# Patient Record
Sex: FEMALE | Race: WHITE | NOT HISPANIC OR LATINO | Employment: UNEMPLOYED | URBAN - METROPOLITAN AREA
[De-identification: names, ages, dates, MRNs, and addresses within clinical notes are randomized per-mention and may not be internally consistent; named-entity substitution may affect disease eponyms.]

---

## 2017-01-16 ENCOUNTER — OFFICE VISIT (OUTPATIENT)
Dept: FAMILY MEDICINE | Facility: CLINIC | Age: 62
End: 2017-01-16
Payer: COMMERCIAL

## 2017-01-16 VITALS
BODY MASS INDEX: 26.64 KG/M2 | SYSTOLIC BLOOD PRESSURE: 120 MMHG | HEART RATE: 96 BPM | TEMPERATURE: 97 F | OXYGEN SATURATION: 96 % | WEIGHT: 150.38 LBS | HEIGHT: 63 IN | DIASTOLIC BLOOD PRESSURE: 72 MMHG | RESPIRATION RATE: 18 BRPM

## 2017-01-16 DIAGNOSIS — N39.0 ACUTE UTI (URINARY TRACT INFECTION): Primary | ICD-10-CM

## 2017-01-16 DIAGNOSIS — R39.9 UTI SYMPTOMS: ICD-10-CM

## 2017-01-16 LAB
BILIRUB SERPL-MCNC: NEGATIVE MG/DL
BLOOD URINE, POC: 250
COLOR, POC UA: ABNORMAL
GLUCOSE UR QL STRIP: NORMAL
KETONES UR QL STRIP: NEGATIVE
LEUKOCYTE ESTERASE URINE, POC: 1
NITRITE, POC UA: ABNORMAL
PH, POC UA: 5
PROTEIN, POC: ABNORMAL
SPECIFIC GRAVITY, POC UA: 1.01
UROBILINOGEN, POC UA: NORMAL

## 2017-01-16 PROCEDURE — 99999 PR PBB SHADOW E&M-EST. PATIENT-LVL III: CPT | Mod: PBBFAC,,, | Performed by: REGISTERED NURSE

## 2017-01-16 PROCEDURE — 99213 OFFICE O/P EST LOW 20 MIN: CPT | Mod: 25,S$GLB,, | Performed by: REGISTERED NURSE

## 2017-01-16 PROCEDURE — 81002 URINALYSIS NONAUTO W/O SCOPE: CPT | Mod: S$GLB,,, | Performed by: REGISTERED NURSE

## 2017-01-16 PROCEDURE — 1159F MED LIST DOCD IN RCRD: CPT | Mod: S$GLB,,, | Performed by: REGISTERED NURSE

## 2017-01-16 RX ORDER — PHENAZOPYRIDINE HYDROCHLORIDE 200 MG/1
200 TABLET, FILM COATED ORAL
Qty: 6 TABLET | Refills: 0 | Status: SHIPPED | OUTPATIENT
Start: 2017-01-16 | End: 2017-01-18

## 2017-01-16 RX ORDER — NITROFURANTOIN 25; 75 MG/1; MG/1
100 CAPSULE ORAL 2 TIMES DAILY
Qty: 20 CAPSULE | Refills: 0 | Status: SHIPPED | OUTPATIENT
Start: 2017-01-16 | End: 2017-01-27

## 2017-01-16 NOTE — PROGRESS NOTES
"Subjective:       Patient ID: Yolis Bourgeois is a 61 y.o. female.    Chief Complaint: Cystitis    HPI     Mrs. Bourgeois is here today with UTI symptoms x 3 days.  Reports pain, burning, urgency and frequency.  Drinking water and cranberry juice, taking Azo.    Review of Systems   Constitutional: Negative for chills and fever.   Genitourinary: Positive for dysuria, frequency, pelvic pain and urgency. Negative for flank pain and hematuria.       Objective:         Vitals:    01/16/17 1431   BP: 120/72   Pulse: 96   Resp: 18   Temp: 97 °F (36.1 °C)   TempSrc: Tympanic   SpO2: 96%   Weight: 68.2 kg (150 lb 5.7 oz)   Height: 5' 2.5" (1.588 m)   PainSc:   8   PainLoc: Abdomen       Physical Exam   Constitutional: She is oriented to person, place, and time. She appears well-developed and well-nourished.   Abdominal: There is tenderness in the suprapubic area. There is no CVA tenderness.   Neurological: She is alert and oriented to person, place, and time.         Component      Latest Ref Rng & Units 1/16/2017   Color, UA       paula   Specific [place], UA       1.010   pH, UA       5   WBC, UA       1 (A)   Nitrite, UA       postive (A)   Protein, UA       trace (A)   Glucose, UA       normal   Ketones, UA       negative   Urobilinogen, UA       normal   Bilirubin, UA       negative   RBC, UA       250 (A)       Assessment:       1. Acute UTI (urinary tract infection)    2. UTI symptoms        Plan:       Yolis was seen today for cystitis.    Diagnoses and all orders for this visit:    Acute UTI (urinary tract infection)  -     nitrofurantoin, macrocrystal-monohydrate, (MACROBID) 100 MG capsule; Take 1 capsule (100 mg total) by mouth 2 (two) times daily.  -     phenazopyridine (PYRIDIUM) 200 MG tablet; Take 1 tablet (200 mg total) by mouth 3 (three) times daily with meals.    UTI symptoms  -     POCT urine dipstick without microscope  -     nitrofurantoin, macrocrystal-monohydrate, (MACROBID) 100 MG capsule; Take 1 capsule " (100 mg total) by mouth 2 (two) times daily.  -     phenazopyridine (PYRIDIUM) 200 MG tablet; Take 1 tablet (200 mg total) by mouth 3 (three) times daily with meals.      Infection triggers and prevention discussed.  RTC prn.

## 2017-01-26 ENCOUNTER — OFFICE VISIT (OUTPATIENT)
Dept: OPHTHALMOLOGY | Facility: CLINIC | Age: 62
End: 2017-01-26
Payer: COMMERCIAL

## 2017-01-26 DIAGNOSIS — H04.129 DRY EYE: Primary | ICD-10-CM

## 2017-01-26 DIAGNOSIS — H18.30 CORNEAL EPITHELIAL DEFECT: ICD-10-CM

## 2017-01-26 PROCEDURE — 92012 INTRM OPH EXAM EST PATIENT: CPT | Mod: S$GLB,,, | Performed by: OPHTHALMOLOGY

## 2017-01-27 ENCOUNTER — OFFICE VISIT (OUTPATIENT)
Dept: OPHTHALMOLOGY | Facility: CLINIC | Age: 62
End: 2017-01-27
Payer: COMMERCIAL

## 2017-01-27 DIAGNOSIS — H20.9 IRITIS: ICD-10-CM

## 2017-01-27 DIAGNOSIS — S05.02XD CORNEAL ABRASION, LEFT, SUBSEQUENT ENCOUNTER: Primary | ICD-10-CM

## 2017-01-27 PROBLEM — H18.30 CORNEAL EPITHELIAL DEFECT: Status: ACTIVE | Noted: 2017-01-27

## 2017-01-27 PROCEDURE — 99999 PR PBB SHADOW E&M-EST. PATIENT-LVL II: CPT | Mod: PBBFAC,,, | Performed by: OPHTHALMOLOGY

## 2017-01-27 PROCEDURE — 92012 INTRM OPH EXAM EST PATIENT: CPT | Mod: S$GLB,,, | Performed by: OPHTHALMOLOGY

## 2017-01-27 RX ORDER — METOPROLOL SUCCINATE 25 MG/1
12.5 TABLET, EXTENDED RELEASE ORAL DAILY
COMMUNITY
End: 2018-01-31

## 2017-01-27 RX ORDER — PREDNISOLONE SODIUM PHOSPHATE 10 MG/ML
1 SOLUTION/ DROPS OPHTHALMIC 3 TIMES DAILY
COMMUNITY
End: 2017-03-27 | Stop reason: ALTCHOICE

## 2017-01-27 RX ORDER — ERYTHROMYCIN 5 MG/G
OINTMENT OPHTHALMIC NIGHTLY
COMMUNITY
End: 2017-03-27 | Stop reason: ALTCHOICE

## 2017-01-27 NOTE — MR AVS SNAPSHOT
[facility]  [address] [place] [address]  Phone: [phone]  Fax: [phone]                  Yolis Bourgeois   2017 10:45 AM   Office Visit    Description:  Female : 1955   Provider:  Cr Fraga MD   Department:  [facility]           Reason for Visit     Follow-up           Diagnoses this Visit        Comments    Corneal abrasion, left, subsequent encounter    -  Primary     Iritis                To Do List           Future Appointments        Provider Department Dept Phone    3/17/2017 8:30 AM SHERRY Simons MD [facility] - Ophthalmology [phone]    2017 8:45 AM ONLH US1 [facility] [phone]      Goals (5 Years of Data)     None      [facility]     [facility] Nurse Care [facility] -  Assistance  Registered nurses in the [facility] provide clinical advisement, health education, appointment booking, and other advisory services.  Call for this free service at [phone].             Medications           Message regarding Medications     Verify the changes and/or additions to your medication regime listed below are the same as discussed with your clinician today.  If any of these changes or additions are incorrect, please notify your healthcare provider.        STOP taking these medications     nitrofurantoin, macrocrystal-monohydrate, (MACROBID) 100 MG capsule Take 1 capsule (100 mg total) by mouth 2 (two) times daily.           Verify that the below list of medications is an accurate representation of the medications you are currently taking.  If none reported, the list may be blank. If incorrect, please contact your healthcare provider. Carry this list with you in case of emergency.           Current Medications     cycloSPORINE (RESTASIS) 0.05 % ophthalmic emulsion 1 drop.    erythromycin (ROMYCIN) ophthalmic ointment Place into the left eye every evening.    lubiprostone (AMITIZA) 24 MCG Cap  Take 1 capsule (24 mcg total) by mouth 2 (two) times daily with meals.    metoprolol succinate (TOPROL-XL) 25 MG 24 hr tablet Take 25 mg by mouth once daily.    multivitamin with minerals (MULTI-VITAMIN W/MINERALS) Cap Take 2 capsules by mouth Daily.    prednisoLONE sodium phosphate (INFLAMASE FORTE) 1 % Drop Place 1 drop into the left eye 3 (three) times daily.    RESTASIS 0.05 % ophthalmic emulsion INSTILL ONE DROP IN EACH EYE TWICE DAILY    thyroid, pork, 81.25 mg Tab Take 81.25 mg by mouth once daily.    acrivastine-pseudoephedrine 8-60 mg Cap Take 1 tablet by mouth every 4 to 6 hours as needed.    aspirin (ECOTRIN) 81 MG EC tablet Take 81 mg by mouth once daily.    meloxicam (MOBIC) 15 MG tablet TAKE 1 TABLET BY MOUTH EVERY DAY *TAKE WITH FOOD* **MAY CAUSE DROWSINESS**    phentermine (ADIPEX-P) 37.5 mg tablet Take 37.5 mg by mouth every morning.           Clinical Reference Information           Allergies as of 1/27/2017     Climara [Estradiol]      Immunizations Administered on Date of Encounter - 1/27/2017     None

## 2017-01-27 NOTE — PROGRESS NOTES
SUBJECTIVE:   Yolis Bourgeois is a 61 y.o. female   Uncorrected distance visual acuity was 20/30 -2 in the right eye and 20/100 in the left eye.   Chief Complaint   Patient presents with    Follow-up     k abrasion os, iritis. 5 on pain scale today. symptoms are improving.         HPI:  HPI     Follow-up    Additional comments: k abrasion os, iritis. 5 on pain scale today.   symptoms are improving.            Comments   1 day k abrasion and iritis follow up os.     No dm  Anatomic variant mac degen  Cobblestone od  Dry eyes  Lasik  K abrasion os  Iritis os    Pred s phosphate tid os  Emycin lashonda q3hrs os       Last edited by Daisy Larsen MA on 1/27/2017 10:50 AM. (History)        Assessment /Plan :  1. Corneal abrasion, left, subsequent encounter continue Emycin ointment q 3 hours, add Refresh pm . Continues to improve but significant corneal haze and edema and iritis, this may be a defect from epithelial sloughing from severe dry eyes.   2. Iritis continue PRED as directed         RTC on Monday

## 2017-01-27 NOTE — PROGRESS NOTES
SUBJECTIVE:   Yolis Bourgeois is a 61 y.o. female   Visual acuity was not recorded.   No chief complaint on file.       HPI:      Assessment /Plan :  1. Dry eye    2. Corneal epithelial defect pt thinks she may have hit OS with restasis tip vs dry/sloughing. Emycin oint q3hrs and PSP 1 % tid     rtc in AM

## 2017-01-30 ENCOUNTER — OFFICE VISIT (OUTPATIENT)
Dept: OPHTHALMOLOGY | Facility: CLINIC | Age: 62
End: 2017-01-30
Payer: COMMERCIAL

## 2017-01-30 DIAGNOSIS — H04.129 DRY EYE: ICD-10-CM

## 2017-01-30 DIAGNOSIS — H18.30 CORNEAL EPITHELIAL DEFECT: Primary | ICD-10-CM

## 2017-01-30 PROCEDURE — 99999 PR PBB SHADOW E&M-EST. PATIENT-LVL II: CPT | Mod: PBBFAC,,, | Performed by: OPHTHALMOLOGY

## 2017-01-30 PROCEDURE — 92012 INTRM OPH EXAM EST PATIENT: CPT | Mod: S$GLB,,, | Performed by: OPHTHALMOLOGY

## 2017-01-30 NOTE — MR AVS SNAPSHOT
[facility]  [address] [facility] [place] [address]  Phone: [phone]  Fax: [phone]                  Yolis Bourgeois   2017 11:15 AM   Office Visit    Description:  Female : 1955   Provider:  Cr Fraga MD   Department:  [facility]           Reason for Visit     Corneal abrasion           Diagnoses this Visit        Comments    Corneal epithelial defect    -  Primary     Dry eye                To Do List           Future Appointments        Provider Department Dept Phone    3/17/2017 8:30 AM SHERRY Simons MD [facility] - Ophthalmology [phone]    2017 8:45 AM ONLH US1 [facility] [phone]      Goals (5 Years of Data)     None      [facility]     [facility] Nurse Care Line -  Assistance  Registered nurses in the [facility] provide clinical advisement, health education, appointment booking, and other advisory services.  Call for this free service at [phone].             Medications           Message regarding Medications     Verify the changes and/or additions to your medication regime listed below are the same as discussed with your clinician today.  If any of these changes or additions are incorrect, please notify your healthcare provider.             Verify that the below list of medications is an accurate representation of the medications you are currently taking.  If none reported, the list may be blank. If incorrect, please contact your healthcare provider. Carry this list with you in case of emergency.           Current Medications     acrivastine-pseudoephedrine 8-60 mg Cap Take 1 tablet by mouth every 4 to 6 hours as needed.    aspirin (ECOTRIN) 81 MG EC tablet Take 81 mg by mouth once daily.    erythromycin (ROMYCIN) ophthalmic ointment Place into the left eye every evening.    lubiprostone (AMITIZA) 24 MCG Cap Take 1 capsule (24 mcg total) by mouth 2 (two) times daily with meals.     meloxicam (MOBIC) 15 MG tablet TAKE 1 TABLET BY MOUTH EVERY DAY *TAKE WITH FOOD* **MAY CAUSE DROWSINESS**    metoprolol succinate (TOPROL-XL) 25 MG 24 hr tablet Take 25 mg by mouth once daily.    multivitamin with minerals (MULTI-VITAMIN W/MINERALS) Cap Take 2 capsules by mouth Daily.    phentermine (ADIPEX-P) 37.5 mg tablet Take 37.5 mg by mouth every morning.    prednisoLONE sodium phosphate (INFLAMASE FORTE) 1 % Drop Place 1 drop into the left eye 3 (three) times daily.    RESTASIS 0.05 % ophthalmic emulsion INSTILL ONE DROP IN EACH EYE TWICE DAILY    thyroid, pork, 81.25 mg Tab Take 81.25 mg by mouth once daily.           Clinical Reference Information           Allergies as of 1/30/2017     Climara [Estradiol]      Immunizations Administered on Date of Encounter - 1/30/2017     None

## 2017-01-30 NOTE — PROGRESS NOTES
SUBJECTIVE:   Yolis Bourgeois is a 61 y.o. female   Uncorrected distance visual acuity was 20/30 in the right eye and 20/50 in the left eye.   Chief Complaint   Patient presents with    Corneal abrasion     OS        HPI:  HPI     Corneal abrasion    Additional comments: OS           Comments   Pt states that her eye feels better. It isn't gone completely, but it is   feeling much better. [facility] a tad blurry. Never missed a dosage. Pt states   that she occasionally feels like something is being dragged across her   left eye. Pain is a 0.    No dm  Anatomic variant mac degen  Cobblestone od  Dry eyes  Lasik  K abrasion os  Iritis os  Punctal plugs ou    Pred s phosphate tid os  Emycin lashonda q3hrs os       Last edited by Buck Milan, Patient Care Assistant on 1/30/2017 11:23   AM. (History)        Assessment /Plan :  1. Corneal epithelial defect  Cont Pred S Phosphate TID OS Erythromycin PRN    2. Dry eye Cont restasis BID OU       Return to clinic Monday

## 2017-02-06 ENCOUNTER — OFFICE VISIT (OUTPATIENT)
Dept: OPHTHALMOLOGY | Facility: CLINIC | Age: 62
End: 2017-02-06
Payer: COMMERCIAL

## 2017-02-06 DIAGNOSIS — H18.30 CORNEAL EPITHELIAL DEFECT: Primary | ICD-10-CM

## 2017-02-06 PROCEDURE — 92012 INTRM OPH EXAM EST PATIENT: CPT | Mod: S$GLB,,, | Performed by: OPHTHALMOLOGY

## 2017-02-06 PROCEDURE — 99999 PR PBB SHADOW E&M-EST. PATIENT-LVL II: CPT | Mod: PBBFAC,,, | Performed by: OPHTHALMOLOGY

## 2017-02-06 NOTE — PROGRESS NOTES
SUBJECTIVE:   Yolis Bourgeois is a 61 y.o. female   Uncorrected distance visual acuity was 20/25 +1 in the right eye and 20/40 -1 in the left eye.   Chief Complaint   Patient presents with    Corneal Abrasion     1 week recheck K abrasion, Pred Phosphate TID OS, E-mycin prn OS, Naphcon A QAM        HPI:  HPI     Corneal Abrasion    Additional comments: 1 week recheck K abrasion, Pred Phosphate TID OS,   E-mycin prn OS, Naphcon A QAM           Comments   Pt states she's having some dryness today but otherwise the eyes have been   ok. She forgot to mention at her last visit she's also using Naphcon A in   the mornings both eyes and also Systane wipes both eyes. No ocular pain or   irritation.    No dm  Anatomic variant mac degen  Cobblestone od  Dry eyes  Lasik  K abrasion os  Iritis os  Punctal plugs ou    Pred s phosphate tid os  Emycin lashonda PRN  Restasis BID OU       Last edited by Brad Mendez on 2/6/2017 10:19 AM. (History)        Assessment /Plan :  1. Corneal epithelial defect   Healing well and lasik interface haze resolving- taper off PSP x 10 days and rtc prn

## 2017-02-06 NOTE — MR AVS SNAPSHOT
[facility]  [address] [facility] [place] [address]  Phone: [phone]  Fax: [phone]                  Yolis Bourgeois   2017 9:45 AM   Office Visit    Description:  Female : 1955   Provider:  Cr Fraga MD   Department:  [facility]           Reason for Visit     Corneal Abrasion           Diagnoses this Visit        Comments    Corneal epithelial defect    -  Primary            To Do List           Future Appointments        Provider Department Dept Phone    3/17/2017 8:30 AM [facility] MD [facility] [phone]    2017 8:45 AM ONLH US1 [facility] [phone]      Goals (5 Years of Data)     None      [facility]     [facility] Nurse Care Line -  Assistance  Registered nurses in the [facility] provide clinical advisement, health education, appointment booking, and other advisory services.  Call for this free service at [phone].             Medications           Message regarding Medications     Verify the changes and/or additions to your medication regime listed below are the same as discussed with your clinician today.  If any of these changes or additions are incorrect, please notify your healthcare provider.             Verify that the below list of medications is an accurate representation of the medications you are currently taking.  If none reported, the list may be blank. If incorrect, please contact your healthcare provider. Carry this list with you in case of emergency.           Current Medications     acrivastine-pseudoephedrine 8-60 mg Cap Take 1 tablet by mouth every 4 to 6 hours as needed.    aspirin (ECOTRIN) 81 MG EC tablet Take 81 mg by mouth once daily.    erythromycin (ROMYCIN) ophthalmic ointment Place into the left eye every evening.    lubiprostone (AMITIZA) 24 MCG Cap Take 1 capsule (24 mcg total) by mouth 2 (two) times daily with meals.    meloxicam (MOBIC) 15  MG tablet TAKE 1 TABLET BY MOUTH EVERY DAY *TAKE WITH FOOD* **MAY CAUSE DROWSINESS**    metoprolol succinate (TOPROL-XL) 25 MG 24 hr tablet Take 25 mg by mouth once daily.    multivitamin with minerals (MULTI-VITAMIN W/MINERALS) Cap Take 2 capsules by mouth Daily.    naphazoline 0.1% (NAPHCON) 0.1 % ophthalmic solution 1 drop 4 (four) times daily as needed.    phentermine (ADIPEX-P) 37.5 mg tablet Take 37.5 mg by mouth every morning.    prednisoLONE sodium phosphate (INFLAMASE FORTE) 1 % Drop Place 1 drop into the left eye 3 (three) times daily.    RESTASIS 0.05 % ophthalmic emulsion INSTILL ONE DROP IN EACH EYE TWICE DAILY    thyroid, pork, 81.25 mg Tab Take 81.25 mg by mouth once daily.           Clinical Reference Information           Allergies as of 2/6/2017     Climara [Estradiol]      Immunizations Administered on Date of Encounter - 2/6/2017     None      Language Assistance Services     ATTENTION: Language assistance services are available, free of charge. Please call [phone].      ATENCIÓN: Si mendozala ashvin, tiene a pedroza disposición servicios gratuitos de asistencia lingüística. Llame al [phone].     CHÚ Ý: N?u b?n nói Ti?ng Vi?t, có các d?ch v? h? tr? ngôn ng? mi?n phí dành cho b?n. G?i s? [phone].         [facility] complies with applicable Federal civil rights laws and does not discriminate on the basis of race, color, national origin, age, disability, or sex.

## 2017-03-17 ENCOUNTER — OFFICE VISIT (OUTPATIENT)
Dept: OPHTHALMOLOGY | Facility: CLINIC | Age: 62
End: 2017-03-17
Payer: COMMERCIAL

## 2017-03-17 DIAGNOSIS — H35.433 PAVING STONE RETINAL DEGENERATION OF BOTH EYES: Primary | ICD-10-CM

## 2017-03-17 PROCEDURE — 99999 PR PBB SHADOW E&M-EST. PATIENT-LVL II: CPT | Mod: PBBFAC,,, | Performed by: OPHTHALMOLOGY

## 2017-03-17 PROCEDURE — 92014 COMPRE OPH EXAM EST PT 1/>: CPT | Mod: S$GLB,,, | Performed by: OPHTHALMOLOGY

## 2017-03-17 RX ORDER — DIETHYLPROPION HYDROCHLORIDE 75 MG/1
1 TABLET, EXTENDED RELEASE ORAL EVERY MORNING
Refills: 0 | COMMUNITY
Start: 2017-02-20 | End: 2017-05-11

## 2017-03-17 NOTE — PROGRESS NOTES
===============================  Yolis Bourgeois,   61 y.o. female   Last visit [facility]: :3/15/2016   Last [place] [facility] dept. 2017  [facility]:  Uncorrected distance visual acuity was 20/30 in the right eye and 20/30 in the left eye.  Tonometry     Tonometry (Applanation, 8:28 AM)      Right Left   Pressure 18 18                  Not recorded         Not recorded        Chief Complaint   Patient presents with    PATTERN DYSTROPHY COBBLESTONE     YEARLY EXAM     Ophthalmic Medications     Ophthalmic - Anti-inflammatory, Glucocorticoids Start End    prednisoLONE sodium phosphate (INFLAMASE FORTE) 1 % Drop      Sig: Place 1 drop into the left eye 3 (three) times daily.    Class: Historical Med    Route: Left Eye    Ophthalmic - Decongestants Start End    naphazoline 0.1% (NAPHCON) 0.1 % ophthalmic solution      Si drop 4 (four) times daily as needed.    Class: Historical Med         HPI     PATTERN DYSTROPHY COBBLESTONE    Additional comments: YEARLY EXAM           Comments   Anatomic variant mac degen   Iritis os  Punctal plugs ou      Cobblestone od  Dry eyes  Lasik      Restasis BID OU  Naphcon A QAM OU  Systane Wipes       Last edited by SHERRY Simons MD on 3/17/2017  8:33 AM. (History)      Read Studies:   Vitals    ________________  3/17/2017  1. 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  Dry eyes  decompensatre d amonth ago w dry eyes  restasis  Mild spk os >od   c restiss    frq At s  Onitm,mnet hs  rtc 1 year  Inferior cobblestone  trila off napcon for 1 mo   yaritza ats     ===========================

## 2017-03-17 NOTE — MR AVS SNAPSHOT
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      [facility]     [facility] Nurse Care Line -  Assistance  Registered nurses in the [facility] provide clinical advisement, health education, appointment booking, and other advisory services.  Call for this free service at [phone].             Medications           Message regarding Medications     Verify the changes and/or additions to your medication regime listed below are the same as discussed with your clinician today.  If any of these changes or additions are incorrect, please notify your healthcare provider.             Verify that the below list of medications is an accurate representation of the medications you are currently taking.  If none reported, the list may be blank. If incorrect, please contact your healthcare provider. Carry this list with you in case of emergency.           Current Medications     acrivastine-pseudoephedrine 8-60 mg Cap Take 1 tablet by mouth every 4 to 6 hours as needed.    aspirin (ECOTRIN) 81 MG EC tablet Take 81 mg by mouth once daily.    diethylpropion 75 mg TbSR Take 1 tablet by mouth every morning.    erythromycin (ROMYCIN) ophthalmic ointment Place into the left eye every evening.    lubiprostone (AMITIZA) 24 MCG  Cap Take 1 capsule (24 mcg total) by mouth 2 (two) times daily with meals.    meloxicam (MOBIC) 15 MG tablet TAKE 1 TABLET BY MOUTH EVERY DAY *TAKE WITH FOOD* **MAY CAUSE DROWSINESS**    metoprolol succinate (TOPROL-XL) 25 MG 24 hr tablet Take 25 mg by mouth once daily.    multivitamin with minerals (MULTI-VITAMIN W/MINERALS) Cap Take 2 capsules by mouth Daily.    naphazoline 0.1% (NAPHCON) 0.1 % ophthalmic solution 1 drop 4 (four) times daily as needed.    phentermine (ADIPEX-P) 37.5 mg tablet Take 37.5 mg by mouth every morning.    prednisoLONE sodium phosphate (INFLAMASE FORTE) 1 % Drop Place 1 drop into the left eye 3 (three) times daily.    RESTASIS 0.05 % ophthalmic emulsion INSTILL ONE DROP IN EACH EYE TWICE DAILY    thyroid, pork, 81.25 mg Tab Take 81.25 mg by mouth once daily.           Clinical Reference Information           Allergies as of 3/17/2017     Climara [Estradiol]      Immunizations Administered on Date of Encounter - 3/17/2017     None      Language Assistance Services     ATTENTION: Language assistance services are available, free of charge. Please call [phone].      ATENCIÓN: Si mendozala ashvin, tiene a pedroza disposición servicios gratuitos de asistencia lingüística. Llame al [phone].     ZANDER Ý: N?u b?n nói Ti?ng Vi?t, có các d?ch v? h? tr? ngôn ng? mi?n phí dành cho b?n. G?i s? [phone].         [facility] complies with applicable Federal civil rights laws and does not discriminate on the basis of race, color, national origin, age, disability, or sex.

## 2017-03-27 ENCOUNTER — OFFICE VISIT (OUTPATIENT)
Dept: FAMILY MEDICINE | Facility: CLINIC | Age: 62
End: 2017-03-27
Payer: COMMERCIAL

## 2017-03-27 ENCOUNTER — HOSPITAL ENCOUNTER (OUTPATIENT)
Dept: RADIOLOGY | Facility: HOSPITAL | Age: 62
Discharge: HOME | End: 2017-03-27
Attending: REGISTERED NURSE
Payer: COMMERCIAL

## 2017-03-27 VITALS
DIASTOLIC BLOOD PRESSURE: 82 MMHG | OXYGEN SATURATION: 97 % | HEART RATE: 81 BPM | BODY MASS INDEX: 24.75 KG/M2 | TEMPERATURE: 96 F | WEIGHT: 148.56 LBS | SYSTOLIC BLOOD PRESSURE: 132 MMHG | HEIGHT: 65 IN | RESPIRATION RATE: 18 BRPM

## 2017-03-27 DIAGNOSIS — M79.18 LEFT BUTTOCK PAIN: Primary | ICD-10-CM

## 2017-03-27 DIAGNOSIS — M79.18 LEFT BUTTOCK PAIN: ICD-10-CM

## 2017-03-27 PROCEDURE — 72170 X-RAY EXAM OF PELVIS: CPT | Mod: TC,PO

## 2017-03-27 PROCEDURE — 1160F RVW MEDS BY RX/DR IN RCRD: CPT | Mod: S$GLB,,, | Performed by: REGISTERED NURSE

## 2017-03-27 PROCEDURE — 99213 OFFICE O/P EST LOW 20 MIN: CPT | Mod: S$GLB,,, | Performed by: REGISTERED NURSE

## 2017-03-27 PROCEDURE — 72170 X-RAY EXAM OF PELVIS: CPT | Mod: 26,,, | Performed by: RADIOLOGY

## 2017-03-27 PROCEDURE — 99999 PR PBB SHADOW E&M-EST. PATIENT-LVL IV: CPT | Mod: PBBFAC,,, | Performed by: REGISTERED NURSE

## 2017-03-27 RX ORDER — MELOXICAM 15 MG/1
TABLET ORAL
Qty: 30 TABLET | Refills: 2 | Status: SHIPPED | OUTPATIENT
Start: 2017-03-27 | End: 2018-04-09 | Stop reason: SDUPTHER

## 2017-03-27 RX ORDER — METOPROLOL SUCCINATE 25 MG/1
25 TABLET, EXTENDED RELEASE ORAL DAILY
Status: CANCELLED | OUTPATIENT
Start: 2017-03-27

## 2017-03-27 NOTE — TELEPHONE ENCOUNTER
Spoke with patient states she would like to get refill on meloxicam, because she was taking it for her shoulder and it worked and she don't have to take all the time

## 2017-03-27 NOTE — MR AVS SNAPSHOT
[facility]  [address]  [place] [address]  Phone: [phone]                  Yolis Bourgeois   3/27/2017 9:00 AM   Office Visit    Description:  Female : 1955   Provider:  James Tejada NP   Department:  [facility]           Reason for Visit     Hip Pain           Diagnoses this Visit        Comments    Left buttock pain    -  Primary            To Do List           Future Appointments        Provider Department Dept Phone    3/27/2017 10:00 AM [facility] XR1 [facility] [phone]    2017 8:45 AM ONL US1 [facility] [phone]      Goals (5 Years of Data)     None      [facility]     [facility] -  Assistance  Registered nurses in the [facility] provide clinical advisement, health education, appointment booking, and other advisory services.  Call for this free service at [phone].             Medications           Message regarding Medications     Verify the changes and/or additions to your medication regime listed below are the same as discussed with your clinician today.  If any of these changes or additions are incorrect, please notify your healthcare provider.        STOP taking these medications     erythromycin (ROMYCIN) ophthalmic ointment Place into the left eye every evening.    naphazoline 0.1% (NAPHCON) 0.1 % ophthalmic solution 1 drop 4 (four) times daily as needed.    prednisoLONE sodium phosphate (INFLAMASE FORTE) 1 % Drop Place 1 drop into the left eye 3 (three) times daily.           Verify that the below list of medications is an accurate representation of the medications you are currently taking.  If none reported, the list may be blank. If incorrect, please contact your healthcare provider. Carry this list with you in case of emergency.           Current Medications     aspirin (ECOTRIN) 81 MG EC tablet Take 81 mg by mouth once  "daily.    diethylpropion 75 mg TbSR Take 1 tablet by mouth every morning.    lubiprostone (AMITIZA) 24 MCG Cap Take 1 capsule (24 mcg total) by mouth 2 (two) times daily with meals.    metoprolol succinate (TOPROL-XL) 25 MG 24 hr tablet Take 25 mg by mouth once daily.    multivitamin with minerals (MULTI-VITAMIN W/MINERALS) Cap Take 2 capsules by mouth Daily.    RESTASIS 0.05 % ophthalmic emulsion INSTILL ONE DROP IN EACH EYE TWICE DAILY    thyroid, pork, 81.25 mg Tab Take 81.25 mg by mouth once daily.    acrivastine-pseudoephedrine 8-60 mg Cap Take 1 tablet by mouth every 4 to 6 hours as needed.    meloxicam (MOBIC) 15 MG tablet TAKE 1 TABLET BY MOUTH EVERY DAY *TAKE WITH FOOD* **MAY CAUSE DROWSINESS**    phentermine (ADIPEX-P) 37.5 mg tablet Take 37.5 mg by mouth every morning.           Clinical Reference Information           Your Vitals Were     BP Pulse Temp Resp Height Weight    132/82 81 96 °F (35.6 °C) (Tympanic) 18 5' 4.5" (1.638 m) 67.4 kg (148 lb 9.4 oz)    SpO2 BMI             97% 25.11 kg/m2         Blood Pressure          Most Recent Value    BP  132/82      Allergies as of 3/27/2017     Climara [Estradiol]      Immunizations Administered on Date of Encounter - 3/27/2017     None      Orders Placed During Today's Visit     Future Labs/Procedures Expected by Expires    X-Ray Pelvis Routine AP  3/27/2017 3/27/2018      Language Assistance Services     ATTENTION: Language assistance services are available, free of charge. Please call [phone].      ATENCIÓN: Si delfino lock, tiene a pedroza disposición servicios gratuitos de asistencia lingüística. Llame al [phone].     ZANDER Ý: N?u b?n nói Ti?ng Vi?t, có các d?ch v? h? tr? ngôn ng? mi?n phí dành cho b?n. G?i s? [phone].         [facility] complies with applicable Federal civil rights laws and does not discriminate on the basis of race, color, national origin, age, disability, or sex.        "

## 2017-03-27 NOTE — PROGRESS NOTES
"Subjective:       Patient ID: Yolis Bourgeois is a 61 y.o. female.    Chief Complaint: Hip Pain    HPI     Mrs. Bourgeois is here today with c/o LT hip pain.  Denies injury or trauma.  Pain ~ 2 to 3 weeks, waxing and waning.  Increases w/ moving in a certain way, catches her at times.  She is able to lay on LT side, does have full ROM in hip joint.  Denies hip stiffness, stretching helps.  Denies numbness or weakness.  Able to cross legs w/out problems.  Taking Aleve or ibuprofen for pain.    Review of Systems   Constitutional: Negative.    Respiratory: Negative.    Cardiovascular: Negative.    Musculoskeletal: Positive for arthralgias. Negative for gait problem and joint swelling.   Neurological: Negative.        Objective:       Vitals:    03/27/17 0858   BP: 132/82   Pulse: 81   Resp: 18   Temp: 96 °F (35.6 °C)   TempSrc: Tympanic   SpO2: 97%   Weight: 67.4 kg (148 lb 9.4 oz)   Height: 5' 4.5" (1.638 m)   PainSc: 0-No pain       Physical Exam   Constitutional: She is oriented to person, place, and time. She appears well-developed and well-nourished. No distress.   Musculoskeletal:        Lumbar back: She exhibits tenderness. She exhibits no swelling, no edema, no deformity and normal pulse.        Back:    Neurological: She is alert and oriented to person, place, and time. She displays no atrophy. No sensory deficit. She exhibits normal muscle tone. Coordination and gait normal.   Skin: She is not diaphoretic.       Assessment:       1. Left buttock pain        Plan:       Yolis was seen today for hip pain.    Diagnoses and all orders for this visit:    Left buttock pain  -     X-Ray Pelvis Routine AP; Future      Ice/heat, rest, exercises.  To PT if needed.  RTC prn.  "

## 2017-03-27 NOTE — TELEPHONE ENCOUNTER
Xrays show mild degenerative findings/arthritis present at the pelvis and bilateral SI joints.  Also noted to have some scoliosis of the lower lumbar spine.  Ice/heat, rest, Advil/Tylenol.  To PT if pain worsens.

## 2017-03-27 NOTE — TELEPHONE ENCOUNTER
Patient aware of result and express understanding. Patient would like to get a refill on moloxicam. Patient states it helped

## 2017-04-26 ENCOUNTER — HOSPITAL ENCOUNTER (OUTPATIENT)
Dept: RADIOLOGY | Facility: HOSPITAL | Age: 62
Discharge: HOME | End: 2017-04-26
Attending: NURSE PRACTITIONER
Payer: COMMERCIAL

## 2017-04-26 DIAGNOSIS — N28.1 RENAL CYST: ICD-10-CM

## 2017-04-26 PROCEDURE — 76770 US EXAM ABDO BACK WALL COMP: CPT | Mod: TC

## 2017-04-26 PROCEDURE — 76770 US EXAM ABDO BACK WALL COMP: CPT | Mod: 26,,, | Performed by: RADIOLOGY

## 2017-05-11 ENCOUNTER — OFFICE VISIT (OUTPATIENT)
Dept: UROLOGY | Facility: CLINIC | Age: 62
End: 2017-05-11
Payer: COMMERCIAL

## 2017-05-11 VITALS
BODY MASS INDEX: 24.62 KG/M2 | SYSTOLIC BLOOD PRESSURE: 122 MMHG | WEIGHT: 147.94 LBS | DIASTOLIC BLOOD PRESSURE: 74 MMHG

## 2017-05-11 DIAGNOSIS — N28.1 RENAL CYST: Primary | ICD-10-CM

## 2017-05-11 PROCEDURE — 99214 OFFICE O/P EST MOD 30 MIN: CPT | Mod: S$GLB,,, | Performed by: UROLOGY

## 2017-05-11 PROCEDURE — 1160F RVW MEDS BY RX/DR IN RCRD: CPT | Mod: S$GLB,,, | Performed by: UROLOGY

## 2017-05-11 PROCEDURE — 99999 PR PBB SHADOW E&M-EST. PATIENT-LVL II: CPT | Mod: PBBFAC,,, | Performed by: UROLOGY

## 2017-05-11 NOTE — MR AVS SNAPSHOT
[facility]  [address] [address]  Phone: [phone]  Fax: [phone]                  Yolis Bourgeois   2017 2:40 PM   Office Visit    Description:  Female : 1955   Provider:  James Villagomez IV, MD   Department:  [facility]           Diagnoses this Visit        Comments    Renal cyst    -  Primary            To Do List           Future Appointments        Provider Department Dept Phone    2018 8:00 AM ONLH US1 [facility] [phone]    2018 1:40 PM James Villagomez IV, MD [facility] [phone]      Goals (5 Years of Data)     None      Follow-Up and Disposition     Return in about 1 year (around 2018).    Follow-up and Disposition History      [facility]     [facility] Nurse Care [facility] -  Assistance  Unless otherwise directed by your provider, please contact [facility], our nurse care line that is available for  assistance.     Registered nurses in the [facility] provide: appointment scheduling, clinical advisement, health education, and other advisory services.  Call: [phone] (toll free)               Medications           Message regarding Medications     Verify the changes and/or additions to your medication regime listed below are the same as discussed with your clinician today.  If any of these changes or additions are incorrect, please notify your healthcare provider.        STOP taking these medications     diethylpropion 75 mg TbSR Take 1 tablet by mouth every morning.    phentermine (ADIPEX-P) 37.5 mg tablet Take 37.5 mg by mouth every morning.           Verify that the below list of medications is an accurate representation of the medications you are currently taking.  If none reported, the list may be blank. If incorrect, please contact your healthcare provider. Carry this list with you in case of emergency.           Current Medications      acrivastine-pseudoephedrine 8-60 mg Cap Take 1 tablet by mouth every 4 to 6 hours as needed.    aspirin (ECOTRIN) 81 MG EC tablet Take 81 mg by mouth once daily.    lubiprostone (AMITIZA) 24 MCG Cap Take 1 capsule (24 mcg total) by mouth 2 (two) times daily with meals.    meloxicam (MOBIC) 15 MG tablet TAKE 1 TABLET BY MOUTH EVERY DAY *TAKE WITH FOOD* **MAY CAUSE DROWSINESS**    metoprolol succinate (TOPROL-XL) 25 MG 24 hr tablet Take 12.5 mg by mouth once daily.     multivitamin with minerals (MULTI-VITAMIN W/MINERALS) Cap Take 2 capsules by mouth Daily.    RESTASIS 0.05 % ophthalmic emulsion INSTILL ONE DROP IN EACH EYE TWICE DAILY    thyroid, pork, 81.25 mg Tab Take 81.25 mg by mouth once daily.           Clinical Reference Information           Your Vitals Were     BP Weight BMI          122/74 67.1 kg (147 lb 14.9 oz) 24.62 kg/m2        Blood Pressure          Most Recent Value    BP  122/74      Allergies as of 5/11/2017     Climara [Estradiol]      Immunizations Administered on Date of Encounter - 5/11/2017     None      Orders Placed During Today's Visit     Future Labs/Procedures Expected by Expires    US Retroperitoneal Complete (Kidney and  5/11/2017 5/11/2018      Language Assistance Services     ATTENTION: Language assistance services are available, free of charge. Please call [phone].      ATENCIÓN: Si habla ashvin, tiene a pedroza disposición servicios gratuitos de asistencia lingüística. Llame al [phone].     CHÚ Ý: N?u b?n nói Ti?ng Vi?t, có các d?ch v? h? tr? ngôn ng? mi?n phí dành cho b?n. G?i s? [phone].         [facility] complies with applicable Federal civil rights laws and does not discriminate on the basis of race, color, national origin, age, disability, or sex.

## 2017-05-11 NOTE — PROGRESS NOTES
Chief Complaint: Renal cysts    HPI:   5/11/17: 60 yo woman followed for 4 years for renal cysts.  Had a pyelonephritis on the way.  Has a 2.5 cm right Adal II cyst that looks stable over many years.    Allergies:  Climara [estradiol]    Medications: has a current medication list which includes the following prescription(s): acrivastine-pseudoephedrine, aspirin, lubiprostone, meloxicam, metoprolol succinate, multivitamin with minerals, restasis, and thyroid (pork).    Review of Systems:  General: No fever, chills, fatigability, or weight loss.  Skin: No rashes, itching, or changes in color or texture of skin.  Chest: Denies NIEVES, cyanosis, wheezing, cough, and sputum production.  Abdomen: Appetite fine. No weight loss. Denies diarrhea, abdominal pain, hematemesis, or blood in stool.  Musculoskeletal: No joint stiffness or swelling. Denies back pain.  : As above.  All other review of systems negative.    PMH:   has a past medical history of Chronic constipation; Depression; Dry eyes; Endometriosis of broad ligament; Hypothyroidism; Macular degeneration; Migraine headache; Osteoarthritis of hand; and Seasonal allergic rhinitis.    PSH:   has a past surgical history that includes Lipoma resection (2010); Total abdominal hysterectomy w/ bilateral salpingoophorectomy (March 2008); Cervical conization w/ laser; Dilation and curettage of uterus; and LASIK (2007).    FamHx: family history includes Dementia in her mother; Diabetes in her father; Glaucoma in her maternal uncle; Heart disease in her father and mother; Heart failure in her father; Hypertension in her father and mother. There is no history of Amblyopia, Blindness, Cancer, Cataracts, Macular degeneration, Retinal detachment, Strabismus, Stroke, or Thyroid disease.    SocHx:  reports that she quit smoking about 34 years ago. She has never used smokeless tobacco. She reports that she drinks alcohol. She reports that she does not use illicit drugs.     Physical  Exam:  Vitals:   Vitals:    05/11/17 1458   BP: 122/74     General: A&Ox3. No apparent distress. No deformities.  Neck: No masses. Normal thyroid.  Lungs: normal inspiration. No use of accessory muscles.  Heart: normal pulse. No arrhythmias.  Abdomen: Soft. NT. ND  Skin: The skin is warm and dry. No jaundice.  Ext: No c/c/e.  : deferred    Labs/Studies:     Impression/Plan:   1. Unlikely this renal cyst will ever be a problem.  But after discussion agreed to the reassurance of annual US.

## 2017-05-19 ENCOUNTER — HOSPITAL ENCOUNTER (OUTPATIENT)
Dept: RADIOLOGY | Facility: HOSPITAL | Age: 62
Discharge: HOME | End: 2017-05-19
Attending: FAMILY MEDICINE
Payer: COMMERCIAL

## 2017-05-19 ENCOUNTER — OFFICE VISIT (OUTPATIENT)
Dept: FAMILY MEDICINE | Facility: CLINIC | Age: 62
End: 2017-05-19
Payer: COMMERCIAL

## 2017-05-19 VITALS
HEIGHT: 64 IN | WEIGHT: 147.69 LBS | SYSTOLIC BLOOD PRESSURE: 130 MMHG | DIASTOLIC BLOOD PRESSURE: 80 MMHG | BODY MASS INDEX: 25.21 KG/M2 | OXYGEN SATURATION: 98 % | HEART RATE: 70 BPM | RESPIRATION RATE: 16 BRPM | TEMPERATURE: 97 F

## 2017-05-19 DIAGNOSIS — M25.511 CHRONIC RIGHT SHOULDER PAIN: Primary | ICD-10-CM

## 2017-05-19 DIAGNOSIS — G89.29 CHRONIC RIGHT SHOULDER PAIN: Primary | ICD-10-CM

## 2017-05-19 DIAGNOSIS — M25.511 CHRONIC RIGHT SHOULDER PAIN: ICD-10-CM

## 2017-05-19 DIAGNOSIS — G89.29 CHRONIC RIGHT SHOULDER PAIN: ICD-10-CM

## 2017-05-19 DIAGNOSIS — M75.51 BURSITIS OF RIGHT SHOULDER: ICD-10-CM

## 2017-05-19 PROCEDURE — 99999 PR PBB SHADOW E&M-EST. PATIENT-LVL IV: CPT | Mod: PBBFAC,,, | Performed by: FAMILY MEDICINE

## 2017-05-19 PROCEDURE — 73030 X-RAY EXAM OF SHOULDER: CPT | Mod: 26,RT,, | Performed by: RADIOLOGY

## 2017-05-19 PROCEDURE — 1160F RVW MEDS BY RX/DR IN RCRD: CPT | Mod: S$GLB,,, | Performed by: FAMILY MEDICINE

## 2017-05-19 PROCEDURE — 73030 X-RAY EXAM OF SHOULDER: CPT | Mod: TC,PO,RT

## 2017-05-19 PROCEDURE — 99214 OFFICE O/P EST MOD 30 MIN: CPT | Mod: S$GLB,,, | Performed by: FAMILY MEDICINE

## 2017-05-19 NOTE — PROGRESS NOTES
Subjective:       Patient ID: Yolis Bourgeois is a 61 y.o. female.    Chief Complaint: Shoulder Pain      HPI   Ms. Bourgeois presents to clinic today for complaints of right shoulder pain.   She states she has bursitis and has had this before.   She states it hurts to lay on her right side.   She states the pain is worst with driving.   She has been taking mobic and it is not helping.   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  The pain is worst with certain movements.       Review of Systems   Constitutional: Negative for fever.   Respiratory: Negative for shortness of breath.    Cardiovascular: Negative for chest pain.   Gastrointestinal: Negative for abdominal pain, diarrhea, nausea and vomiting.   Genitourinary: Negative for dysuria and hematuria.   Skin: Negative for rash.       Medication List with Changes/Refills   Current Medications    ACRIVASTINE-PSEUDOEPHEDRINE 8-60 MG CAP    Take 1 tablet by mouth every 4 to 6 hours as needed.    ASPIRIN (ECOTRIN) 81 MG EC TABLET    Take 81 mg by mouth once daily.    LUBIPROSTONE (AMITIZA) 24 MCG CAP    Take 1 capsule (24 mcg total) by mouth 2 (two) times daily with meals.    MELOXICAM (MOBIC) 15 MG TABLET    TAKE 1 TABLET BY MOUTH EVERY DAY *TAKE WITH FOOD* **MAY CAUSE DROWSINESS**    METOPROLOL SUCCINATE (TOPROL-XL) 25 MG 24 HR TABLET    Take 12.5 mg by mouth once daily.     MULTIVITAMIN WITH MINERALS (MULTI-VITAMIN W/MINERALS) CAP    Take 2 capsules by mouth Daily.    RESTASIS 0.05 % OPHTHALMIC EMULSION    INSTILL ONE DROP IN EACH EYE TWICE DAILY    THYROID, PORK, 81.25 MG TAB    Take 81.25 mg by mouth once daily.       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 She appears well-developed and well-nourished. No distress.   HENT:   Head: Normocephalic and atraumatic.   Eyes: EOM are normal. Right eye exhibits no discharge. Left eye exhibits no discharge.   Cardiovascular: Normal rate and regular rhythm.    Pulmonary/Chest: Effort normal and breath sounds normal. No respiratory distress. She has no wheezes.   Abdominal: There is no tenderness.   Musculoskeletal: She exhibits no edema.   Right shoulder with no erythema , warmth, tenderness noted   Negative scarf test   One area of mild tenderness on posterior shoulder  Empty can negative  Sensation is intact      Neurological: She is alert and oriented to person, place, and time.   Skin: Skin is warm and dry. She is not diaphoretic. No erythema.   Psychiatric: She has a normal mood and affect.   Vitals reviewed.    Vitals:    05/19/17 0841   BP: 130/80   Pulse: 70   Resp: 16   Temp: 96.5 °F (35.8 °C)       Brief Procedure note   Discussed potential complications of procedure including bleeding, infection, and injury to nerve.  Patient understands and accepts risks.  Verbal consent obtained.     Procedure: After sterile prep, 1 cc Kenalog 40 mg/cc +4 cc 1% Xylocaine injected using posterior approach   Patient tolerated procedure well with null blood loss     Assessment/  PLAN     Chronic right shoulder pain  -     X-ray Shoulder 2 or More Views Right; Future; Expected date: 05/19/2017  -     lidocaine HCL 20 mg/ml (2%) injection 4 mL; 4 mLs by Other route one time.  -     triamcinolone acetonide injection 40 mg; Inject 1 mL (40 mg total) into the articular space one time.    Bursitis of right shoulder  -     lidocaine HCL 20 mg/ml (2%) injection 4 mL; 4 mLs by Other route one time.  -     triamcinolone acetonide injection 40 mg; Inject 1 mL (40 mg total) into the articular space one time.  - ice shoulder  , decrease sewing if possible   - return precautions advised     plan as above   rtc prn         Shiv Vo MD  [facility]

## 2017-05-19 NOTE — MR AVS SNAPSHOT
[facility]  [address]  [place] [address]  Phone: [phone]                  Yolis Bourgeois   2017 8:40 AM   Office Visit    Description:  Female : 1955   Provider:  Shiv Vo MD   Department:  [facility]           Reason for Visit     Shoulder Pain           Diagnoses this Visit        Comments    Chronic right shoulder pain    -  Primary            To Do List           Future Appointments        Provider Department Dept Phone    2017  9:15 AM [facility] XR1 [facility] [phone]    2018 8:00 AM ONL US1 [facility] [phone]    2018 1:40 PM James Villagomez IV, MD [facility] [phone]      Goals (5 Years of Data)     None      [facility] On Call     [facility] Nurse Care Line -  Assistance  Unless otherwise directed by your provider, please contact [facility], our nurse care line that is available for  assistance.     Registered nurses in the [facility] provide: appointment scheduling, clinical advisement, health education, and other advisory services.  Call: [phone] (toll free)               Medications           Message regarding Medications     Verify the changes and/or additions to your medication regime listed below are the same as discussed with your clinician today.  If any of these changes or additions are incorrect, please notify your healthcare provider.             Verify that the below list of medications is an accurate representation of the medications you are currently taking.  If none reported, the list may be blank. If incorrect, please contact your healthcare provider. Carry this list with you in case of emergency.           Current Medications     acrivastine-pseudoephedrine 8-60 mg Cap Take 1 tablet by mouth every 4 to 6 hours as needed.    aspirin (ECOTRIN) 81 MG EC tablet Take 81 mg by mouth once daily.     "lubiprostone (AMITIZA) 24 MCG Cap Take 1 capsule (24 mcg total) by mouth 2 (two) times daily with meals.    meloxicam (MOBIC) 15 MG tablet TAKE 1 TABLET BY MOUTH EVERY DAY *TAKE WITH FOOD* **MAY CAUSE DROWSINESS**    metoprolol succinate (TOPROL-XL) 25 MG 24 hr tablet Take 12.5 mg by mouth once daily.     multivitamin with minerals (MULTI-VITAMIN W/MINERALS) Cap Take 2 capsules by mouth Daily.    RESTASIS 0.05 % ophthalmic emulsion INSTILL ONE DROP IN EACH EYE TWICE DAILY    thyroid, pork, 81.25 mg Tab Take 81.25 mg by mouth once daily.           Clinical Reference Information           Your Vitals Were     BP Pulse Temp Resp Height Weight    130/80 70 96.5 °F (35.8 °C) 16 5' 4" (1.626 m) 67 kg (147 lb 11.3 oz)    SpO2 BMI             98% 25.35 kg/m2         Blood Pressure          Most Recent Value    BP  130/80      Allergies as of 5/19/2017     Climara [Estradiol]      Immunizations Administered on Date of Encounter - 5/19/2017     None      Orders Placed During Today's Visit     Future Labs/Procedures Expected by Expires    X-ray Shoulder 2 or More Views Right  5/19/2017 5/19/2018      Language Assistance Services     ATTENTION: Language assistance services are available, free of charge. Please call [phone].      ATENCIÓN: Si mendozala ashvin, tiene a pedroza disposición servicios gratuitos de asistencia lingüística. Llame al [phone].     CHÚ Ý: N?u b?n nói Ti?ng Vi?t, có các d?ch v? h? tr? ngôn ng? mi?n phí dành cho b?n. G?i s? [phone].         [facility] complies with applicable Federal civil rights laws and does not discriminate on the basis of race, color, national origin, age, disability, or sex.        "

## 2017-05-22 RX ORDER — TRIAMCINOLONE ACETONIDE 40 MG/ML
40 INJECTION, SUSPENSION INTRA-ARTICULAR; INTRAMUSCULAR
Status: DISCONTINUED | OUTPATIENT
Start: 2017-05-22 | End: 2017-06-08

## 2017-05-22 RX ORDER — LIDOCAINE HYDROCHLORIDE 20 MG/ML
4 INJECTION, SOLUTION INFILTRATION; PERINEURAL
Status: DISCONTINUED | OUTPATIENT
Start: 2017-05-22 | End: 2017-06-08

## 2017-06-01 ENCOUNTER — PATIENT MESSAGE (OUTPATIENT)
Dept: FAMILY MEDICINE | Facility: CLINIC | Age: 62
End: 2017-06-01

## 2017-06-02 ENCOUNTER — OFFICE VISIT (OUTPATIENT)
Dept: FAMILY MEDICINE | Facility: CLINIC | Age: 62
End: 2017-06-02
Payer: COMMERCIAL

## 2017-06-02 VITALS
HEART RATE: 71 BPM | HEIGHT: 64 IN | OXYGEN SATURATION: 97 % | SYSTOLIC BLOOD PRESSURE: 114 MMHG | DIASTOLIC BLOOD PRESSURE: 70 MMHG | TEMPERATURE: 97 F | RESPIRATION RATE: 18 BRPM | WEIGHT: 145.94 LBS | BODY MASS INDEX: 24.92 KG/M2

## 2017-06-02 DIAGNOSIS — G43.909 MIGRAINE WITHOUT STATUS MIGRAINOSUS, NOT INTRACTABLE, UNSPECIFIED MIGRAINE TYPE: Primary | ICD-10-CM

## 2017-06-02 DIAGNOSIS — F43.9 SITUATIONAL STRESS: ICD-10-CM

## 2017-06-02 DIAGNOSIS — M54.2 NECK PAIN: ICD-10-CM

## 2017-06-02 PROCEDURE — 99214 OFFICE O/P EST MOD 30 MIN: CPT | Mod: S$GLB,,, | Performed by: FAMILY MEDICINE

## 2017-06-02 PROCEDURE — 99999 PR PBB SHADOW E&M-EST. PATIENT-LVL III: CPT | Mod: PBBFAC,,, | Performed by: FAMILY MEDICINE

## 2017-06-02 RX ORDER — BUTALBITAL, ACETAMINOPHEN AND CAFFEINE 50; 325; 40 MG/1; MG/1; MG/1
1-2 TABLET ORAL EVERY 6 HOURS PRN
Qty: 30 TABLET | Refills: 0 | Status: SHIPPED | OUTPATIENT
Start: 2017-06-02 | End: 2017-07-02

## 2017-06-02 NOTE — PROGRESS NOTES
CHIEF COMPLAINT: This is a 61-year-old female complaining of migraine headache.    SUBJECTIVE: The patient complains of recurrent migraine headaches for the last 2 weeks.  Patient has had migraine headaches in the past but headaches have all but resolved in the last several years.  Headaches are located in various locations on her head including temple area, occiput and face.  She describes the pain as dull and throbbing.  And rates the pain at 10 out of 10 at its worst.  Patient has occasional nausea associated with the pain and complains of hyperacusis.  She has slight photophobia.  She has tried taking Tylenol, ibuprofen and Excedrin migraine without relief.  Years ago, she took Midrin or Fioricet for headaches.  She was never on abortive or prophylactic medication.  Patient admits to recent increased situational stress: her mother has dementia, her father has severe heart disease at age 87, her  is dealing with unrelenting neuropathy, her daughter is having problems with her ex- and her son's wife just admitted to an affair.  Patient denies vertigo, numbness, tingling, weakness in limb, dysarthria, dysphagia or abnormality of gait.    ROS:  GENERAL: Patient denies fever, chills, night sweats.  Patient denies weight loss. Patient denies anorexia, fatigue, weakness or swollen glands.  SKIN: Patient denies rash or hair loss.  HEENT: Patient denies sore throat, ear pain, hearing loss, nasal congestion, or runny nose. Patient denies visual disturbance, eye irritation or discharge.  LUNGS: Patient denies cough, wheeze or hemoptysis.  CARDIOVASCULAR: Patient denies chest pain, shortness of breath, palpitations, syncope or lower extremity edema.  GI: Patient denies abdominal pain, nausea, vomiting, diarrhea, constipation, blood in stool or melena.  GENITOURINARY: Patient denies pelvic pain, vaginal discharge, itch or odor. Patient denies irregular vaginal bleeding.  Patient denies dysuria, frequency,  hematuria, nocturia, urgency or incontinence.  BREASTS: Patient denies breast pain, mass or nipple discharge.  MUSCULOSKELETAL: Patient denies joint swelling, redness or warmth.  NEUROLOGIC: As above.  PSYCHIATRIC: Patient depression, insomnia or memory loss.  Positive for anxiety.     OBJECTIVE:   GENERAL: Well-developed well-nourished overweight white female alert and oriented x3, in no acute distress.  Memory, judgment and cognition without deficit.   SKIN: Clear without rash.  Normal color and tone.  HEENT: Eyes: Clear conjunctivae. No scleral icterus.  Pupils equal reactive to light and accommodation..  Extraocular movements intact.  No nystagmus.  Fundi not visualized Ears: Clear canals. Clear TMs.  Nose: Without congestion.  Pharynx: Without injection or exudates.  NECK: Supple, normal range of motion.  Slight tenderness to palpation at base of occiput extending into posterior neck.  No masses, lymphadenopathy or enlarged thyroid.  No JVD.  Carotids 2+ and equal.  No bruits.  LUNGS: Clear to auscultation.  Normal respiratory effort.  CARDIOVASCULAR:  Regular rhythm, normal S1, S2 without murmur, gallop or rub.  ABDOMEN:  Soft, nontender without mass or organomegaly.  No rebound or guarding.  EXTREMITIES: Without cyanosis, clubbing or edema.  Distal pulses 2+ and equal.  Normal range of motion in all extremities.  No joint effusion, erythema or warmth.  NEUROLOGIC:   Cranial nerves II through XII without deficit.  Motor strength equal bilaterally.  Sensation normal to touch.  Deep tendon reflexes 2+ and equal.  Gait without abnormality.  No tremor.  Negative cerebellar signs.  Negative Romberg.    ASSESSMENT:  1. Migraine without status migrainosus, not intractable, unspecified migraine type    2. Neck pain    3. Situational stress      PLAN:   1.  Fioricet 1-2 tablets every 4-6 hours as needed for headache.  2.  Consider abortive and/or prophylactic medication if no resolution.  3.  Follow-up with no  improvement or worsening symptoms.  4.  Consider imaging studies.

## 2017-06-20 ENCOUNTER — PATIENT MESSAGE (OUTPATIENT)
Dept: FAMILY MEDICINE | Facility: CLINIC | Age: 62
End: 2017-06-20

## 2017-06-21 RX ORDER — ALPRAZOLAM 0.25 MG/1
.25-.5 TABLET ORAL 3 TIMES DAILY PRN
Qty: 30 TABLET | Refills: 0 | Status: SHIPPED | OUTPATIENT
Start: 2017-06-21 | End: 2017-12-19

## 2017-07-20 DIAGNOSIS — K59.00 CONSTIPATION, UNSPECIFIED CONSTIPATION TYPE: ICD-10-CM

## 2017-07-20 RX ORDER — LUBIPROSTONE 24 UG/1
24 CAPSULE, GELATIN COATED ORAL 2 TIMES DAILY WITH MEALS
Qty: 60 CAPSULE | Refills: 1 | Status: SHIPPED | OUTPATIENT
Start: 2017-07-20 | End: 2017-09-16 | Stop reason: SDUPTHER

## 2017-09-16 DIAGNOSIS — K59.00 CONSTIPATION, UNSPECIFIED CONSTIPATION TYPE: ICD-10-CM

## 2017-09-16 RX ORDER — LUBIPROSTONE 24 UG/1
24 CAPSULE, GELATIN COATED ORAL 2 TIMES DAILY WITH MEALS
Qty: 60 CAPSULE | Refills: 0 | Status: SHIPPED | OUTPATIENT
Start: 2017-09-16 | End: 2017-11-28 | Stop reason: SDUPTHER

## 2017-11-02 ENCOUNTER — TELEPHONE (OUTPATIENT)
Dept: FAMILY MEDICINE | Facility: CLINIC | Age: 62
End: 2017-11-02

## 2017-11-02 DIAGNOSIS — Z12.31 ENCOUNTER FOR SCREENING MAMMOGRAM FOR MALIGNANT NEOPLASM OF BREAST: Primary | ICD-10-CM

## 2017-11-08 DIAGNOSIS — K59.00 CONSTIPATION, UNSPECIFIED CONSTIPATION TYPE: ICD-10-CM

## 2017-11-09 RX ORDER — LUBIPROSTONE 24 UG/1
24 CAPSULE, GELATIN COATED ORAL 2 TIMES DAILY WITH MEALS
Qty: 60 CAPSULE | OUTPATIENT
Start: 2017-11-09

## 2017-11-17 ENCOUNTER — HOSPITAL ENCOUNTER (OUTPATIENT)
Dept: RADIOLOGY | Facility: HOSPITAL | Age: 62
Discharge: HOME | End: 2017-11-17
Attending: FAMILY MEDICINE
Payer: COMMERCIAL

## 2017-11-17 VITALS — HEIGHT: 63 IN | WEIGHT: 145 LBS | BODY MASS INDEX: 25.69 KG/M2

## 2017-11-17 DIAGNOSIS — Z12.31 ENCOUNTER FOR SCREENING MAMMOGRAM FOR MALIGNANT NEOPLASM OF BREAST: ICD-10-CM

## 2017-11-17 PROCEDURE — 77063 BREAST TOMOSYNTHESIS BI: CPT | Mod: 26,,, | Performed by: RADIOLOGY

## 2017-11-17 PROCEDURE — 77067 SCR MAMMO BI INCL CAD: CPT | Mod: TC

## 2017-11-17 PROCEDURE — 77067 SCR MAMMO BI INCL CAD: CPT | Mod: 26,,, | Performed by: RADIOLOGY

## 2017-11-21 DIAGNOSIS — K59.00 CONSTIPATION, UNSPECIFIED CONSTIPATION TYPE: ICD-10-CM

## 2017-11-22 DIAGNOSIS — K59.00 CONSTIPATION, UNSPECIFIED CONSTIPATION TYPE: ICD-10-CM

## 2017-11-22 RX ORDER — LUBIPROSTONE 24 UG/1
24 CAPSULE, GELATIN COATED ORAL 2 TIMES DAILY WITH MEALS
Qty: 60 CAPSULE | OUTPATIENT
Start: 2017-11-22

## 2017-11-28 ENCOUNTER — TELEPHONE (OUTPATIENT)
Dept: GASTROENTEROLOGY | Facility: HOSPITAL | Age: 62
End: 2017-11-28

## 2017-11-28 ENCOUNTER — TELEPHONE (OUTPATIENT)
Dept: GASTROENTEROLOGY | Facility: CLINIC | Age: 62
End: 2017-11-28

## 2017-11-28 DIAGNOSIS — K59.00 CONSTIPATION, UNSPECIFIED CONSTIPATION TYPE: ICD-10-CM

## 2017-11-28 RX ORDER — LUBIPROSTONE 24 UG/1
24 CAPSULE ORAL 2 TIMES DAILY WITH MEALS
Qty: 60 CAPSULE | Refills: 0 | Status: SHIPPED | OUTPATIENT
Start: 2017-11-28 | End: 2017-12-19

## 2017-11-28 NOTE — TELEPHONE ENCOUNTER
----- Message from Jessica Hernandez LPN sent at 11/28/2017  9:57 AM CST -----  Regarding: Med refill  Ms Bourgeois would like for you to refill her amitiza until she can make it to her apt with Nita on 12/19/17. She is completely out.

## 2017-12-04 ENCOUNTER — PATIENT MESSAGE (OUTPATIENT)
Dept: FAMILY MEDICINE | Facility: CLINIC | Age: 62
End: 2017-12-04

## 2017-12-04 RX ORDER — BUTALBITAL, ACETAMINOPHEN AND CAFFEINE 50; 325; 40 MG/1; MG/1; MG/1
1 TABLET ORAL
Qty: 60 TABLET | Refills: 0 | Status: SHIPPED | OUTPATIENT
Start: 2017-12-04 | End: 2018-01-03

## 2017-12-19 ENCOUNTER — OFFICE VISIT (OUTPATIENT)
Dept: GASTROENTEROLOGY | Facility: CLINIC | Age: 62
End: 2017-12-19
Payer: COMMERCIAL

## 2017-12-19 VITALS
HEART RATE: 72 BPM | DIASTOLIC BLOOD PRESSURE: 72 MMHG | WEIGHT: 155.63 LBS | BODY MASS INDEX: 28.64 KG/M2 | HEIGHT: 62 IN | SYSTOLIC BLOOD PRESSURE: 122 MMHG

## 2017-12-19 DIAGNOSIS — E03.9 HYPOTHYROIDISM, UNSPECIFIED TYPE: ICD-10-CM

## 2017-12-19 DIAGNOSIS — K59.04 CHRONIC IDIOPATHIC CONSTIPATION: Primary | ICD-10-CM

## 2017-12-19 PROCEDURE — 99214 OFFICE O/P EST MOD 30 MIN: CPT | Mod: S$GLB,,, | Performed by: NURSE PRACTITIONER

## 2017-12-19 PROCEDURE — 99999 PR PBB SHADOW E&M-EST. PATIENT-LVL III: CPT | Mod: PBBFAC,,, | Performed by: NURSE PRACTITIONER

## 2017-12-19 RX ORDER — THYROID 90 MG/1
1 TABLET ORAL
COMMUNITY

## 2017-12-19 NOTE — PROGRESS NOTES
Clinic Follow Up:  [facility] Follow Up Note    Reason for Follow Up:  The primary encounter diagnosis was Chronic idiopathic constipation. A diagnosis of Hypothyroidism, unspecified type was also pertinent to this visit.    PCP: Dana Johnson       HPI:  This is a 62 y.o. female here for follow up of constipation. She is a previous patient of Dr. Yeung but is She has been well controlled on high dose Amitiza up until the last 6 months. She is having bowel movements regularly but reports the feeling of incomplete evaluation. She also has hypothyroidism and recently switched thyroid medications. She says she is due for repeat thyroid levels in the next few weeks. No abdominal pain, hematochezia, melena, nausea, vomiting, or weight loss.     Review of Systems   Constitutional: Negative for activity change and appetite change.   HENT: Negative for sore throat and trouble swallowing.    Eyes: Negative for pain and discharge.   Respiratory: Negative for cough and chest tightness.    Cardiovascular: Negative for chest pain and palpitations.   Genitourinary: Negative for dysuria, frequency and hematuria.   Skin: Negative for color change and rash.   Neurological: Negative for speech difficulty, weakness and headaches.   Psychiatric/Behavioral: Negative for confusion and sleep disturbance.       Medical History:  Past Medical History:   Diagnosis Date    Chronic constipation     Depression     Dry eyes     Endometriosis of broad ligament     Hypothyroidism     Macular degeneration     Migraine headache     Osteoarthritis of hand     Seasonal allergic rhinitis        Surgical History:   Past Surgical History:   Procedure Laterality Date    CERVICAL CONIZATION   W/ LASER      DILATION AND CURETTAGE OF UTERUS      HYSTERECTOMY  2008    complete    LASIK  2007    LIPOMA RESECTION  2010    right thigh    TOTAL ABDOMINAL HYSTERECTOMY W/ BILATERAL SALPINGOOPHORECTOMY  March 2008        Family History:   Family History   Problem Relation Age of Onset    Hypertension Mother     Heart disease Mother     Dementia Mother     Diabetes Father     Hypertension Father     Heart disease Father     Heart failure Father     Glaucoma Maternal Uncle     Amblyopia Neg Hx     Blindness Neg Hx     Cancer Neg Hx     Cataracts Neg Hx     Macular degeneration Neg Hx     Retinal detachment Neg Hx     Strabismus Neg Hx     Stroke Neg Hx     Thyroid disease Neg Hx        Social History:   Social History   Substance Use Topics    Smoking status: Former Smoker     Quit date: 1/7/1983    Smokeless tobacco: Never Used      Comment: quit 33 years ago     Alcohol use Yes      Comment: socially (<1/week)       Allergies:   Review of patient's allergies indicates:   Allergen Reactions    Climara [estradiol]      Glue on climara patch       Home Medications:  Current Outpatient Prescriptions on File Prior to Visit   Medication Sig Dispense Refill    aspirin (ECOTRIN) 81 MG EC tablet Take 81 mg by mouth once daily.      butalbital-acetaminophen-caffeine -40 mg (FIORICET, ESGIC) -40 mg per tablet Take 1 tablet by mouth every 4 to 6 hours as needed for Headaches. 60 tablet 0    meloxicam (MOBIC) 15 MG tablet TAKE 1 TABLET BY MOUTH EVERY DAY *TAKE WITH FOOD* **MAY CAUSE DROWSINESS** 30 tablet 2    metoprolol succinate (TOPROL-XL) 25 MG 24 hr tablet Take 12.5 mg by mouth once daily.       multivitamin with minerals (MULTI-VITAMIN W/MINERALS) Cap Take 2 capsules by mouth Daily.      RESTASIS 0.05 % ophthalmic emulsion INSTILL ONE DROP IN EACH EYE TWICE DAILY 60 vial 11    [DISCONTINUED] lubiprostone (AMITIZA) 24 MCG Cap Take 1 capsule (24 mcg total) by mouth 2 (two) times daily with meals. 60 capsule 0    [DISCONTINUED] acrivastine-pseudoephedrine 8-60 mg Cap Take 1 tablet by mouth every 4 to 6 hours as needed. 30 each 0    [DISCONTINUED] alprazolam (XANAX) 0.25 MG tablet Take 1-2  "tablets (0.25-0.5 mg total) by mouth 3 (three) times daily as needed for Anxiety. 30 tablet 0    [DISCONTINUED] thyroid, pork, 81.25 mg Tab Take 81.25 mg by mouth once daily.       No current facility-administered medications on file prior to visit.        Physical Exam:  Vital Signs:  /72   Pulse 72   Ht 5' 2" (1.575 m)   Wt 70.6 kg (155 lb 10.3 oz)   BMI 28.47 kg/m²   Body mass index is 28.47 kg/m².  Physical Exam   Constitutional: She is oriented to person, place, and time and well-developed, well-nourished, and in no distress. No distress.   HENT:   Head: Normocephalic.   Eyes: Conjunctivae are normal. Pupils are equal, round, and reactive to light.   Cardiovascular: Normal rate, regular rhythm and normal heart sounds.    Pulmonary/Chest: Effort normal and breath sounds normal. No respiratory distress.   Abdominal: Soft. Bowel sounds are normal. She exhibits no distension. There is no tenderness.   Neurological: She is alert and oriented to person, place, and time. No cranial nerve deficit.   Skin: Skin is warm and dry. No rash noted.   Psychiatric: Mood and affect normal.       Labs: Pertinent labs reviewed.    CRC Screening: up to date    Assessment:  1. Chronic idiopathic constipation    2. Hypothyroidism, unspecified type        Recommendations:  Chronic idiopathic constipation  - Increase Linzess dosage  -     linaclotide (LINZESS) 290 mcg Cap; Take 1 capsule (290 mcg total) by mouth once daily.  Dispense: 30 capsule; Refill: 5    Hypothyroidism, unspecified type  - If uncontrolled, may be contributing factor.  - Management per PCP    Return to Clinic:  Return in about 1 year (around 12/19/2018), or if symptoms worsen or fail to improve.    Thank you for the opportunity to participate in the care of this patient.  MIGUEL Jeronimo      "

## 2018-01-31 ENCOUNTER — OFFICE VISIT (OUTPATIENT)
Dept: FAMILY MEDICINE | Facility: CLINIC | Age: 63
End: 2018-01-31
Payer: COMMERCIAL

## 2018-01-31 VITALS
HEIGHT: 62 IN | HEART RATE: 73 BPM | BODY MASS INDEX: 29.3 KG/M2 | TEMPERATURE: 98 F | SYSTOLIC BLOOD PRESSURE: 130 MMHG | OXYGEN SATURATION: 97 % | WEIGHT: 159.19 LBS | RESPIRATION RATE: 18 BRPM | DIASTOLIC BLOOD PRESSURE: 80 MMHG

## 2018-01-31 DIAGNOSIS — J32.9 SINUSITIS, UNSPECIFIED CHRONICITY, UNSPECIFIED LOCATION: Primary | ICD-10-CM

## 2018-01-31 PROCEDURE — 3008F BODY MASS INDEX DOCD: CPT | Mod: S$GLB,,, | Performed by: REGISTERED NURSE

## 2018-01-31 PROCEDURE — 96372 THER/PROPH/DIAG INJ SC/IM: CPT | Mod: S$GLB,,, | Performed by: FAMILY MEDICINE

## 2018-01-31 PROCEDURE — 99999 PR PBB SHADOW E&M-EST. PATIENT-LVL IV: CPT | Mod: PBBFAC,,, | Performed by: REGISTERED NURSE

## 2018-01-31 PROCEDURE — 99213 OFFICE O/P EST LOW 20 MIN: CPT | Mod: 25,S$GLB,, | Performed by: REGISTERED NURSE

## 2018-01-31 RX ORDER — BENZONATATE 100 MG/1
100 CAPSULE ORAL 3 TIMES DAILY PRN
Qty: 45 CAPSULE | Refills: 0 | Status: SHIPPED | OUTPATIENT
Start: 2018-01-31 | End: 2019-02-25

## 2018-01-31 RX ORDER — AZITHROMYCIN 250 MG/1
TABLET, FILM COATED ORAL
Qty: 6 TABLET | Refills: 0 | Status: SHIPPED | OUTPATIENT
Start: 2018-01-31 | End: 2018-04-24

## 2018-01-31 RX ORDER — BETAMETHASONE SODIUM PHOSPHATE AND BETAMETHASONE ACETATE 3; 3 MG/ML; MG/ML
12 INJECTION, SUSPENSION INTRA-ARTICULAR; INTRALESIONAL; INTRAMUSCULAR; SOFT TISSUE
Status: COMPLETED | OUTPATIENT
Start: 2018-01-31 | End: 2018-01-31

## 2018-01-31 RX ORDER — METOPROLOL TARTRATE 25 MG/1
12.5 TABLET, FILM COATED ORAL 2 TIMES DAILY
Refills: 9 | COMMUNITY
Start: 2018-01-02

## 2018-01-31 RX ADMIN — BETAMETHASONE SODIUM PHOSPHATE AND BETAMETHASONE ACETATE 12 MG: 3; 3 INJECTION, SUSPENSION INTRA-ARTICULAR; INTRALESIONAL; INTRAMUSCULAR; SOFT TISSUE at 11:01

## 2018-01-31 NOTE — PROGRESS NOTES
"Subjective:       Patient ID: Yolis Bourgeois is a 62 y.o. female.    Chief Complaint: Cough and Chest Congestion      HPI    Mrs. Bourgeois has been ill for the past 2 weeks, waxing and waning.    OTC meds have not helped.    Reports ST, PND, ear pain and cough.    Does c/o HA and sinus pressure.      Review of Systems   Constitutional: Negative for chills and fever.   HENT: Positive for congestion, ear pain, postnasal drip, rhinorrhea, sinus pressure and sore throat. Negative for nosebleeds, trouble swallowing and voice change.    Eyes: Negative.    Respiratory: Positive for cough. Negative for wheezing.    Cardiovascular: Negative.    Neurological: Positive for headaches. Negative for weakness, light-headedness and numbness.   Hematological: Negative for adenopathy.         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 She appears well-developed and well-nourished.   HENT:   Head: Normocephalic and atraumatic.   Right Ear: Tympanic membrane normal.   Left Ear: Tympanic membrane normal.   Nose: Mucosal edema and rhinorrhea present. Right sinus exhibits frontal sinus tenderness. Right sinus exhibits no maxillary sinus tenderness. Left sinus exhibits frontal sinus tenderness. Left sinus exhibits no maxillary sinus tenderness.   Mouth/Throat: " Posterior oropharyngeal erythema present. No oropharyngeal exudate or posterior oropharyngeal edema.   Eyes: Conjunctivae and EOM are normal. Pupils are equal, round, and reactive to light.   Cardiovascular: Normal rate, regular rhythm and normal heart sounds.    Pulmonary/Chest: Effort normal and breath sounds normal.   Lymphadenopathy:     She has no cervical adenopathy.   Neurological: She is alert and oriented to person, place, and time.   Vitals reviewed.        Medication List with Changes/Refills   New Medications    AZITHROMYCIN (ZITHROMAX Z-WENDY) 250 MG TABLET    Take 2 tab today, then 1 tab daily x 4 days.    BENZONATATE (TESSALON) 100 MG CAPSULE    Take 1 capsule (100 mg total) by mouth 3 (three) times daily as needed for Cough.   Current Medications    ASPIRIN (ECOTRIN) 81 MG EC TABLET    Take 81 mg by mouth once daily.    LINACLOTIDE (LINZESS) 290 MCG CAP    Take 1 capsule (290 mcg total) by mouth once daily.    MELOXICAM (MOBIC) 15 MG TABLET    TAKE 1 TABLET BY MOUTH EVERY DAY *TAKE WITH FOOD* **MAY CAUSE DROWSINESS**    METOPROLOL TARTRATE (LOPRESSOR) 25 MG TABLET    Take 12.5 mg by mouth 2 (two) times daily.    MULTIVITAMIN WITH MINERALS (MULTI-VITAMIN W/MINERALS) CAP    Take 2 capsules by mouth Daily.    RESTASIS 0.05 % OPHTHALMIC EMULSION    INSTILL ONE DROP IN EACH EYE TWICE DAILY    THYROID, PORK, (NP THYROID) 90 MG TAB    Take 1 tablet by mouth before breakfast.   Discontinued Medications    METOPROLOL SUCCINATE (TOPROL-XL) 25 MG 24 HR TABLET    Take 12.5 mg by mouth once daily.            Diagnosis       1. Sinusitis, unspecified chronicity, unspecified location          Assessment/ Plan     Sinusitis, unspecified chronicity, unspecified location  -     betamethasone acetate-betamethasone sodium phosphate injection 12 mg; Inject 2 mLs (12 mg total) into the muscle one time.  -     azithromycin (ZITHROMAX Z-WENDY) 250 MG tablet; Take 2 tab today, then 1 tab daily x 4 days.  Dispense: 6 tablet;  Refill: 0  -     benzonatate (TESSALON) 100 MG capsule; Take 1 capsule (100 mg total) by mouth 3 (three) times daily as needed for Cough.  Dispense: 45 capsule; Refill: 0        Symptomatic care, rest, fluids, hydration.  Follow-up in clinic as needed.        FAHAD Blackburn  [facility]

## 2018-02-13 ENCOUNTER — PATIENT MESSAGE (OUTPATIENT)
Dept: GASTROENTEROLOGY | Facility: CLINIC | Age: 63
End: 2018-02-13

## 2018-02-13 DIAGNOSIS — K59.04 CHRONIC IDIOPATHIC CONSTIPATION: Primary | ICD-10-CM

## 2018-02-28 ENCOUNTER — PATIENT MESSAGE (OUTPATIENT)
Dept: OPHTHALMOLOGY | Facility: CLINIC | Age: 63
End: 2018-02-28

## 2018-02-28 ENCOUNTER — PATIENT MESSAGE (OUTPATIENT)
Dept: GASTROENTEROLOGY | Facility: CLINIC | Age: 63
End: 2018-02-28

## 2018-03-19 ENCOUNTER — OFFICE VISIT (OUTPATIENT)
Dept: OPHTHALMOLOGY | Facility: CLINIC | Age: 63
End: 2018-03-19
Payer: COMMERCIAL

## 2018-03-19 DIAGNOSIS — H04.129 DRYNESS, EYE: ICD-10-CM

## 2018-03-19 DIAGNOSIS — H35.54 MACULAR PATTERN DYSTROPHY: ICD-10-CM

## 2018-03-19 DIAGNOSIS — H40.003 GLAUCOMA SUSPECT OF BOTH EYES: ICD-10-CM

## 2018-03-19 DIAGNOSIS — H35.433 PAVING STONE RETINAL DEGENERATION OF BOTH EYES: Primary | ICD-10-CM

## 2018-03-19 PROCEDURE — 92014 COMPRE OPH EXAM EST PT 1/>: CPT | Mod: S$GLB,,, | Performed by: OPHTHALMOLOGY

## 2018-03-19 PROCEDURE — 76514 ECHO EXAM OF EYE THICKNESS: CPT | Mod: S$GLB,,, | Performed by: OPHTHALMOLOGY

## 2018-03-19 PROCEDURE — 92133 CPTRZD OPH DX IMG PST SGM ON: CPT | Mod: S$GLB,,, | Performed by: OPHTHALMOLOGY

## 2018-03-19 PROCEDURE — 99999 PR PBB SHADOW E&M-EST. PATIENT-LVL II: CPT | Mod: PBBFAC,,, | Performed by: OPHTHALMOLOGY

## 2018-03-19 NOTE — PROGRESS NOTES
===============================  03/19/2018   Yolis Bourgeois,   62 y.o. female   Last visit [facility]: :3/17/2017   Last visit eye dept. Visit date not found  [facility]  Uncorrected [place] visual acuity was 20/30 in the right eye and 20/30 in the left eye.  Tonometry     Tonometry (Applanation, 8:23 AM)       Right Left    Pressure 14 14               Not recorded         Not recorded        Chief Complaint   Patient presents with    cobblestone     pt states vision seems good    Dry Eye     using naphcon a 2 x a month        HPI     cobblestone    Additional comments: pt states vision seems good           Dry Eye    Additional comments: using naphcon a 2 x a month           Comments   1. Anatomic variant mac degen  2. Cobblestone od  3. Dry eyes  Punctal plugs ou  4. H/O Lasik  5. H/O Iritis os        Restasis BID OU  Naphcon A QAM OU  Systane Wipes       Last edited by CARLA Buck on 3/19/2018  8:17 AM. 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

## 2018-04-10 RX ORDER — MELOXICAM 15 MG/1
TABLET ORAL
Qty: 30 TABLET | Refills: 0 | Status: SHIPPED | OUTPATIENT
Start: 2018-04-10 | End: 2018-08-13 | Stop reason: SDUPTHER

## 2018-04-23 ENCOUNTER — TELEPHONE (OUTPATIENT)
Dept: FAMILY MEDICINE | Facility: CLINIC | Age: 63
End: 2018-04-23

## 2018-04-23 NOTE — TELEPHONE ENCOUNTER
----- Message from Kalyani Garcia sent at 4/23/2018  3:42 PM CDT -----  Contact: pt   Call pt regarding if she can come in to have tech shot on tomorrow. Pt states that she stuck herself in the hand with a pair of scissor.     ..[phone] (home)

## 2018-04-23 NOTE — TELEPHONE ENCOUNTER
lp 10/30/15  lov 6/2/17  Last tdap 6/2013  Pt states she stuck her self in the hand with some scissors and wants to come in to have a tdap injection.

## 2018-04-24 ENCOUNTER — OFFICE VISIT (OUTPATIENT)
Dept: OPHTHALMOLOGY | Facility: CLINIC | Age: 63
End: 2018-04-24
Payer: COMMERCIAL

## 2018-04-24 DIAGNOSIS — H40.1131 PRIMARY OPEN ANGLE GLAUCOMA (POAG) OF BOTH EYES, MILD STAGE: Primary | ICD-10-CM

## 2018-04-24 PROBLEM — H40.003 GLAUCOMA SUSPECT OF BOTH EYES: Status: RESOLVED | Noted: 2018-03-19 | Resolved: 2018-04-24

## 2018-04-24 PROCEDURE — 99999 PR PBB SHADOW E&M-EST. PATIENT-LVL II: CPT | Mod: PBBFAC,,, | Performed by: OPHTHALMOLOGY

## 2018-04-24 PROCEDURE — 92083 EXTENDED VISUAL FIELD XM: CPT | Mod: S$GLB,,, | Performed by: OPHTHALMOLOGY

## 2018-04-24 PROCEDURE — 99213 OFFICE O/P EST LOW 20 MIN: CPT | Mod: S$GLB,,, | Performed by: OPHTHALMOLOGY

## 2018-04-24 RX ORDER — LATANOPROST 50 UG/ML
1 SOLUTION/ DROPS OPHTHALMIC DAILY
Qty: 2.5 ML | Refills: 11 | Status: SHIPPED | OUTPATIENT
Start: 2018-04-24 | End: 2019-05-29 | Stop reason: SDUPTHER

## 2018-04-24 NOTE — PROGRESS NOTES
===============================  04/24/2018   Yolis Bourgeois,   62 y.o. female   Last visit [facility]: :3/19/2018   Last visit eye dept. 3/19/2018  [facility]  Uncorrected distance visual acuity was 20/30 in the right eye and 20/30 in the left eye.   Not recorded         Not recorded         Not recorded        Chief Complaint   Patient presents with    Glaucoma Suspect     iop ck and VF review     Ophthalmic Medications     Ophthalmic-Intraocular Pressure Reducing Agents, Prostaglandin Analogs Start End    latanoprost (XALATAN) 0.005 % ophthalmic solution 4/24/2018 4/24/2019    Sig: Place 1 drop into both eyes once daily.    Route: Both Eyes         HPI     Glaucoma Suspect    Additional comments: iop ck and VF review           Comments   1. Anatomic variant mac degen  2. Cobblestone od  3. Dry eyes  Punctal plugs ou  4. H/O Lasik  5. H/O Iritis os  HVF 4/24/18        Restasis BID OU  Naphcon A QAM OU  Systane Wipes       Last edited by CARLA Buck on 4/24/2018 11:28 AM. (History)          ________________  4/24/2018  Problem List Items Addressed This Visit        Eye/Vision problems    Primary open angle glaucoma (POAG) of both eyes, mild stage - Primary    Relevant Orders    Sung Visual Field - OU - Extended - Both Eyes        Coag   VF od bjerrum od / os normal  Tilted dsic - thin rim  Begin xalatn- rtc 2 months    .       ===========================

## 2018-05-01 RX ORDER — CYCLOSPORINE 0.5 MG/ML
1 EMULSION OPHTHALMIC 2 TIMES DAILY
Qty: 60 VIAL | Refills: 11 | Status: SHIPPED | OUTPATIENT
Start: 2018-05-01 | End: 2019-05-20

## 2018-05-01 RX ORDER — CYCLOSPORINE 0.5 MG/ML
1 EMULSION OPHTHALMIC 2 TIMES DAILY
Qty: 60 VIAL | Refills: 11 | Status: SHIPPED | OUTPATIENT
Start: 2018-05-01 | End: 2018-05-17

## 2018-05-10 ENCOUNTER — TELEPHONE (OUTPATIENT)
Dept: RADIOLOGY | Facility: HOSPITAL | Age: 63
End: 2018-05-10

## 2018-05-11 ENCOUNTER — HOSPITAL ENCOUNTER (OUTPATIENT)
Dept: RADIOLOGY | Facility: HOSPITAL | Age: 63
Discharge: HOME | End: 2018-05-11
Attending: UROLOGY
Payer: COMMERCIAL

## 2018-05-11 DIAGNOSIS — N28.1 RENAL CYST: ICD-10-CM

## 2018-05-11 PROCEDURE — 76770 US EXAM ABDO BACK WALL COMP: CPT | Mod: TC

## 2018-05-11 PROCEDURE — 76770 US EXAM ABDO BACK WALL COMP: CPT | Mod: 26,,, | Performed by: RADIOLOGY

## 2018-05-17 ENCOUNTER — OFFICE VISIT (OUTPATIENT)
Dept: UROLOGY | Facility: CLINIC | Age: 63
End: 2018-05-17
Payer: COMMERCIAL

## 2018-05-17 VITALS
HEIGHT: 63 IN | WEIGHT: 155.88 LBS | DIASTOLIC BLOOD PRESSURE: 76 MMHG | BODY MASS INDEX: 27.62 KG/M2 | SYSTOLIC BLOOD PRESSURE: 122 MMHG

## 2018-05-17 DIAGNOSIS — N28.1 RENAL CYST: Primary | ICD-10-CM

## 2018-05-17 LAB
BILIRUB SERPL-MCNC: NORMAL MG/DL
BLOOD URINE, POC: NORMAL
COLOR, POC UA: YELLOW
GLUCOSE UR QL STRIP: NORMAL
KETONES UR QL STRIP: NORMAL
LEUKOCYTE ESTERASE URINE, POC: NORMAL
NITRITE, POC UA: NORMAL
PH, POC UA: 6
PROTEIN, POC: NORMAL
SPECIFIC GRAVITY, POC UA: 1.02
UROBILINOGEN, POC UA: NORMAL

## 2018-05-17 PROCEDURE — 99213 OFFICE O/P EST LOW 20 MIN: CPT | Mod: 25,S$GLB,, | Performed by: UROLOGY

## 2018-05-17 PROCEDURE — 99999 PR PBB SHADOW E&M-EST. PATIENT-LVL II: CPT | Mod: PBBFAC,,, | Performed by: UROLOGY

## 2018-05-17 PROCEDURE — 81002 URINALYSIS NONAUTO W/O SCOPE: CPT | Mod: S$GLB,,, | Performed by: UROLOGY

## 2018-05-17 RX ORDER — NALTREXONE HYDROCHLORIDE AND BUPROPION HYDROCHLORIDE 8; 90 MG/1; MG/1
TABLET, EXTENDED RELEASE ORAL
Refills: 2 | COMMUNITY
Start: 2018-05-07 | End: 2019-02-25

## 2018-05-17 NOTE — PROGRESS NOTES
Chief Complaint: Renal cysts    HPI:   5/17/18: No problems in the interval. Bilateral cysts same.  5/11/17: 62 yo woman followed for 4 years for renal cysts.  Had a pyelonephritis on the way.  Has a 2.5 cm right Adal II cyst that looks stable over many years.    Allergies:  Climara [estradiol]    Medications: has a current medication list which includes the following prescription(s): aspirin, benzonatate, cyclosporine, latanoprost, linaclotide, meloxicam, metoprolol tartrate, multivitamin with minerals, thyroid (pork), and contrave.    Review of Systems:  General: No fever, chills, fatigability, or weight loss.  Skin: No rashes, itching, or changes in color or texture of skin.  Chest: Denies NIEVES, cyanosis, wheezing, cough, and sputum production.  Abdomen: Appetite fine. No weight loss. Denies diarrhea, abdominal pain, hematemesis, or blood in stool.  Musculoskeletal: No joint stiffness or swelling. Denies back pain.  : As above.  All other review of systems negative.    PMH:   has a past medical history of Chronic constipation; Depression; Dry eyes; Endometriosis of broad ligament; Hypothyroidism; Macular degeneration; Migraine headache; Osteoarthritis of hand; Primary open angle glaucoma (POAG) of both eyes, mild stage (4/24/2018); and Seasonal allergic rhinitis.    PSH:   has a past surgical history that includes Lipoma resection (2010); Total abdominal hysterectomy w/ bilateral salpingoophorectomy (March 2008); Cervical conization w/ laser; Dilation and curettage of uterus; LASIK (2007); and Hysterectomy (2008).    FamHx: family history includes Dementia in her mother; Diabetes in her father; Glaucoma in her maternal uncle; Heart disease in her father and mother; Heart failure in her father; Hypertension in her father and mother.    SocHx:  reports that she quit smoking about 35 years ago. She has never used smokeless tobacco. She reports that she drinks alcohol. She reports that she does not use drugs.      Physical Exam:  Vitals:   Vitals:    05/17/18 1323   BP: 122/76     General: A&Ox3. No apparent distress. No deformities.  Neck: No masses. Normal thyroid.  Lungs: normal inspiration. No use of accessory muscles.  Heart: normal pulse. No arrhythmias.  Abdomen: Soft. NT. ND  Skin: The skin is warm and dry. No jaundice.  Ext: No c/c/e.  : deferred    Labs/Studies:     Impression/Plan:   1. Unlikely this renal cyst will ever be a problem.  But after discussion again agreed to the reassurance of annual US.

## 2018-06-26 ENCOUNTER — OFFICE VISIT (OUTPATIENT)
Dept: OPHTHALMOLOGY | Facility: CLINIC | Age: 63
End: 2018-06-26
Payer: COMMERCIAL

## 2018-06-26 DIAGNOSIS — H40.1131 PRIMARY OPEN ANGLE GLAUCOMA (POAG) OF BOTH EYES, MILD STAGE: Primary | ICD-10-CM

## 2018-06-26 PROCEDURE — 92012 INTRM OPH EXAM EST PATIENT: CPT | Mod: S$GLB,,, | Performed by: OPHTHALMOLOGY

## 2018-06-26 PROCEDURE — 99999 PR PBB SHADOW E&M-EST. PATIENT-LVL II: CPT | Mod: PBBFAC,,, | Performed by: OPHTHALMOLOGY

## 2018-06-26 NOTE — PROGRESS NOTES
===============================  06/26/2018   Yolis Bourgeois,   62 y.o. female   Last visit [facility]: :4/24/2018   Last visit eye dept. 4/24/2018  [facility]  Uncorrected distance visual acuity was 20/30 in the right eye and 20/40 in the left eye.  Tonometry     Tonometry (Applanation, 8:00 AM)       Right Left    Pressure 15 15               Not recorded         Not recorded        Chief Complaint   Patient presents with    Glaucoma     2 month iop check     Ophthalmic Medications     Ophthalmic-Intraocular Pressure Reducing Agents, Prostaglandin Analogs Start End    latanoprost (XALATAN) 0.005 % ophthalmic solution 4/24/2018 4/24/2019    Sig: Place 1 drop into both eyes once daily.    Route: Both Eyes         HPI     Glaucoma    Additional comments: 2 month iop check           Comments   1. Anatomic variant mac degen  2. Cobblestone od  3. Dry eyes  Punctal plugs ou  4. H/O Lasik    LATANOPROST QHS OU  5. H/O Iritis os  HVF 4/24/18        Restasis BID OU  Naphcon A QAM OU  Systane Wipes       Last edited by Nessa Cheng on 6/26/2018  7:31 AM. (History)          ________________  6/26/2018  Problem List Items Addressed This Visit        Eye/Vision problems    Primary open angle glaucoma (POAG) of both eyes, mild stage - Primary      recent sinusitis  Seen in ER   sl red but ok  Better IOP, 15 OU  Instructed naphcon should only be used rarely  Continue latanoprost   rtc 4 mo iop check    .       ===========================

## 2018-08-13 RX ORDER — MELOXICAM 15 MG/1
TABLET ORAL
Qty: 30 TABLET | Refills: 0 | Status: SHIPPED | OUTPATIENT
Start: 2018-08-13 | End: 2018-12-28 | Stop reason: SDUPTHER

## 2018-09-06 ENCOUNTER — PATIENT MESSAGE (OUTPATIENT)
Dept: GASTROENTEROLOGY | Facility: CLINIC | Age: 63
End: 2018-09-06

## 2018-10-30 ENCOUNTER — OFFICE VISIT (OUTPATIENT)
Dept: OPHTHALMOLOGY | Facility: CLINIC | Age: 63
End: 2018-10-30
Payer: COMMERCIAL

## 2018-10-30 DIAGNOSIS — H40.1131 PRIMARY OPEN ANGLE GLAUCOMA (POAG) OF BOTH EYES, MILD STAGE: Primary | ICD-10-CM

## 2018-10-30 PROCEDURE — 92012 INTRM OPH EXAM EST PATIENT: CPT | Mod: S$GLB,,, | Performed by: OPHTHALMOLOGY

## 2018-10-30 PROCEDURE — 99999 PR PBB SHADOW E&M-EST. PATIENT-LVL II: CPT | Mod: PBBFAC,,, | Performed by: OPHTHALMOLOGY

## 2018-10-30 NOTE — PROGRESS NOTES
===============================  10/30/2018   Yolis Bourgeois,   63 y.o. female   Last visit [facility]: :6/26/2018   Last visit eye dept. 6/26/2018  [facility]  Uncorrected distance visual acuity was 20/30 +1 in the right eye and 20/30 -2 in the left eye.   Not recorded         Not recorded         Not recorded        Chief Complaint   Patient presents with    Glaucoma     Ophthalmic Medications     Ophthalmic-Intraocular Pressure Reducing Agents, Prostaglandin Analogs Start End    latanoprost (XALATAN) 0.005 % ophthalmic solution 4/24/2018 4/24/2019    Sig: Place 1 drop into both eyes once daily.    Route: Both Eyes         HPI     1. Anatomic variant mac degen  2. Cobblestone od  3. Dry eyes  Punctal plugs ou  4. H/O Lasik  5. H/O Iritis os  HVF 4/24/18        Restasis BID OU  Naphcon A QAM OU  Systane Wipes  Latanoprost OU qhs  Oasys tears      Last edited by SHERRY Simons MD on 10/30/2018  7:38 AM. (History)          ________________  10/30/2018  Problem List Items Addressed This Visit        Eye/Vision problems    Primary open angle glaucoma (POAG) of both eyes, mild stage - Primary          .  t 18  17    Last vf 4/18   3/18   cct +4 post lasik  iop ok  but keep in mind cct  Follow for now  Repeat VF and GOCT next visit  rtc 3-4 months             ===========================

## 2018-11-13 RX ORDER — FLUOXETINE HYDROCHLORIDE 20 MG/1
CAPSULE ORAL
Qty: 45 CAPSULE | OUTPATIENT
Start: 2018-11-13

## 2018-11-21 ENCOUNTER — PATIENT MESSAGE (OUTPATIENT)
Dept: FAMILY MEDICINE | Facility: CLINIC | Age: 63
End: 2018-11-21

## 2018-11-21 DIAGNOSIS — Z12.39 BREAST CANCER SCREENING: Primary | ICD-10-CM

## 2018-11-30 ENCOUNTER — HOSPITAL ENCOUNTER (OUTPATIENT)
Dept: RADIOLOGY | Facility: HOSPITAL | Age: 63
Discharge: HOME | End: 2018-11-30
Attending: REGISTERED NURSE
Payer: COMMERCIAL

## 2018-11-30 VITALS — WEIGHT: 155 LBS | BODY MASS INDEX: 27.46 KG/M2 | HEIGHT: 63 IN

## 2018-11-30 DIAGNOSIS — Z12.39 BREAST CANCER SCREENING: ICD-10-CM

## 2018-11-30 PROCEDURE — 77067 SCR MAMMO BI INCL CAD: CPT | Mod: 26,,, | Performed by: RADIOLOGY

## 2018-11-30 PROCEDURE — 77063 BREAST TOMOSYNTHESIS BI: CPT | Mod: 26,,, | Performed by: RADIOLOGY

## 2018-11-30 PROCEDURE — 77067 SCR MAMMO BI INCL CAD: CPT | Mod: TC,PO

## 2018-11-30 PROCEDURE — 77063 BREAST TOMOSYNTHESIS BI: CPT | Mod: TC,PO

## 2018-12-03 ENCOUNTER — LAB VISIT (OUTPATIENT)
Dept: LAB | Facility: HOSPITAL | Age: 63
End: 2018-12-03
Attending: REGISTERED NURSE
Payer: COMMERCIAL

## 2018-12-03 ENCOUNTER — OFFICE VISIT (OUTPATIENT)
Dept: FAMILY MEDICINE | Facility: CLINIC | Age: 63
End: 2018-12-03
Payer: COMMERCIAL

## 2018-12-03 VITALS
SYSTOLIC BLOOD PRESSURE: 128 MMHG | HEART RATE: 73 BPM | TEMPERATURE: 98 F | HEIGHT: 63 IN | WEIGHT: 153.44 LBS | OXYGEN SATURATION: 97 % | BODY MASS INDEX: 27.19 KG/M2 | DIASTOLIC BLOOD PRESSURE: 88 MMHG

## 2018-12-03 DIAGNOSIS — Z00.00 ANNUAL PHYSICAL EXAM: ICD-10-CM

## 2018-12-03 DIAGNOSIS — F43.22 ADJUSTMENT DISORDER WITH ANXIETY: ICD-10-CM

## 2018-12-03 DIAGNOSIS — Z00.00 ANNUAL PHYSICAL EXAM: Primary | ICD-10-CM

## 2018-12-03 DIAGNOSIS — K59.09 CHRONIC CONSTIPATION: ICD-10-CM

## 2018-12-03 DIAGNOSIS — I48.0 PAROXYSMAL ATRIAL FIBRILLATION: ICD-10-CM

## 2018-12-03 DIAGNOSIS — E03.9 ACQUIRED HYPOTHYROIDISM: ICD-10-CM

## 2018-12-03 DIAGNOSIS — J30.2 SEASONAL ALLERGIC RHINITIS, UNSPECIFIED TRIGGER: ICD-10-CM

## 2018-12-03 LAB
ALBUMIN SERPL BCP-MCNC: 3.8 G/DL
ALP SERPL-CCNC: 56 U/L
ALT SERPL W/O P-5'-P-CCNC: 24 U/L
ANION GAP SERPL CALC-SCNC: 6 MMOL/L
AST SERPL-CCNC: 22 U/L
BASOPHILS # BLD AUTO: 0.04 K/UL
BASOPHILS NFR BLD: 0.5 %
BILIRUB SERPL-MCNC: 0.4 MG/DL
BUN SERPL-MCNC: 14 MG/DL
CALCIUM SERPL-MCNC: 9.3 MG/DL
CHLORIDE SERPL-SCNC: 104 MMOL/L
CHOLEST SERPL-MCNC: 164 MG/DL
CHOLEST/HDLC SERPL: 2.6 {RATIO}
CO2 SERPL-SCNC: 27 MMOL/L
CREAT SERPL-MCNC: 0.8 MG/DL
DIFFERENTIAL METHOD: ABNORMAL
EOSINOPHIL # BLD AUTO: 0.1 K/UL
EOSINOPHIL NFR BLD: 1.1 %
ERYTHROCYTE [DISTWIDTH] IN BLOOD BY AUTOMATED COUNT: 12.2 %
EST. GFR  (AFRICAN AMERICAN): >60 ML/MIN/1.73 M^2
EST. GFR  (NON AFRICAN AMERICAN): >60 ML/MIN/1.73 M^2
GLUCOSE SERPL-MCNC: 87 MG/DL
HCT VFR BLD AUTO: 40.7 %
HDLC SERPL-MCNC: 64 MG/DL
HDLC SERPL: 39 %
HGB BLD-MCNC: 12.9 G/DL
IMM GRANULOCYTES # BLD AUTO: 0.03 K/UL
IMM GRANULOCYTES NFR BLD AUTO: 0.4 %
LDLC SERPL CALC-MCNC: 86.6 MG/DL
LYMPHOCYTES # BLD AUTO: 2.3 K/UL
LYMPHOCYTES NFR BLD: 31.3 %
MCH RBC QN AUTO: 31.1 PG
MCHC RBC AUTO-ENTMCNC: 31.7 G/DL
MCV RBC AUTO: 98 FL
MONOCYTES # BLD AUTO: 0.6 K/UL
MONOCYTES NFR BLD: 7.6 %
NEUTROPHILS # BLD AUTO: 4.4 K/UL
NEUTROPHILS NFR BLD: 59.1 %
NONHDLC SERPL-MCNC: 100 MG/DL
NRBC BLD-RTO: 0 /100 WBC
PLATELET # BLD AUTO: 293 K/UL
PMV BLD AUTO: 9.2 FL
POTASSIUM SERPL-SCNC: 4.4 MMOL/L
PROT SERPL-MCNC: 6.8 G/DL
RBC # BLD AUTO: 4.15 M/UL
SODIUM SERPL-SCNC: 137 MMOL/L
T4 FREE SERPL-MCNC: 0.84 NG/DL
TRIGL SERPL-MCNC: 67 MG/DL
TSH SERPL DL<=0.005 MIU/L-ACNC: 0.03 UIU/ML
WBC # BLD AUTO: 7.47 K/UL

## 2018-12-03 PROCEDURE — 90686 IIV4 VACC NO PRSV 0.5 ML IM: CPT | Mod: S$GLB,,, | Performed by: REGISTERED NURSE

## 2018-12-03 PROCEDURE — 84439 ASSAY OF FREE THYROXINE: CPT

## 2018-12-03 PROCEDURE — 99999 PR PBB SHADOW E&M-EST. PATIENT-LVL IV: CPT | Mod: PBBFAC,,, | Performed by: REGISTERED NURSE

## 2018-12-03 PROCEDURE — 80053 COMPREHEN METABOLIC PANEL: CPT

## 2018-12-03 PROCEDURE — 90471 IMMUNIZATION ADMIN: CPT | Mod: S$GLB,,, | Performed by: REGISTERED NURSE

## 2018-12-03 PROCEDURE — 80061 LIPID PANEL: CPT

## 2018-12-03 PROCEDURE — 99396 PREV VISIT EST AGE 40-64: CPT | Mod: 25,S$GLB,, | Performed by: REGISTERED NURSE

## 2018-12-03 PROCEDURE — 84443 ASSAY THYROID STIM HORMONE: CPT

## 2018-12-03 PROCEDURE — 36415 COLL VENOUS BLD VENIPUNCTURE: CPT | Mod: PO

## 2018-12-03 PROCEDURE — 85025 COMPLETE CBC W/AUTO DIFF WBC: CPT

## 2018-12-03 RX ORDER — METFORMIN HYDROCHLORIDE 500 MG/1
500 TABLET, EXTENDED RELEASE ORAL
COMMUNITY
Start: 2013-08-12 | End: 2018-12-03

## 2018-12-03 NOTE — PROGRESS NOTES
Subjective:       Patient ID: Yolis Bourgeois is a 63 y.o. female.    Chief Complaint: Annual Exam      HPI    Yolis Bourgeois is here today for an annual wellness exam.  I have reviewed the patient's medical history in detail and updated the computerized patient record.      Review of Systems   Constitutional: Negative.    HENT: Negative.    Eyes: Negative.    Respiratory: Negative.    Cardiovascular: Negative.    Gastrointestinal: Positive for constipation (chronic//on Linzess). Negative for abdominal pain, diarrhea, nausea and vomiting.   Endocrine: Negative for polydipsia, polyphagia and polyuria.   Genitourinary: Negative.    Musculoskeletal: Negative.    Skin: Negative.    Neurological: Negative.    Hematological: Negative for adenopathy. Does not bruise/bleed easily.   Psychiatric/Behavioral: Negative.          Review of patient's allergies indicates:   Allergen Reactions    Climara [estradiol]      Glue on climara patch       Current Outpatient Medications on File Prior to Visit   Medication Sig Dispense Refill    aspirin (ECOTRIN) 81 MG EC tablet Take 81 mg by mouth once daily.      benzonatate (TESSALON) 100 MG capsule Take 1 capsule (100 mg total) by mouth 3 (three) times daily as needed for Cough. 45 capsule 0    CONTRAVE 8-90 mg TbSR titation dosing then 1 tablet BY MOUTH TWICE DAILY MAY CAUSE DROWSINESS  2    cycloSPORINE (RESTASIS) 0.05 % ophthalmic emulsion Place 0.4 mLs (1 drop total) into both eyes 2 (two) times daily. 60 vial 11    latanoprost (XALATAN) 0.005 % ophthalmic solution Place 1 drop into both eyes once daily. 2.5 mL 11    linaclotide (LINZESS) 145 mcg Cap capsule Take 1 capsule (145 mcg total) by mouth once daily. 30 capsule 5    meloxicam (MOBIC) 15 MG tablet TAKE 1 TABLET BY MOUTH EVERY DAY *TAKE WITH FOOD* **MAY CAUSE DROWSINESS** 30 tablet 0    metoprolol tartrate (LOPRESSOR) 25 MG tablet Take 12.5 mg by mouth 2 (two) times daily.  9    multivitamin with minerals  (MULTI-VITAMIN W/MINERALS) Cap Take 2 capsules by mouth Daily.      thyroid, pork, (NP THYROID) 90 mg Tab Take 1 tablet by mouth before breakfast.       No current facility-administered medications on file prior to visit.        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    Corneal epithelial defect    Primary open angle glaucoma (POAG) of both eyes, mild stage       Past Medical History:   Diagnosis Date    Chronic constipation     Depression     Dry eyes     Endometriosis of broad ligament     Hypothyroidism     Macular degeneration     Migraine headache     Osteoarthritis of hand     Primary open angle glaucoma (POAG) of both eyes, mild stage 4/24/2018    Seasonal allergic rhinitis        Past Surgical History:   Procedure Laterality Date    CERVICAL CONIZATION   W/ LASER      DILATION AND CURETTAGE OF UTERUS      LASIK  2007    LIPOMA RESECTION RT thigh 2010    TOTAL ABDOMINAL HYSTERECTOMY W/ BILATERAL SALPINGOOPHORECTOMY  March 2008       Family History   Problem Relation Age of Onset    Hypertension Mother     Heart disease Mother     Dementia Mother     Diabetes Father     Hypertension Father     Heart disease Father     Heart failure Father     Glaucoma Maternal Uncle     Amblyopia Neg Hx     Blindness Neg Hx     Cancer Neg Hx     Cataracts Neg Hx     Macular degeneration Neg Hx     Retinal detachment Neg Hx     Strabismus Neg Hx     Stroke Neg Hx     Thyroid disease Neg Hx        Social History     Socioeconomic History    Marital status:      Spouse name: Keith    Number of children: 2   Occupational History    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   Tobacco Use    Smoking  "status: Former Smoker     Last attempt to quit: 1983     Years since quittin.9    Smokeless tobacco: Never Used    Tobacco comment: quit 33 years ago    Substance and Sexual Activity    Alcohol use: Yes     Comment: socially (<1/week)    Drug use: No    Sexual activity: Yes     Partners: Male     Birth control/protection: Surgical     Comment:              Objective:     Vitals:    18 0809   BP: 128/88   Pulse: 73   Temp: 98.1 °F (36.7 °C)   TempSrc: Oral   SpO2: 97%   Weight: 69.6 kg (153 lb 7 oz)   Height: 5' 3" (1.6 m)   PainSc: 0-No pain         Physical Exam   Constitutional: She is oriented to person, place, and time. She appears well-developed and well-nourished. No distress.   HENT:   Head: Normocephalic and atraumatic.   Right Ear: Tympanic membrane and ear canal normal.   Left Ear: Tympanic membrane and ear canal normal.   Nose: Nose normal.   Mouth/Throat: Oropharynx is clear and moist and mucous membranes are normal.   Eyes: Conjunctivae and EOM are normal. Pupils are equal, round, and reactive to light. Right eye exhibits no discharge. Left eye exhibits no discharge. No scleral icterus.   Neck: Normal range of motion. Neck supple. No JVD present. No tracheal deviation present. No thyromegaly present.   Cardiovascular: Normal rate, regular rhythm, normal heart sounds and intact distal pulses. Exam reveals no gallop and no friction rub.   No murmur heard.  Pulmonary/Chest: Effort normal and breath sounds normal. No stridor. No respiratory distress. She has no rales.   Abdominal: Soft. Bowel sounds are normal. She exhibits no distension and no mass. There is no tenderness.   Genitourinary:   Genitourinary Comments: Deferred pelvic//h/o hysterectomy.   Musculoskeletal: She exhibits no edema, tenderness or deformity.   Lymphadenopathy:     She has no cervical adenopathy.   Neurological: She is alert and oriented to person, place, and time. No sensory deficit. She exhibits normal " muscle tone. Coordination normal.   Skin: Skin is warm and dry. Capillary refill takes less than 2 seconds. She is not diaphoretic.   Psychiatric: She has a normal mood and affect. Her behavior is normal. Judgment and thought content normal.         Diagnosis       1. Annual physical exam    2. Acquired hypothyroidism    3. Paroxysmal atrial fibrillation    4. Seasonal allergic rhinitis, unspecified trigger    5. Chronic constipation    6. Adjustment disorder with anxiety          Assessment/ Plan     Annual physical exam  -     CBC auto differential; Future; Expected date: 12/03/2018  -     Comprehensive metabolic panel; Future; Expected date: 12/03/2018  -     TSH; Future; Expected date: 12/03/2018  -     Lipid panel; Future; Expected date: 12/03/2018    Acquired hypothyroidism  · Currently on thyroid medication as ordered, check lab.  -     CBC auto differential; Future; Expected date: 12/03/2018  -     Comprehensive metabolic panel; Future; Expected date: 12/03/2018  -     TSH; Future; Expected date: 12/03/2018  -     Lipid panel; Future; Expected date: 12/03/2018    Paroxysmal atrial fibrillation  · Stable, on beta-blocker as ordered, sees Dr. Abbasi//Cardiology yearly.    Seasonal allergic rhinitis, unspecified trigger  · Stable, no current complaints, takes med prn.    Chronic constipation  · Stable, on Linzess as ordered.    Adjustment disorder with anxiety  · Stable, no current complaints, on no medication at this time.            Lab pending.  Flu shot today.  Mammogram done 11/30/2018, reviewed.  Follow-up in clinic as needed.          FAHAD Blackburn  [facility]

## 2018-12-28 RX ORDER — MELOXICAM 15 MG/1
TABLET ORAL
Qty: 30 TABLET | Refills: 0 | Status: SHIPPED | OUTPATIENT
Start: 2018-12-28 | End: 2019-05-20 | Stop reason: SDUPTHER

## 2019-01-11 ENCOUNTER — PATIENT MESSAGE (OUTPATIENT)
Dept: FAMILY MEDICINE | Facility: CLINIC | Age: 64
End: 2019-01-11

## 2019-01-14 ENCOUNTER — OFFICE VISIT (OUTPATIENT)
Dept: FAMILY MEDICINE | Facility: CLINIC | Age: 64
End: 2019-01-14
Payer: COMMERCIAL

## 2019-01-14 VITALS
TEMPERATURE: 98 F | HEART RATE: 73 BPM | SYSTOLIC BLOOD PRESSURE: 132 MMHG | OXYGEN SATURATION: 97 % | WEIGHT: 160.69 LBS | DIASTOLIC BLOOD PRESSURE: 80 MMHG | BODY MASS INDEX: 28.47 KG/M2 | HEIGHT: 63 IN

## 2019-01-14 DIAGNOSIS — R59.1 LYMPHADENOPATHY: ICD-10-CM

## 2019-01-14 DIAGNOSIS — J04.0 LARYNGITIS: Primary | ICD-10-CM

## 2019-01-14 PROCEDURE — 99999 PR PBB SHADOW E&M-EST. PATIENT-LVL III: CPT | Mod: PBBFAC,,, | Performed by: REGISTERED NURSE

## 2019-01-14 PROCEDURE — 99999 PR PBB SHADOW E&M-EST. PATIENT-LVL III: ICD-10-PCS | Mod: PBBFAC,,, | Performed by: REGISTERED NURSE

## 2019-01-14 PROCEDURE — 99214 PR OFFICE/OUTPT VISIT, EST, LEVL IV, 30-39 MIN: ICD-10-PCS | Mod: S$GLB,,, | Performed by: REGISTERED NURSE

## 2019-01-14 PROCEDURE — 99214 OFFICE O/P EST MOD 30 MIN: CPT | Mod: S$GLB,,, | Performed by: REGISTERED NURSE

## 2019-01-14 RX ORDER — PREDNISONE 20 MG/1
TABLET ORAL
Qty: 10 TABLET | Refills: 0 | Status: SHIPPED | OUTPATIENT
Start: 2019-01-14 | End: 2019-02-25

## 2019-01-14 NOTE — PROGRESS NOTES
"Subjective:       Patient ID: Yolis Bourgeois is a 63 y.o. female.    Chief Complaint: Laryngitis      HPI    Yolis Bourgeois is here today with c/o intermittent hoarseness over the past month.  Swallowing fine, no problems.  Does tend to "crack my voice" with increased talking.  Has noticed some lumps in her neck possibly lymph nodes.  Denies fever, chills, cold symptoms or cough.      Review of Systems   Constitutional: Negative for chills and fever.   HENT: Positive for voice change. Negative for congestion, nosebleeds, postnasal drip, rhinorrhea, sinus pain, sore throat and trouble swallowing.    Eyes: Negative.    Respiratory: Negative.    Cardiovascular: Negative.    Neurological: Negative.        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      benzonatate (TESSALON) 100 MG capsule Take 1 capsule (100 mg total) by mouth 3 (three) times daily as needed for Cough. 45 capsule 0    cycloSPORINE (RESTASIS) 0.05 % ophthalmic emulsion Place 0.4 mLs (1 drop total) into both eyes 2 (two) times daily. 60 vial 11    latanoprost (XALATAN) 0.005 % ophthalmic solution Place 1 drop into both eyes once daily. 2.5 mL 11    linaclotide (LINZESS) 145 mcg Cap capsule Take 1 capsule (145 mcg total) by mouth once daily. 30 capsule 5    meloxicam (MOBIC) 15 MG tablet " "TAKE 1 TABLET BY MOUTH EVERY DAY *TAKE WITH FOOD* **MAY CAUSE DROWSINESS** 30 tablet 0    metoprolol tartrate (LOPRESSOR) 25 MG tablet Take 12.5 mg by mouth 2 (two) times daily.  9    multivitamin with minerals (MULTI-VITAMIN W/MINERALS) Cap Take 2 capsules by mouth Daily.      thyroid, pork, (NP THYROID) 90 mg Tab Take 1 tablet by mouth before breakfast.      CONTRAVE 8-90 mg TbSR titation dosing then 1 tablet BY MOUTH TWICE DAILY MAY CAUSE DROWSINESS  2     No current facility-administered medications on file prior to visit.        Past medical, surgical, family and social histories have been reviewed today.        Objective:     Vitals:    01/14/19 0934   BP: 132/80   Pulse: 73   Temp: 98.2 °F (36.8 °C)   TempSrc: Oral   SpO2: 97%   Weight: 72.9 kg (160 lb 11.5 oz)   Height: 5' 3" (1.6 m)   PainSc: 0-No pain         Physical Exam   Constitutional: She is oriented to person, place, and time. She appears well-developed and well-nourished.   HENT:   Head: Normocephalic and atraumatic.   Right Ear: Tympanic membrane and ear canal normal.   Left Ear: Tympanic membrane and ear canal normal.   Nose: Nose normal. No mucosal edema, rhinorrhea or sinus tenderness. No epistaxis. Right sinus exhibits no maxillary sinus tenderness and no frontal sinus tenderness. Left sinus exhibits no maxillary sinus tenderness and no frontal sinus tenderness.   Mouth/Throat: Mucous membranes are normal. No oropharyngeal exudate, posterior oropharyngeal edema or posterior oropharyngeal erythema.   Eyes: Conjunctivae are normal. Pupils are equal, round, and reactive to light.   Cardiovascular: Normal rate and regular rhythm.   Pulmonary/Chest: Effort normal and breath sounds normal.   Lymphadenopathy:     She has cervical adenopathy.   Neurological: She is alert and oriented to person, place, and time.   Vitals reviewed.        Diagnosis       1. Laryngitis    2. Lymphadenopathy          Assessment/ Plan     Laryngitis  -     predniSONE " (DELTASONE) 20 MG tablet; Take 2 tab daily x 3 days, 1 tab daily x 3 days, then 1/2 tab daily x 2 days.  Dispense: 10 tablet; Refill: 0    Lymphadenopathy  -     predniSONE (DELTASONE) 20 MG tablet; Take 2 tab daily x 3 days, 1 tab daily x 3 days, then 1/2 tab daily x 2 days.  Dispense: 10 tablet; Refill: 0        Symptoms x 1 month, intermittent, likely viral.  Prednisone as directed.  Warm fluids, teas, salt water gargles etc.  Follow-up in clinic as needed.      FAHAD Blackburn  [facility]

## 2019-01-22 ENCOUNTER — LAB VISIT (OUTPATIENT)
Dept: LAB | Facility: HOSPITAL | Age: 64
End: 2019-01-22
Payer: COMMERCIAL

## 2019-01-22 ENCOUNTER — OFFICE VISIT (OUTPATIENT)
Dept: DERMATOLOGY | Facility: CLINIC | Age: 64
End: 2019-01-22
Payer: COMMERCIAL

## 2019-01-22 DIAGNOSIS — L65.8 FEMALE PATTERN HAIR LOSS: ICD-10-CM

## 2019-01-22 DIAGNOSIS — L85.3 XEROSIS CUTIS: ICD-10-CM

## 2019-01-22 DIAGNOSIS — L65.9 HAIR LOSS: ICD-10-CM

## 2019-01-22 DIAGNOSIS — L65.9 HAIR LOSS: Primary | ICD-10-CM

## 2019-01-22 LAB
FERRITIN SERPL-MCNC: 51 NG/ML
IRON SERPL-MCNC: 129 UG/DL
SATURATED IRON: 37 %
TOTAL IRON BINDING CAPACITY: 346 UG/DL
TRANSFERRIN SERPL-MCNC: 234 MG/DL

## 2019-01-22 PROCEDURE — 82728 ASSAY OF FERRITIN: CPT

## 2019-01-22 PROCEDURE — 99999 PR PBB SHADOW E&M-EST. PATIENT-LVL III: CPT | Mod: PBBFAC,,, | Performed by: PHYSICIAN ASSISTANT

## 2019-01-22 PROCEDURE — 83540 ASSAY OF IRON: CPT

## 2019-01-22 PROCEDURE — 36415 COLL VENOUS BLD VENIPUNCTURE: CPT

## 2019-01-22 PROCEDURE — 99202 PR OFFICE/OUTPT VISIT, NEW, LEVL II, 15-29 MIN: ICD-10-PCS | Mod: S$GLB,,, | Performed by: PHYSICIAN ASSISTANT

## 2019-01-22 PROCEDURE — 99999 PR PBB SHADOW E&M-EST. PATIENT-LVL III: ICD-10-PCS | Mod: PBBFAC,,, | Performed by: PHYSICIAN ASSISTANT

## 2019-01-22 PROCEDURE — 99202 OFFICE O/P NEW SF 15 MIN: CPT | Mod: S$GLB,,, | Performed by: PHYSICIAN ASSISTANT

## 2019-01-22 NOTE — PATIENT INSTRUCTIONS
What is female pattern hair loss?  Female pattern hair loss (FPHL) is a distinctive form of hair loss that occurs in women with androgenetic alopecia. Many women are affected by FPHL. In fact, around 40% of women by age 50 show signs of hair loss and less than 45% of women actually reach the age of 80 with a full head of hair.  In FPHL, there is diffuse thinning of hair on the scalp due to increased hair shedding or a reduction in hair volume, or both. It is normal to lose up to  hairs a day. Another condition called chronic telogen effluvium also presents with increased hair shedding and is often confused with FPHL. It is important to differentiate between these conditions as management for both conditions differ.  FPHL presents quite differently from the more easily recognizable male pattern baldness, which usually begins with a receding frontal hairline that progresses to a bald patch on top of the head. It is very uncommon for women to bald following the male pattern unless there is excessive production of androgens in the body.  Varying severity of female pattern hair loss  Grade 1  Grade 2  Grade 3  Grade 4  Grade 5    What causes female pattern hair loss?  FPHL has a strong genetic predisposition. The mode of inheritance is polygenic, indicating that there are many genes that contribute to FPHL, and these genes could be inherited from either parent, or both. Genetic testing to assess risk of balding is currently not recommended, as it is unreliable.  Currently, it is not clear if androgens (male sex hormones) play a role in FPHL, although androgens have a clear role in male pattern baldness. The majority of women with FPHL have normal levels of androgens in their bloodstream. Due to this uncertain relationship, the term FPHL is preferred to female androgenetic alopecia.  The role of oestrogen is uncertain. FPHL is more common after the menopause suggesting oestrogens may be stimulatory for hair  growth. But laboratory experiments have also suggested oestrogens may suppress hair growth.  What is the normal hair growth cycle?  Everyone is born with a fixed number of hair follicles on the scalp that produce hairs throughout life. Hair grows from the base of the follicle at a rate of about one centimetre a month for about three years. This growth phase is called anagen. After anagen, the hair dies (catagen hair) and no longer grows. It sits dormant in the follicle for a three-month phase called telogen. After telogen, the hair follicle undergoes another anagen phase to produce a new hair that grows out of the same follicle. As it grows, the old telogen hair is dislodged or pushed out. This is a cycle that continues throughout life.  Normal hair growth cycle    Image © 1998 [facility] [employer] (with permission)  Hair shedding  Increased hair shedding or telogen effluvium is a feature to FPHL. Women can use the hair shedding guide below to define whether hair shedding is normal or excessive,  To assess hair shedding, women should choose which of the 6 photographs of hair bundles best represents how much hair they shed on an average day.  Doctors can use the hair shedding scale to score hair loss at each patient visit to assess response to treatment. It can also be used in clinical trials to assess new treatments for excess hair shedding.  Hair shedding guide   Baseline shedding scores   1 to 3 Normal    4 Borderline    5 or 6 Excessive    Images courtesy of [employer]  Hair miniaturisation  Unlike other areas of the body, hairs on the scalp to grow in naina of 3-4. In androgenetic alopecia the naina progressively lose hairs. Eventually when all the hairs in the tuft are gone, bald scalp appears between the hairs.  Hair miniaturisation    How long does it take for FPHL to progress?  FPHL can affect women in any age group but it occurs more commonly after menopause. The hair loss process is not constant  and usually occurs in fits and bursts. It is not uncommon to have accelerated phases of hair loss for 3-6 months, followed by periods of stability lasting 6-18 months. Without medication, it tends to progress in severity over the next few decades of life.  What are the effects of female pattern hair loss?  Many studies have shown that hair loss is not merely a cosmetic issue, but it also causes significant psychological distress. Compared to unaffected women, those affected have a more negative body image and are less able to cope with daily functioning. Hair loss can be associated with low self-esteem, depression, introversion, and feelings of unattractiveness. It is especially hard to live in a society that places great value on youthful appearance and attractiveness.  Should I have any hormone tests done?  Blood tests include female and male sex hormone levels as well as thyroid function, as part of the diagnostic workup.  The majority of women affected by FPHL do not have underlying hormonal abnormalities. However a few women with FPHL are found to have excessive levels of androgens. These women tend also to suffer from acne, irregular menses and excessive facial and body hair. These symptoms are characteristic of polycystic ovarian syndrome (PCOS) although the majority of women with PCOS do not experience hair loss. Less often, congenital adrenal hyperplasia may be responsible.  What treatments are available?  Treatments are available for FPHL although there is no cure. It is important to manage expectations when seeking treatment, as the aim is to slow or stop the progression of hair loss rather than to promote hair regrowth. However, some women do experience hair regrowth with treatment. Results are variable and it is not possible to predict who may or may not benefit from treatment.  A [employer] systematic review published in 2012 concluded that minoxidil solution was effective for FPHL. Minoxidil is  available as 2% and 5% solutions; the stronger preparation is more likely to irritate and may cause undesirable hair growth unintentionally on areas other than the scalp.  Hormonal treatment, i.e. oral medications that block the effects of androgens (e.g. spironolactone, cyproterone, finasteride and flutamide) is also often tried.  A combination of low dose oral minoxidil (0.25 mg daily) and spironolactone (25 mg daily) has been shown to significantly improve hair growth, reduce shedding and improve hair density.  Once started, treatment needs to continue for at least six months before the benefits can be assessed, and it is important not to stop treatment without discussing it with your doctor first. Long term treatment is usually necessary to sustain the benefits.  Cosmetic camouflages include coloured hair sprays to cover thinning areas on the scalp, hair bulking fibre powder, and hair wigs. Hair transplantation for FPHL is becoming more popular although not everyone is suitable for this procedure.  Low level laser therapy is of unproven benefit in pattern balding but one device has been approved by the FDA for marketing. Further studies are required to determine the magnitude of the benefit, if any.  Where do I go to seek help?  Your first stop would be to see your general practitioner (GP) who can perform a medical workup to exclude other reasons for hair loss. Your GP can refer you to a dermatologist for further management of FPHL. Sometimes, it may be necessary for your doctor to perform a scalp biopsy to confirm this diagnosis.  It is important to seek reliable information and advice from authoritative sources     Cera VE SA for the knees once daily (after showering).     Also, Cetaphil lotion or cream or Cera Ve are good moisturizers for the general body.

## 2019-01-22 NOTE — PROGRESS NOTES
"History of Present Illness: The patient presents with chief complaint of hair loss.  Location: scalp  Duration: 6 months  Signs/Symptoms: thinning of hair    Prior treatments: Viscal and minoxdil    Subjective:       Patient ID:  Yolis Bourgeois is a 63 y.o. female who presents for   Chief Complaint   Patient presents with    Hair Loss     c/o hair loss x 6 months     History of Present Illness: The patient presents with chief complaint of hair loss.  Location: frontal scalp  Duration: 6 months  Signs/Symptoms: thinning of hair; denies itching, tingling, or dandruff; denies shedding    Prior treatments: Viviscal and minoxdil  Reviewed labs from 12/3/18: TSH 0.030, T4 WNL, Hgb and Hct WNL  Denies FHX of hair loss, but states mom had "thinning" of hair  PMHX: hypothyroidism, followed by Dr. Alston, on NP thyroid; hx of arrythmia on Lopressor 12.5mg bid, followed Dr. Greer    C/o dry patches of knees. +dryness and cracking; admits to not using daily moisturizer; denies itching          Review of Systems   Constitutional: Negative for fever and chills.   Gastrointestinal: Negative for nausea and vomiting.   Skin: Positive for dry skin. Negative for itching, rash, sun sensitivity, daily sunscreen use, activity-related sunscreen use and dry lips.   Hematologic/Lymphatic: Does not bruise/bleed easily.        Objective:    Physical Exam   Constitutional: She appears well-developed and well-nourished. No distress.   Neurological: She is alert and oriented to person, place, and time. She is not disoriented.   Psychiatric: She has a normal mood and affect.   Skin:   Areas Examined (abnormalities noted in diagram):   Scalp / Hair Palpated and Inspected  Head / Face Inspection Performed  Neck Inspection Performed  Chest / Axilla Inspection Performed  Back Inspection Performed  RUE Inspected  LUE Inspection Performed                   Diagram Legend     Erythematous scaling macule/papule c/w actinic keratosis       Vascular papule " c/w angioma      Pigmented verrucoid papule/plaque c/w seborrheic keratosis      Yellow umbilicated papule c/w sebaceous hyperplasia      Irregularly shaped tan macule c/w lentigo     1-2 mm smooth white papules consistent with Milia      Movable subcutaneous cyst with punctum c/w epidermal inclusion cyst      Subcutaneous movable cyst c/w pilar cyst      Firm pink to brown papule c/w dermatofibroma      Pedunculated fleshy papule(s) c/w skin tag(s)      Evenly pigmented macule c/w junctional nevus     Mildly variegated pigmented, slightly irregular-bordered macule c/w mildly atypical nevus      Flesh colored to evenly pigmented papule c/w intradermal nevus       Pink pearly papule/plaque c/w basal cell carcinoma      Erythematous hyperkeratotic cursted plaque c/w SCC      Surgical scar with no sign of skin cancer recurrence      Open and closed comedones      Inflammatory papules and pustules      Verrucoid papule consistent consistent with wart     Erythematous eczematous patches and plaques     Dystrophic onycholytic nail with subungual debris c/w onychomycosis     Umbilicated papule    Erythematous-base heme-crusted tan verrucoid plaque consistent with inflamed seborrheic keratosis     Erythematous Silvery Scaling Plaque c/w Psoriasis     See annotation      Assessment / Plan:        Hair loss  Female pattern hair loss  -     Ferritin; Future  -     Iron and TIBC; Future  Suspect pattern hair loss, but can not rule out potential TE vs hair loss secondary to beta blocker. Recommend minoxidil 5% qd. Discussed risk of increased shedding w/n first 6 weeks of tx, and she understands that it may need to be continued indefinitely. Will check above labs for TE. Further recommendations as appropriate.     Xerosis cutis  Ddx: changes due to friction  Discussed gentle skin care. Trial of Cera Ve SA for knee. Advised Cera Ve cream for body.  Will monitor for worsening.           Follow-up in about 3 months (around  4/22/2019) for hair loss.

## 2019-02-21 ENCOUNTER — PATIENT MESSAGE (OUTPATIENT)
Dept: FAMILY MEDICINE | Facility: CLINIC | Age: 64
End: 2019-02-21

## 2019-02-22 ENCOUNTER — TELEPHONE (OUTPATIENT)
Dept: FAMILY MEDICINE | Facility: CLINIC | Age: 64
End: 2019-02-22

## 2019-02-25 ENCOUNTER — OFFICE VISIT (OUTPATIENT)
Dept: FAMILY MEDICINE | Facility: CLINIC | Age: 64
End: 2019-02-25
Payer: COMMERCIAL

## 2019-02-25 VITALS
SYSTOLIC BLOOD PRESSURE: 132 MMHG | WEIGHT: 162.69 LBS | HEIGHT: 63 IN | TEMPERATURE: 98 F | HEART RATE: 86 BPM | OXYGEN SATURATION: 100 % | DIASTOLIC BLOOD PRESSURE: 80 MMHG | BODY MASS INDEX: 28.82 KG/M2

## 2019-02-25 DIAGNOSIS — F43.22 ADJUSTMENT DISORDER WITH ANXIOUS MOOD: Primary | ICD-10-CM

## 2019-02-25 PROCEDURE — 99999 PR PBB SHADOW E&M-EST. PATIENT-LVL IV: ICD-10-PCS | Mod: PBBFAC,,, | Performed by: REGISTERED NURSE

## 2019-02-25 PROCEDURE — 99999 PR PBB SHADOW E&M-EST. PATIENT-LVL IV: CPT | Mod: PBBFAC,,, | Performed by: REGISTERED NURSE

## 2019-02-25 PROCEDURE — 99214 PR OFFICE/OUTPT VISIT, EST, LEVL IV, 30-39 MIN: ICD-10-PCS | Mod: S$GLB,,, | Performed by: REGISTERED NURSE

## 2019-02-25 PROCEDURE — 99214 OFFICE O/P EST MOD 30 MIN: CPT | Mod: S$GLB,,, | Performed by: REGISTERED NURSE

## 2019-02-25 RX ORDER — DULOXETIN HYDROCHLORIDE 30 MG/1
30 CAPSULE, DELAYED RELEASE ORAL DAILY
Qty: 30 CAPSULE | Refills: 6 | Status: SHIPPED | OUTPATIENT
Start: 2019-02-25 | End: 2019-07-01

## 2019-02-25 NOTE — PROGRESS NOTES
Subjective:       Patient ID: Yolis Bourgeois is a 63 y.o. female.    Chief Complaint   Patient presents with    Anxiety    Stress       Anxiety   Presents for initial visit. Onset was 1 to 6 months ago. The problem has been gradually worsening. Symptoms include chest pain (tightness), decreased concentration, excessive worry, hyperventilation, irritability (snaps easily), malaise, muscle tension, nervous/anxious behavior, palpitations, panic (panic symptoms but does not lead to a full-blown attack) and shortness of breath. Patient reports no compulsions, confusion, depressed mood, dizziness, dry mouth, feeling of choking, insomnia, obsessions, restlessness or suicidal ideas. Symptoms occur most days. The severity of symptoms is causing significant distress. The symptoms are aggravated by family issues (daughter & kids living in her home//'s pension decreasing significantly//changing over to Medicare with decreased medical coverage//no free-time, has not been able to exercise). The quality of sleep is good. Nighttime awakenings: none.     There is no history of depression, hyperthyroidism (hypo) or suicide attempts. Past treatments include lifestyle changes. The treatment provided no relief. Compliance with prior treatments has been good.       Review of Systems   Constitutional: Positive for activity change (decreased exercise due to lack of free time), fatigue and irritability (snaps easily). Negative for appetite change.   Respiratory: Positive for chest tightness and shortness of breath.    Cardiovascular: Positive for chest pain (tightness) and palpitations.   Gastrointestinal: Negative.    Genitourinary: Negative.    Musculoskeletal: Positive for myalgias.   Neurological: Positive for headaches. Negative for dizziness and light-headedness.   Psychiatric/Behavioral: Positive for decreased concentration. Negative for confusion, dysphoric mood, hallucinations, self-injury, sleep disturbance and suicidal  "ideas. The patient is nervous/anxious. The patient does not have insomnia and is not hyperactive.        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      cycloSPORINE (RESTASIS) 0.05 % ophthalmic emulsion Place 0.4 mLs (1 drop total) into both eyes 2 (two) times daily. 60 vial 11    latanoprost (XALATAN) 0.005 % ophthalmic solution Place 1 drop into both eyes once daily. 2.5 mL 11    linaclotide (LINZESS) 145 mcg Cap capsule Take 1 capsule (145 mcg total) by mouth once daily. 30 capsule 5    meloxicam (MOBIC) 15 MG tablet TAKE 1 TABLET BY MOUTH EVERY DAY *TAKE WITH FOOD* **MAY CAUSE DROWSINESS** 30 tablet 0    metoprolol tartrate (LOPRESSOR) 25 MG tablet Take 12.5 mg by mouth 2 (two) times daily.  9    multivitamin with minerals (MULTI-VITAMIN W/MINERALS) Cap Take 2 capsules by mouth Daily.      thyroid, pork, (NP THYROID) 90 mg Tab Take 1 tablet by mouth before breakfast.       No current facility-administered medications on file prior to visit.        Past medical, surgical, family and social histories have been reviewed today.        Objective:     Vitals:    02/25/19 0925   BP: 132/80   Pulse: 86   Temp: 97.7 °F (36.5 °C)   TempSrc: Oral   SpO2: 100%   Weight: 73.8 kg (162 lb 11.2 oz)   Height: 5' 3" (1.6 m)   PainSc: 0-No pain "         Physical Exam   Constitutional: She appears well-developed and well-nourished.   Psychiatric: Her speech is normal. Judgment normal. Her mood appears anxious. Her affect is not angry, not blunt, not labile and not inappropriate. She is agitated. She is not aggressive, not slowed, not withdrawn, not actively hallucinating and not combative. Thought content is not paranoid and not delusional. Cognition and memory are normal. She does not exhibit a depressed mood. She expresses no homicidal and no suicidal ideation. She is attentive.   Vitals reviewed.        Diagnosis       1. Adjustment disorder with anxious mood          Assessment/ Plan     Adjustment disorder with anxious mood  · Anxiety for the past few months, uncontrolled.  · Medication discussed, as directed.  · Coping skills, exercise, rest/relaxation, yoga, meditation, journalling, support systems, etc.  -     DULoxetine (CYMBALTA) 30 MG capsule; Take 1 capsule (30 mg total) by mouth once daily.  Dispense: 30 capsule; Refill: 6        Recheck in 1 month or sooner if needed.  Follow-up in clinic as needed.        FAHAD Blackburn  [facility]

## 2019-02-28 ENCOUNTER — OFFICE VISIT (OUTPATIENT)
Dept: GASTROENTEROLOGY | Facility: CLINIC | Age: 64
End: 2019-02-28
Payer: COMMERCIAL

## 2019-02-28 VITALS
BODY MASS INDEX: 28.75 KG/M2 | SYSTOLIC BLOOD PRESSURE: 122 MMHG | WEIGHT: 162.25 LBS | HEIGHT: 63 IN | DIASTOLIC BLOOD PRESSURE: 60 MMHG | HEART RATE: 80 BPM

## 2019-02-28 DIAGNOSIS — K59.04 CHRONIC IDIOPATHIC CONSTIPATION: Primary | ICD-10-CM

## 2019-02-28 PROCEDURE — 99213 PR OFFICE/OUTPT VISIT, EST, LEVL III, 20-29 MIN: ICD-10-PCS | Mod: S$GLB,,, | Performed by: NURSE PRACTITIONER

## 2019-02-28 PROCEDURE — 99999 PR PBB SHADOW E&M-EST. PATIENT-LVL III: CPT | Mod: PBBFAC,,, | Performed by: NURSE PRACTITIONER

## 2019-02-28 PROCEDURE — 99999 PR PBB SHADOW E&M-EST. PATIENT-LVL III: ICD-10-PCS | Mod: PBBFAC,,, | Performed by: NURSE PRACTITIONER

## 2019-02-28 PROCEDURE — 99213 OFFICE O/P EST LOW 20 MIN: CPT | Mod: S$GLB,,, | Performed by: NURSE PRACTITIONER

## 2019-02-28 RX ORDER — FINASTERIDE 1 MG/1
1 TABLET, FILM COATED ORAL EVERY OTHER DAY
Refills: 3 | COMMUNITY
Start: 2019-01-28 | End: 2019-03-27 | Stop reason: SDUPTHER

## 2019-02-28 RX ORDER — FINASTERIDE 1 MG/1
1 TABLET, FILM COATED ORAL EVERY OTHER DAY
COMMUNITY
Start: 2019-01-28

## 2019-03-04 NOTE — PROGRESS NOTES
Clinic Follow Up:  [facility] Follow Up Note    Reason for Follow Up:  The encounter diagnosis was Chronic idiopathic constipation.    PCP: Dana Johnson       HPI:  This is a 63 y.o. female here for follow up of the above. She has been doing well on Linzess. Constipation is well controlled. No current GI complaints. No abdominal pain, hematochezia, melena, nausea, vomiting, or weight loss.      Review of Systems   Constitutional: Negative for activity change and appetite change.        As per interval history above   Respiratory: Negative for cough and shortness of breath.    Cardiovascular: Negative for chest pain.   Gastrointestinal: Positive for constipation. Negative for abdominal pain, anal bleeding, blood in stool, diarrhea, nausea, rectal pain and vomiting.   Skin: Negative for color change and rash.       Medical History:  Past Medical History:   Diagnosis Date    Chronic constipation     Depression     Dry eyes     Endometriosis of broad ligament     Hypothyroidism     Macular degeneration     Migraine headache     Osteoarthritis of hand     Primary open angle glaucoma (POAG) of both eyes, mild stage 4/24/2018    Seasonal allergic rhinitis        Surgical History:   Past Surgical History:   Procedure Laterality Date    CERVICAL CONIZATION   W/ LASER      DILATION AND CURETTAGE OF UTERUS      HYSTERECTOMY  2008    complete    LASIK  2007    LIPOMA RESECTION  2010    right thigh    TOTAL ABDOMINAL HYSTERECTOMY W/ BILATERAL SALPINGOOPHORECTOMY  March 2008       Family History:   Family History   Problem Relation Age of Onset    Hypertension Mother     Heart disease Mother     Dementia Mother     Diabetes Father     Hypertension Father     Heart disease Father     Heart failure Father     Glaucoma Maternal Uncle     Amblyopia Neg Hx     Blindness Neg Hx     Cancer Neg Hx     Cataracts Neg Hx     Macular degeneration Neg Hx     Retinal detachment Neg Hx      "Strabismus Neg Hx     Stroke Neg Hx     Thyroid disease Neg Hx        Social History:   Social History     Tobacco Use    Smoking status: Former Smoker     Last attempt to quit: 1983     Years since quittin.1    Smokeless tobacco: Never Used    Tobacco comment: quit 33 years ago    Substance Use Topics    Alcohol use: Yes     Comment: socially (<1/week)    Drug use: No       Allergies:   Review of patient's allergies indicates:   Allergen Reactions    Climara [estradiol]      Glue on climara patch       Home Medications:  Current Outpatient Medications on File Prior to Visit   Medication Sig Dispense Refill    aspirin (ECOTRIN) 81 MG EC tablet Take 81 mg by mouth once daily.      cycloSPORINE (RESTASIS) 0.05 % ophthalmic emulsion Place 0.4 mLs (1 drop total) into both eyes 2 (two) times daily. 60 vial 11    DULoxetine (CYMBALTA) 30 MG capsule Take 1 capsule (30 mg total) by mouth once daily. 30 capsule 6    finasteride (PROPECIA) 1 mg tablet Take 1 mg by mouth once daily.      finasteride (PROPECIA) 1 mg tablet Take 1 mg by mouth every other day.  3    latanoprost (XALATAN) 0.005 % ophthalmic solution Place 1 drop into both eyes once daily. 2.5 mL 11    meloxicam (MOBIC) 15 MG tablet TAKE 1 TABLET BY MOUTH EVERY DAY *TAKE WITH FOOD* **MAY CAUSE DROWSINESS** 30 tablet 0    metoprolol tartrate (LOPRESSOR) 25 MG tablet Take 12.5 mg by mouth 2 (two) times daily.  9    multivitamin with minerals (MULTI-VITAMIN W/MINERALS) Cap Take 2 capsules by mouth Daily.      thyroid, pork, (NP THYROID) 90 mg Tab Take 1 tablet by mouth before breakfast.       No current facility-administered medications on file prior to visit.        /60   Pulse 80   Ht 5' 3" (1.6 m)   Wt 73.6 kg (162 lb 4.1 oz)   BMI 28.74 kg/m²   Body mass index is 28.74 kg/m².  Physical Exam   Constitutional: She is oriented to person, place, and time and well-developed, well-nourished, and in no distress. No distress.   HENT: "   Head: Normocephalic.   Eyes: Conjunctivae are normal. Pupils are equal, round, and reactive to light.   Cardiovascular: Normal rate, regular rhythm and normal heart sounds.   Pulmonary/Chest: Effort normal and breath sounds normal. No respiratory distress.   Abdominal: Soft. Bowel sounds are normal. She exhibits no distension. There is no tenderness.   Neurological: She is alert and oriented to person, place, and time. No cranial nerve deficit.   Skin: Skin is warm and dry. No rash noted.   Psychiatric: Mood and affect normal.       Labs: Pertinent labs reviewed.    Assessment:  1. Chronic idiopathic constipation        Recommendations:  -     linaclotide (LINZESS) 145 mcg Cap capsule; Take 1 capsule (145 mcg total) by mouth once daily.  Dispense: 30 capsule; Refill: 11    Return to Clinic:  Follow-up in about 1 year (around 2/28/2020), or if symptoms worsen or fail to improve.    Thank you for the opportunity to participate in the care of this patient.  MIGUEL Jeronimo

## 2019-03-18 ENCOUNTER — OFFICE VISIT (OUTPATIENT)
Dept: OPHTHALMOLOGY | Facility: CLINIC | Age: 64
End: 2019-03-18
Payer: COMMERCIAL

## 2019-03-18 DIAGNOSIS — H40.1131 PRIMARY OPEN ANGLE GLAUCOMA (POAG) OF BOTH EYES, MILD STAGE: Primary | ICD-10-CM

## 2019-03-18 PROCEDURE — 99999 PR PBB SHADOW E&M-EST. PATIENT-LVL II: ICD-10-PCS | Mod: PBBFAC,,, | Performed by: OPHTHALMOLOGY

## 2019-03-18 PROCEDURE — 92083 HUMPHREY VISUAL FIELD - OU - BOTH EYES: ICD-10-PCS | Mod: S$GLB,,, | Performed by: OPHTHALMOLOGY

## 2019-03-18 PROCEDURE — 92012 INTRM OPH EXAM EST PATIENT: CPT | Mod: S$GLB,,, | Performed by: OPHTHALMOLOGY

## 2019-03-18 PROCEDURE — 99999 PR PBB SHADOW E&M-EST. PATIENT-LVL II: CPT | Mod: PBBFAC,,, | Performed by: OPHTHALMOLOGY

## 2019-03-18 PROCEDURE — 92012 PR EYE EXAM, EST PATIENT,INTERMED: ICD-10-PCS | Mod: S$GLB,,, | Performed by: OPHTHALMOLOGY

## 2019-03-18 PROCEDURE — 92083 EXTENDED VISUAL FIELD XM: CPT | Mod: S$GLB,,, | Performed by: OPHTHALMOLOGY

## 2019-03-18 PROCEDURE — 92133 POSTERIOR SEGMENT OCT OPTIC NERVE(OCULAR COHERENCE TOMOGRAPHY) - OU - BOTH EYES: ICD-10-PCS | Mod: S$GLB,,, | Performed by: OPHTHALMOLOGY

## 2019-03-18 PROCEDURE — 92133 CPTRZD OPH DX IMG PST SGM ON: CPT | Mod: S$GLB,,, | Performed by: OPHTHALMOLOGY

## 2019-03-18 NOTE — PROGRESS NOTES
===============================  03/18/2019   Yolis Bourgeois,   63 y.o. female   Last visit [facility]: :10/30/2018   Last visit eye dept. Visit date not found  [facility]:  Uncorrected distance visual acuity was 20/25 in the right eye and 20/40 in the left eye.  Tonometry     Tonometry (Applanation, 10:37 AM)       Right Left    Pressure 20 17               Not recorded         Not recorded        Chief Complaint   Patient presents with    Glaucoma     REV HVF/ 4 MONTH IOP CHECK. pt states no problems since her last visit. using her drops as directed.     CORNEAL EPITHELIAL DEFECT     Ophthalmic Medications     Ophthalmic-Intraocular Pressure Reducing Agents, Prostaglandin Analogs Start End    latanoprost (XALATAN) 0.005 % ophthalmic solution 4/24/2018 4/24/2019    Sig: Place 1 drop into both eyes once daily.    Route: Both Eyes         HPI     Glaucoma      Additional comments: REV HVF/ 4 MONTH IOP CHECK. pt states no problems   since her last visit. using her drops as directed.               Comments     1. Anatomic variant mac degen  2. Cobblestone od  3. Dry eyes  Punctal plugs ou  4. H/O Lasik  5. H/O Iritis os  HVF 3/18/19        Restasis BID OU  Naphcon A QAM OU  Systane Wipes  Latanoprost OU qhs  Oasys tears          Last edited by Brad Mendez on 3/18/2019 10:05 AM. (History)          ________________  3/18/2019  Problem List Items Addressed This Visit        Eye/Vision problems    Primary open angle glaucoma (POAG) of both eyes, mild stage - Primary    Relevant Orders    Sung Visual Field - OU - Extended - Both Eyes (Completed)    Posterior Segment OCT Optic Nerve- Both eyes (Completed)        Oct ok  Vf today poor reliability - no obvious irregularities   Coag on xal   CCT + 4  IOP OD higher than I would like but recent IM steroid  rtc 3-4 months for IOP check  She has been less compliant with drops secondary to  illness - hospital    .       ===========================

## 2019-03-20 ENCOUNTER — PATIENT MESSAGE (OUTPATIENT)
Dept: FAMILY MEDICINE | Facility: CLINIC | Age: 64
End: 2019-03-20

## 2019-03-22 ENCOUNTER — TELEPHONE (OUTPATIENT)
Dept: FAMILY MEDICINE | Facility: CLINIC | Age: 64
End: 2019-03-22

## 2019-03-22 NOTE — TELEPHONE ENCOUNTER
----- Message from Moon Zavala sent at 3/22/2019  8:08 AM CDT -----  Contact: self  Pt is calling to let nurse know she had to cancel appt today due to pt's  having to have surgery. Please call pt back at [phone].      Thanks,   Moon Zavala

## 2019-03-27 ENCOUNTER — HOSPITAL ENCOUNTER (OUTPATIENT)
Dept: RADIOLOGY | Facility: HOSPITAL | Age: 64
Discharge: HOME | End: 2019-03-27
Attending: REGISTERED NURSE
Payer: COMMERCIAL

## 2019-03-27 ENCOUNTER — OFFICE VISIT (OUTPATIENT)
Dept: FAMILY MEDICINE | Facility: CLINIC | Age: 64
End: 2019-03-27
Payer: COMMERCIAL

## 2019-03-27 ENCOUNTER — PATIENT MESSAGE (OUTPATIENT)
Dept: FAMILY MEDICINE | Facility: CLINIC | Age: 64
End: 2019-03-27

## 2019-03-27 VITALS
WEIGHT: 164.88 LBS | OXYGEN SATURATION: 98 % | BODY MASS INDEX: 29.21 KG/M2 | DIASTOLIC BLOOD PRESSURE: 89 MMHG | SYSTOLIC BLOOD PRESSURE: 148 MMHG | TEMPERATURE: 98 F | HEART RATE: 73 BPM | HEIGHT: 63 IN

## 2019-03-27 DIAGNOSIS — F43.22 ADJUSTMENT DISORDER WITH ANXIOUS MOOD: ICD-10-CM

## 2019-03-27 DIAGNOSIS — S69.91XA INJURY OF FINGER OF RIGHT HAND, INITIAL ENCOUNTER: Primary | ICD-10-CM

## 2019-03-27 DIAGNOSIS — S69.91XA INJURY OF FINGER OF RIGHT HAND, INITIAL ENCOUNTER: ICD-10-CM

## 2019-03-27 PROCEDURE — 99214 OFFICE O/P EST MOD 30 MIN: CPT | Mod: S$GLB,,, | Performed by: REGISTERED NURSE

## 2019-03-27 PROCEDURE — 99214 PR OFFICE/OUTPT VISIT, EST, LEVL IV, 30-39 MIN: ICD-10-PCS | Mod: S$GLB,,, | Performed by: REGISTERED NURSE

## 2019-03-27 PROCEDURE — 99999 PR PBB SHADOW E&M-EST. PATIENT-LVL IV: CPT | Mod: PBBFAC,,, | Performed by: REGISTERED NURSE

## 2019-03-27 PROCEDURE — 3008F PR BODY MASS INDEX (BMI) DOCUMENTED: ICD-10-PCS | Mod: CPTII,S$GLB,, | Performed by: REGISTERED NURSE

## 2019-03-27 PROCEDURE — 3008F BODY MASS INDEX DOCD: CPT | Mod: CPTII,S$GLB,, | Performed by: REGISTERED NURSE

## 2019-03-27 PROCEDURE — 99999 PR PBB SHADOW E&M-EST. PATIENT-LVL IV: ICD-10-PCS | Mod: PBBFAC,,, | Performed by: REGISTERED NURSE

## 2019-03-28 NOTE — PROGRESS NOTES
Subjective:       Patient ID: Yolis Bourgeois is a 63 y.o. female.    Chief Complaint   Patient presents with    1 month anxiety follow up    Finger Injury       Anxiety   Presents for follow-up visit. Symptoms include decreased concentration, excessive worry, insomnia (fluctuates) and nervous/anxious behavior. Patient reports no compulsions, confusion, depressed mood, feeling of choking, hyperventilation, irritability, malaise, muscle tension, obsessions, panic, restlessness or suicidal ideas. Episode frequency: Was doing fine on medication until  admitted to hospital///he has been in hosp x 3 weeks. The severity of symptoms is moderate. Nighttime awakenings: occasional.     Compliance with medications is %.       Also with c/o RT pinky finger injury on 3/20/2019.  Was walking her dog on a leash, finger got caught when she tried to hold him back when he attempted to tato another dog.  Seen at [facility] --- she was told her finger xray showed a possible bone chip or crack.  Does full ROM, swelling resolved.  No bruising.  She has been keeping it splinted.        Review of Systems   Constitutional: Negative.  Negative for irritability.   Respiratory: Negative.    Cardiovascular: Negative.    Musculoskeletal: Positive for arthralgias and joint swelling.   Skin: Negative.    Psychiatric/Behavioral: Positive for agitation, decreased concentration and dysphoric mood. Negative for confusion, hallucinations, self-injury, sleep disturbance and suicidal ideas. The patient is nervous/anxious and has insomnia (fluctuates). The patient is not hyperactive.        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      cycloSPORINE (RESTASIS) 0.05 % ophthalmic emulsion Place 0.4 mLs (1 drop total) into both eyes 2 (two) times daily. 60 vial 11    DULoxetine (CYMBALTA) 30 MG capsule Take 1 capsule (30 mg total) by mouth once daily. 30 capsule 6    finasteride (PROPECIA) 1 mg tablet Take 1 mg by mouth every other day.       latanoprost (XALATAN) 0.005 % ophthalmic solution Place 1 drop into both eyes once daily. 2.5 mL 11    linaclotide (LINZESS) 145 mcg Cap capsule Take 1 capsule (145 mcg total) by mouth once daily. 30 capsule 11    meloxicam (MOBIC) 15 MG tablet TAKE 1 TABLET BY MOUTH EVERY DAY TAKE WITH FOOD MAY CAUSE DROWSINESS 30 tablet 0    metoprolol tartrate (LOPRESSOR) 25 MG tablet Take 12.5 mg by mouth 2 (two) times daily.  9    multivitamin with minerals (MULTI-VITAMIN W/MINERALS) Cap Take 2 capsules by mouth Daily.      thyroid, pork, (NP THYROID) 90 mg Tab Take 1 tablet by mouth before breakfast.       No current facility-administered medications on file prior to visit.        Past medical, surgical, family and social histories have been reviewed today.        Objective:     Vitals:    03/27/19 0959   BP: (!) 148/89   Pulse: 73   Temp: 98 °F (36.7 °C)   TempSrc: Oral   SpO2: 98%   Weight: 74.8 kg (164 lb 14.5 oz)   Height: 5' 3" (1.6 m)   PainSc: 0-No pain         Physical Exam   Constitutional: She appears well-developed and well-nourished. No distress.   Musculoskeletal:        Right hand: She exhibits tenderness (minimal) and swelling (slight swelling as noted on diagram). She exhibits normal range of motion, normal capillary refill, no " deformity and no laceration. Normal sensation noted. Normal strength noted.        Hands:  Skin: She is not diaphoretic.   Psychiatric: Judgment normal. Her mood appears anxious. Her affect is not angry, not blunt, not labile and not inappropriate. Her speech is not rapid and/or pressured, not delayed, not tangential and not slurred. She is agitated. She is not aggressive, not hyperactive, not slowed, not withdrawn, not actively hallucinating and not combative. Thought content is not delusional. Cognition and memory are normal. She does not exhibit a depressed mood. She expresses no suicidal plans and no homicidal plans. She is communicative. She is attentive.   Vitals reviewed.        Diagnosis       1. Injury of finger of right hand, initial encounter    2. Adjustment disorder with anxious mood          Assessment/ Plan     Injury of finger of right hand, initial encounter  · Finger care discussed.  · To re-xray in 2 weeks.  -     X-Ray Finger 2 or More Views; Future; Expected date: 03/27/2019    Adjustment disorder with anxious mood  · PT with reports of good results w/ medication until  fell ill.  · Maintain coping skills, relaxation, support systems, etc.  · Continue Cymbalta 30 mg daily as ordered for now --- increase to 2 caps if needed.      Follow-up in 3 months for anxiety recheck, or sooner if needed.        FAHAD Blackburn  [facility]

## 2019-04-10 ENCOUNTER — OFFICE VISIT (OUTPATIENT)
Dept: FAMILY MEDICINE | Facility: CLINIC | Age: 64
End: 2019-04-10
Payer: COMMERCIAL

## 2019-04-10 ENCOUNTER — HOSPITAL ENCOUNTER (OUTPATIENT)
Dept: RADIOLOGY | Facility: HOSPITAL | Age: 64
Discharge: HOME | End: 2019-04-10
Attending: REGISTERED NURSE
Payer: COMMERCIAL

## 2019-04-10 VITALS
DIASTOLIC BLOOD PRESSURE: 68 MMHG | WEIGHT: 165.38 LBS | TEMPERATURE: 98 F | HEIGHT: 63 IN | OXYGEN SATURATION: 95 % | HEART RATE: 83 BPM | BODY MASS INDEX: 29.3 KG/M2 | SYSTOLIC BLOOD PRESSURE: 120 MMHG

## 2019-04-10 DIAGNOSIS — S69.91XA INJURY OF FINGER OF RIGHT HAND, INITIAL ENCOUNTER: ICD-10-CM

## 2019-04-10 DIAGNOSIS — J32.9 SINUSITIS, UNSPECIFIED CHRONICITY, UNSPECIFIED LOCATION: Primary | ICD-10-CM

## 2019-04-10 PROCEDURE — 73140 X-RAY EXAM OF FINGER(S): CPT | Mod: 26,RT,, | Performed by: RADIOLOGY

## 2019-04-10 PROCEDURE — 3008F PR BODY MASS INDEX (BMI) DOCUMENTED: ICD-10-PCS | Mod: CPTII,S$GLB,, | Performed by: REGISTERED NURSE

## 2019-04-10 PROCEDURE — 96372 THER/PROPH/DIAG INJ SC/IM: CPT | Mod: S$GLB,,, | Performed by: REGISTERED NURSE

## 2019-04-10 PROCEDURE — 99214 PR OFFICE/OUTPT VISIT, EST, LEVL IV, 30-39 MIN: ICD-10-PCS | Mod: 25,S$GLB,, | Performed by: REGISTERED NURSE

## 2019-04-10 PROCEDURE — 73140 XR FINGER 2 OR MORE VIEWS: ICD-10-PCS | Mod: 26,RT,, | Performed by: RADIOLOGY

## 2019-04-10 PROCEDURE — 96372 PR INJECTION,THERAP/PROPH/DIAG2ST, IM OR SUBCUT: ICD-10-PCS | Mod: S$GLB,,, | Performed by: REGISTERED NURSE

## 2019-04-10 PROCEDURE — 73140 X-RAY EXAM OF FINGER(S): CPT | Mod: TC,FY,PO

## 2019-04-10 PROCEDURE — 99999 PR PBB SHADOW E&M-EST. PATIENT-LVL IV: ICD-10-PCS | Mod: PBBFAC,,, | Performed by: REGISTERED NURSE

## 2019-04-10 PROCEDURE — 99999 PR PBB SHADOW E&M-EST. PATIENT-LVL IV: CPT | Mod: PBBFAC,,, | Performed by: REGISTERED NURSE

## 2019-04-10 PROCEDURE — 99214 OFFICE O/P EST MOD 30 MIN: CPT | Mod: 25,S$GLB,, | Performed by: REGISTERED NURSE

## 2019-04-10 PROCEDURE — 3008F BODY MASS INDEX DOCD: CPT | Mod: CPTII,S$GLB,, | Performed by: REGISTERED NURSE

## 2019-04-10 RX ORDER — METHYLPREDNISOLONE ACETATE 80 MG/ML
80 INJECTION, SUSPENSION INTRA-ARTICULAR; INTRALESIONAL; INTRAMUSCULAR; SOFT TISSUE ONCE
Status: COMPLETED | OUTPATIENT
Start: 2019-04-10 | End: 2019-04-10

## 2019-04-10 RX ORDER — DOXYCYCLINE 100 MG/1
100 CAPSULE ORAL 2 TIMES DAILY
Qty: 20 CAPSULE | Refills: 0 | Status: SHIPPED | OUTPATIENT
Start: 2019-04-10 | End: 2019-04-20

## 2019-04-10 RX ORDER — BENZONATATE 100 MG/1
100-200 CAPSULE ORAL 3 TIMES DAILY PRN
Qty: 60 CAPSULE | Refills: 0 | Status: SHIPPED | OUTPATIENT
Start: 2019-04-10 | End: 2019-07-01

## 2019-04-10 RX ADMIN — METHYLPREDNISOLONE ACETATE 80 MG: 80 INJECTION, SUSPENSION INTRA-ARTICULAR; INTRALESIONAL; INTRAMUSCULAR; SOFT TISSUE at 03:04

## 2019-04-12 ENCOUNTER — PATIENT MESSAGE (OUTPATIENT)
Dept: OPHTHALMOLOGY | Facility: CLINIC | Age: 64
End: 2019-04-12

## 2019-04-15 NOTE — PROGRESS NOTES
Subjective:       Patient ID: Yolis Bourgeois is a 63 y.o. female.    Chief Complaint   Patient presents with    Cough       HPI    Yolis Bourgeois is here today with c/o not feeling well ~ 2 weeks.   has been in hospital recovering from illness.  Reports symptoms worse over past few days, OTC med not helping.  Reports chest tightness, deep heavy dry cough.  Does c/o frontal HA pain, red eyes, ear popping, RN, NC with sore throat.      Review of Systems   Constitutional: Positive for fatigue. Negative for chills and fever.   HENT: Positive for congestion, ear pain, postnasal drip, rhinorrhea, sinus pain and sore throat. Negative for sneezing, tinnitus, trouble swallowing and voice change.    Eyes: Positive for redness. Negative for photophobia, pain, discharge, itching and visual disturbance.   Respiratory: Positive for cough and chest tightness. Negative for shortness of breath and wheezing.    Cardiovascular: Negative.    Gastrointestinal: Negative for diarrhea, nausea and vomiting.   Skin: Negative.    Neurological: Positive for headaches. Negative for weakness, light-headedness and numbness.   Hematological: Negative for adenopathy.       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      cycloSPORINE (RESTASIS) 0.05 % ophthalmic emulsion  "Place 0.4 mLs (1 drop total) into both eyes 2 (two) times daily. 60 vial 11    DULoxetine (CYMBALTA) 30 MG capsule Take 1 capsule (30 mg total) by mouth once daily. 30 capsule 6    finasteride (PROPECIA) 1 mg tablet Take 1 mg by mouth every other day.       latanoprost (XALATAN) 0.005 % ophthalmic solution Place 1 drop into both eyes once daily. 2.5 mL 11    linaclotide (LINZESS) 145 mcg Cap capsule Take 1 capsule (145 mcg total) by mouth once daily. 30 capsule 11    meloxicam (MOBIC) 15 MG tablet TAKE 1 TABLET BY MOUTH EVERY DAY *TAKE WITH FOOD* **MAY CAUSE DROWSINESS** 30 tablet 0    metoprolol tartrate (LOPRESSOR) 25 MG tablet Take 12.5 mg by mouth 2 (two) times daily.  9    multivitamin with minerals (MULTI-VITAMIN W/MINERALS) Cap Take 2 capsules by mouth Daily.      thyroid, pork, (NP THYROID) 90 mg Tab Take 1 tablet by mouth before breakfast.       No current facility-administered medications on file prior to visit.        Past medical, surgical, family and social histories have been reviewed today.        Objective:     Vitals:    04/10/19 1459   BP: 120/68   Pulse: 83   Temp: 97.5 °F (36.4 °C)   SpO2: 95%   Weight: 75 kg (165 lb 5.5 oz)   Height: 5' 3" (1.6 m)   PainSc: 0-No pain         Physical Exam   Constitutional: She is oriented to person, place, and time. She appears well-developed and well-nourished. No distress.   HENT:   Head: Normocephalic and atraumatic.   Right Ear: Tympanic membrane is bulging. Tympanic membrane is not injected, not perforated, not erythematous and not retracted.   Left Ear: Tympanic membrane is bulging. Tympanic membrane is not injected, not perforated, not erythematous and not retracted.   Nose: Mucosal edema and rhinorrhea present. Right sinus exhibits frontal sinus tenderness. Left sinus exhibits frontal sinus tenderness.   Mouth/Throat: No oropharyngeal exudate, posterior oropharyngeal edema or posterior oropharyngeal erythema.   Eyes: Pupils are equal, round, and " reactive to light. Conjunctivae and EOM are normal. Right eye exhibits no discharge. Left eye exhibits no discharge.   Cardiovascular: Normal rate and regular rhythm.   Pulmonary/Chest: Effort normal and breath sounds normal. No respiratory distress. She has no wheezes. She has no rales. She exhibits no tenderness.   Lymphadenopathy:     She has no cervical adenopathy.   Neurological: She is alert and oriented to person, place, and time.   Skin: She is not diaphoretic.   Vitals reviewed.        Diagnosis       1. Sinusitis, unspecified chronicity, unspecified location          Assessment/ Plan     Sinusitis, unspecified chronicity, unspecified location  -     doxycycline (MONODOX) 100 MG capsule; Take 1 capsule (100 mg total) by mouth 2 (two) times daily. for 10 days  Dispense: 20 capsule; Refill: 0  -     benzonatate (TESSALON) 100 MG capsule; Take 1-2 capsules (100-200 mg total) by mouth 3 (three) times daily as needed for Cough.  Dispense: 60 capsule; Refill: 0  -     methylPREDNISolone acetate injection 80 mg        Injection today.  Medication discussed, as directed.  Rest and fluids, Vitamin-C.  Follow-up in clinic as needed.        FAHAD Blackburn  [facility]

## 2019-04-18 ENCOUNTER — TELEPHONE (OUTPATIENT)
Dept: FAMILY MEDICINE | Facility: CLINIC | Age: 64
End: 2019-04-18

## 2019-04-18 NOTE — TELEPHONE ENCOUNTER
Finger xray stable although it shows either a cyst to the area of concern OR an avulsion fracture (occurs when a tendon or ligament that is attached to the bone pulls a piece of the fractured bone off).  Treatment of an avulsion fracture typically includes resting and icing the affected area, followed by exercises that help restore range of motion, improve muscle strength and promote bone healing. Most avulsion fractures heal very well without surgical intervention.  If still having problems, I would either rec evaluation with Ortho//Hand specialist or a trial of PT.

## 2019-04-18 NOTE — TELEPHONE ENCOUNTER
S/W Pt and notified pt of finger Xray results and gave the pt instructions to follow which came from . [facility] Tejada. Pt states that she isn't having any trouble with the finger anymore just a little tenderness. Pt verbalized understanding.//rf

## 2019-04-30 ENCOUNTER — PATIENT MESSAGE (OUTPATIENT)
Dept: FAMILY MEDICINE | Facility: CLINIC | Age: 64
End: 2019-04-30

## 2019-04-30 RX ORDER — BUTALBITAL, ACETAMINOPHEN AND CAFFEINE 50; 325; 40 MG/1; MG/1; MG/1
1 TABLET ORAL EVERY 4 HOURS PRN
Qty: 60 TABLET | Refills: 0 | Status: SHIPPED | OUTPATIENT
Start: 2019-04-30 | End: 2019-09-06 | Stop reason: SDUPTHER

## 2019-05-03 ENCOUNTER — OFFICE VISIT (OUTPATIENT)
Dept: OPHTHALMOLOGY | Facility: CLINIC | Age: 64
End: 2019-05-03
Payer: COMMERCIAL

## 2019-05-03 DIAGNOSIS — H40.1131 PRIMARY OPEN ANGLE GLAUCOMA (POAG) OF BOTH EYES, MILD STAGE: Primary | ICD-10-CM

## 2019-05-03 PROCEDURE — 92012 PR EYE EXAM, EST PATIENT,INTERMED: ICD-10-PCS | Mod: S$GLB,,, | Performed by: OPHTHALMOLOGY

## 2019-05-03 PROCEDURE — 99999 PR PBB SHADOW E&M-EST. PATIENT-LVL II: ICD-10-PCS | Mod: PBBFAC,,, | Performed by: OPHTHALMOLOGY

## 2019-05-03 PROCEDURE — 92012 INTRM OPH EXAM EST PATIENT: CPT | Mod: S$GLB,,, | Performed by: OPHTHALMOLOGY

## 2019-05-03 PROCEDURE — 99999 PR PBB SHADOW E&M-EST. PATIENT-LVL II: CPT | Mod: PBBFAC,,, | Performed by: OPHTHALMOLOGY

## 2019-05-03 NOTE — PROGRESS NOTES
===============================  05/03/2019   Yolis Bourgeois,   63 y.o. female   Last visit [facility]: :3/18/2019   Last visit eye dept. 3/18/2019  [facility]:  Uncorrected distance visual acuity was 20/25 in the right eye and 20/30 in the left eye.  Tonometry     Tonometry (Applanation, 10:44 AM)       Right Left    Pressure 17 15               Not recorded         Not recorded        Chief Complaint   Patient presents with    Glaucoma     Pt had steroid shot recently and wanted to come have her pressure checked.     Ophthalmic Medications     Ophthalmic-Intraocular Pressure Reducing Agents, Prostaglandin Analogs Start End     latanoprost (XALATAN) 0.005 % ophthalmic solution    4/24/2018 4/24/2019    Sig: Place 1 drop into both eyes once daily.    Route: Both Eyes         HPI     Glaucoma      Additional comments: Pt had steroid shot recently and wanted to come   have her pressure checked.              Comments     1. Anatomic variant mac degen  2. Cobblestone od  3. Dry eyes  Punctal plugs ou  4. H/O Lasik  5. H/O Iritis os  HVF 3/18/19        Restasis BID OU  Naphcon A QAM OU  Systane Wipes  Latanoprost OU qhs  Oasys tears          Last edited by Nessa Cheng on 5/3/2019  8:57 AM. (History)          ________________  5/3/2019  Problem List Items Addressed This Visit        Eye/Vision problems    Primary open angle glaucoma (POAG) of both eyes, mild stage - Primary    Overview     --5/9/19 IM Steroid 3 weeks ago  Good IOP, no steroid response             T 17 15   im stropid 3 weeks ago   good!  rtc  4 [id number]      .       ===========================

## 2019-05-16 ENCOUNTER — TELEPHONE (OUTPATIENT)
Dept: RADIOLOGY | Facility: HOSPITAL | Age: 64
End: 2019-05-16

## 2019-05-17 ENCOUNTER — HOSPITAL ENCOUNTER (OUTPATIENT)
Dept: RADIOLOGY | Facility: HOSPITAL | Age: 64
Discharge: HOME | End: 2019-05-17
Attending: UROLOGY
Payer: COMMERCIAL

## 2019-05-17 DIAGNOSIS — N28.1 RENAL CYST: ICD-10-CM

## 2019-05-17 PROCEDURE — 76770 US EXAM ABDO BACK WALL COMP: CPT | Mod: 26,,, | Performed by: RADIOLOGY

## 2019-05-17 PROCEDURE — 76770 US EXAM ABDO BACK WALL COMP: CPT | Mod: TC

## 2019-05-17 PROCEDURE — 76770 US RETROPERITONEAL COMPLETE: ICD-10-PCS | Mod: 26,,, | Performed by: RADIOLOGY

## 2019-05-20 ENCOUNTER — PATIENT MESSAGE (OUTPATIENT)
Dept: FAMILY MEDICINE | Facility: CLINIC | Age: 64
End: 2019-05-20

## 2019-05-20 ENCOUNTER — OFFICE VISIT (OUTPATIENT)
Dept: UROLOGY | Facility: CLINIC | Age: 64
End: 2019-05-20
Payer: COMMERCIAL

## 2019-05-20 VITALS
WEIGHT: 162.69 LBS | DIASTOLIC BLOOD PRESSURE: 78 MMHG | SYSTOLIC BLOOD PRESSURE: 134 MMHG | BODY MASS INDEX: 28.82 KG/M2 | HEIGHT: 63 IN | HEART RATE: 65 BPM

## 2019-05-20 DIAGNOSIS — N39.3 SUI (STRESS URINARY INCONTINENCE, FEMALE): ICD-10-CM

## 2019-05-20 DIAGNOSIS — N28.1 RENAL CYST: Primary | ICD-10-CM

## 2019-05-20 LAB
BILIRUB SERPL-MCNC: NORMAL MG/DL
BLOOD URINE, POC: NORMAL
COLOR, POC UA: YELLOW
GLUCOSE UR QL STRIP: NORMAL
KETONES UR QL STRIP: NORMAL
LEUKOCYTE ESTERASE URINE, POC: NORMAL
NITRITE, POC UA: NORMAL
PH, POC UA: 5
PROTEIN, POC: NORMAL
SPECIFIC GRAVITY, POC UA: 1
UROBILINOGEN, POC UA: NORMAL

## 2019-05-20 PROCEDURE — 81002 POCT URINE DIPSTICK WITHOUT MICROSCOPE: ICD-10-PCS | Mod: S$GLB,,, | Performed by: UROLOGY

## 2019-05-20 PROCEDURE — 81002 URINALYSIS NONAUTO W/O SCOPE: CPT | Mod: S$GLB,,, | Performed by: UROLOGY

## 2019-05-20 PROCEDURE — 3008F PR BODY MASS INDEX (BMI) DOCUMENTED: ICD-10-PCS | Mod: CPTII,S$GLB,, | Performed by: UROLOGY

## 2019-05-20 PROCEDURE — 99214 PR OFFICE/OUTPT VISIT, EST, LEVL IV, 30-39 MIN: ICD-10-PCS | Mod: 25,S$GLB,, | Performed by: UROLOGY

## 2019-05-20 PROCEDURE — 99999 PR PBB SHADOW E&M-EST. PATIENT-LVL III: ICD-10-PCS | Mod: PBBFAC,,, | Performed by: UROLOGY

## 2019-05-20 PROCEDURE — 99999 PR PBB SHADOW E&M-EST. PATIENT-LVL III: CPT | Mod: PBBFAC,,, | Performed by: UROLOGY

## 2019-05-20 PROCEDURE — 99214 OFFICE O/P EST MOD 30 MIN: CPT | Mod: 25,S$GLB,, | Performed by: UROLOGY

## 2019-05-20 PROCEDURE — 3008F BODY MASS INDEX DOCD: CPT | Mod: CPTII,S$GLB,, | Performed by: UROLOGY

## 2019-05-20 RX ORDER — MELOXICAM 15 MG/1
TABLET ORAL
Qty: 30 TABLET | Refills: 0 | Status: SHIPPED | OUTPATIENT
Start: 2019-05-20 | End: 2019-09-06 | Stop reason: SDUPTHER

## 2019-05-20 NOTE — PROGRESS NOTES
Chief Complaint: Renal cysts    HPI:   5/20/19: Reviewed history in detail. Feeling fine, no problems.  Cyst same as before smaller if anything.  Some DORY not needing a pad.  5/17/18: No problems in the interval. Bilateral cysts same.  5/11/17: 60 yo woman followed for 4 years for renal cysts.  Had a pyelonephritis on the way.  Has a 2.5 cm right Adal II cyst that looks stable over many years.    Allergies:  Climara [estradiol]    Medications: has a current medication list which includes the following prescription(s): aspirin, benzonatate, butalbital-acetaminophen-caffeine -40 mg, cyclosporine, duloxetine, finasteride, latanoprost, linaclotide, meloxicam, metoprolol tartrate, multivitamin with minerals, and thyroid (pork).    Review of Systems:  General: No fever, chills, fatigability, or weight loss.  Skin: No rashes, itching, or changes in color or texture of skin.  Chest: Denies NIEVES, cyanosis, wheezing, cough, and sputum production.  Abdomen: Appetite fine. No weight loss. Denies diarrhea, abdominal pain, hematemesis, or blood in stool.  Musculoskeletal: No joint stiffness or swelling. Denies back pain.  : As above.  All other review of systems negative.    PMH:   has a past medical history of Chronic constipation, Depression, Dry eyes, Endometriosis of broad ligament, Hypothyroidism, Macular degeneration, Migraine headache, Osteoarthritis of hand, Primary open angle glaucoma (POAG) of both eyes, mild stage (4/24/2018), and Seasonal allergic rhinitis.    PSH:   has a past surgical history that includes Lipoma resection (2010); Total abdominal hysterectomy w/ bilateral salpingoophorectomy (March 2008); Cervical conization w/ laser; Dilation and curettage of uterus; LASIK (2007); and Hysterectomy (2008).    FamHx: family history includes Dementia in her mother; Diabetes in her father; Glaucoma in her maternal uncle; Heart disease in her father and mother; Heart failure in her father; Hypertension in her  father and mother.    SocHx:  reports that she quit smoking about 36 years ago. She has never used smokeless tobacco. She reports that she drinks alcohol. She reports that she does not use drugs.     Physical Exam:  Vitals:   Vitals:    05/20/19 1332   BP: 134/78   Pulse: 65     General: A&Ox3. No apparent distress. No deformities.  Neck: No masses. Normal thyroid.  Lungs: normal inspiration. No use of accessory muscles.  Heart: normal pulse. No arrhythmias.  Abdomen: Soft. NT. ND  Skin: The skin is warm and dry. No jaundice.  Ext: No c/c/e.  : deferred    Labs/Studies:     Impression/Plan:   1. Unlikely this renal cyst will ever be a problem.  But after discussion again agreed to the reassurance of annual US.  2. PFMT at pt request when ready.

## 2019-05-29 RX ORDER — LATANOPROST 50 UG/ML
1 SOLUTION/ DROPS OPHTHALMIC DAILY
Qty: 2.5 ML | Refills: 1 | Status: SHIPPED | OUTPATIENT
Start: 2019-05-29 | End: 2019-07-01

## 2019-05-29 RX ORDER — LATANOPROST 50 UG/ML
1 SOLUTION/ DROPS OPHTHALMIC DAILY
Qty: 2.5 ML | Refills: 11 | Status: SHIPPED | OUTPATIENT
Start: 2019-05-29 | End: 2020-06-23

## 2019-06-20 ENCOUNTER — PATIENT MESSAGE (OUTPATIENT)
Dept: FAMILY MEDICINE | Facility: CLINIC | Age: 64
End: 2019-06-20

## 2019-06-25 ENCOUNTER — PATIENT MESSAGE (OUTPATIENT)
Dept: FAMILY MEDICINE | Facility: CLINIC | Age: 64
End: 2019-06-25

## 2019-07-01 ENCOUNTER — OFFICE VISIT (OUTPATIENT)
Dept: FAMILY MEDICINE | Facility: CLINIC | Age: 64
End: 2019-07-01
Payer: COMMERCIAL

## 2019-07-01 ENCOUNTER — OFFICE VISIT (OUTPATIENT)
Dept: OPHTHALMOLOGY | Facility: CLINIC | Age: 64
End: 2019-07-01
Payer: COMMERCIAL

## 2019-07-01 VITALS
HEIGHT: 63 IN | WEIGHT: 165.38 LBS | BODY MASS INDEX: 29.3 KG/M2 | TEMPERATURE: 98 F | DIASTOLIC BLOOD PRESSURE: 83 MMHG | HEART RATE: 76 BPM | SYSTOLIC BLOOD PRESSURE: 134 MMHG | OXYGEN SATURATION: 97 %

## 2019-07-01 DIAGNOSIS — H40.1131 PRIMARY OPEN ANGLE GLAUCOMA (POAG) OF BOTH EYES, MILD STAGE: Primary | ICD-10-CM

## 2019-07-01 DIAGNOSIS — I48.0 PAROXYSMAL ATRIAL FIBRILLATION: ICD-10-CM

## 2019-07-01 DIAGNOSIS — F43.22 ADJUSTMENT DISORDER WITH ANXIOUS MOOD: Primary | ICD-10-CM

## 2019-07-01 PROCEDURE — 99999 PR PBB SHADOW E&M-EST. PATIENT-LVL II: ICD-10-PCS | Mod: PBBFAC,,, | Performed by: OPHTHALMOLOGY

## 2019-07-01 PROCEDURE — 99999 PR PBB SHADOW E&M-EST. PATIENT-LVL IV: ICD-10-PCS | Mod: PBBFAC,,, | Performed by: REGISTERED NURSE

## 2019-07-01 PROCEDURE — 3008F BODY MASS INDEX DOCD: CPT | Mod: CPTII,S$GLB,, | Performed by: REGISTERED NURSE

## 2019-07-01 PROCEDURE — 99214 OFFICE O/P EST MOD 30 MIN: CPT | Mod: S$GLB,,, | Performed by: REGISTERED NURSE

## 2019-07-01 PROCEDURE — 99214 PR OFFICE/OUTPT VISIT, EST, LEVL IV, 30-39 MIN: ICD-10-PCS | Mod: S$GLB,,, | Performed by: REGISTERED NURSE

## 2019-07-01 PROCEDURE — 99999 PR PBB SHADOW E&M-EST. PATIENT-LVL II: CPT | Mod: PBBFAC,,, | Performed by: OPHTHALMOLOGY

## 2019-07-01 PROCEDURE — 92012 INTRM OPH EXAM EST PATIENT: CPT | Mod: S$GLB,,, | Performed by: OPHTHALMOLOGY

## 2019-07-01 PROCEDURE — 99999 PR PBB SHADOW E&M-EST. PATIENT-LVL IV: CPT | Mod: PBBFAC,,, | Performed by: REGISTERED NURSE

## 2019-07-01 PROCEDURE — 3008F PR BODY MASS INDEX (BMI) DOCUMENTED: ICD-10-PCS | Mod: CPTII,S$GLB,, | Performed by: REGISTERED NURSE

## 2019-07-01 PROCEDURE — 92012 PR EYE EXAM, EST PATIENT,INTERMED: ICD-10-PCS | Mod: S$GLB,,, | Performed by: OPHTHALMOLOGY

## 2019-07-01 RX ORDER — DULOXETIN HYDROCHLORIDE 60 MG/1
60 CAPSULE, DELAYED RELEASE ORAL DAILY
Qty: 30 CAPSULE | Refills: 6 | Status: SHIPPED | OUTPATIENT
Start: 2019-07-01 | End: 2020-01-06

## 2019-07-01 NOTE — PROGRESS NOTES
===============================  07/01/2019   Yolis Bourgeois,   63 y.o. female   Last visit [facility]: :5/3/2019   Last visit eye dept. 5/3/2019  [facility]:  Uncorrected distance visual acuity was 20/30 in the right eye and 20/40 in the left eye.  Tonometry     Tonometry (Applanation, 10:09 AM)       Right Left    Pressure 14 14               Not recorded         Not recorded        Chief Complaint   Patient presents with    Glaucoma     2 month iop check     Ophthalmic Medications     Ophthalmic-Intraocular Pressure Reducing Agents, Prostaglandin Analogs Start End     latanoprost (XALATAN) 0.005 % ophthalmic solution    5/29/2019 6/23/2020    Sig: Place 1 drop into both eyes once daily.    Route: Both Eyes     latanoprost (XALATAN) 0.005 % ophthalmic solution (Discontinued)    5/29/2019 7/1/2019    Sig: Place 1 drop into both eyes once daily.    Route: Both Eyes         HPI     Glaucoma      Additional comments: 2 month iop check              Comments     1. Anatomic variant mac degen  2. Cobblestone od  3. Dry eyes  Punctal plugs ou  4. H/O Lasik  5. H/O Iritis os  HVF 3/18/19        Restasis BID OU  Naphcon A QAM OU  Systane Wipes  Latanoprost OU qhs  Oasys tears          Last edited by Nessa Cheng on 7/1/2019  9:44 AM. (History)          ________________  7/1/2019  Problem List Items Addressed This Visit        Eye/Vision problems    Primary open angle glaucoma (POAG) of both eyes, mild stage - Primary          .good IOP  Continue same drops  rtc 3-4 months iop check       ===========================

## 2019-07-01 NOTE — PROGRESS NOTES
"Subjective:       Patient ID: Yolis Bourgeois is a 63 y.o. female.    Chief Complaint   Patient presents with    Follow-up       HPI    Yolis Bourgeois is here today to f/u on anxiety.  Last seen in office on 3/27/2019, chart note reviewed today.  She has continued with Cymbalta 30 mg daily as ordered, helps somewhat.  Trying to manage and deal w/  chronic illness, has had long hospitalization and recovery.  States "I don't seen an end in sight".  Extremely exhausted, overwhelmed.  Does not take time for herself, now tending to isolate from others.  She is not sleeping well, good and bad nights.  She tends "to keep one ear open for her  in case he needs me".  Does not ever have deep sleep with feeling of being refreshed.  Denies SI/HI or self-harm.      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 Negative for appetite change.   Respiratory: Negative for chest tightness and shortness of breath.    Cardiovascular: Negative.    Neurological: Negative for weakness, light-headedness, numbness and headaches.   Psychiatric/Behavioral: Positive for agitation, decreased concentration, dysphoric mood and sleep disturbance. Negative for confusion, hallucinations, self-injury and suicidal ideas. The patient is nervous/anxious. The patient is not hyperactive.        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      benzonatate (TESSALON) 100 MG capsule Take 1-2 capsules (100-200 mg total) by mouth 3 (three) times daily as needed for Cough. 60 capsule 0    DULoxetine (CYMBALTA) 30 MG capsule Take 1 capsule (30 mg total) by mouth once daily. 30 capsule 6    finasteride (PROPECIA) 1 mg tablet Take 1 mg by mouth every other day.       latanoprost (XALATAN) 0.005 % ophthalmic solution Place 1 drop into both eyes once daily. 2.5 mL 11    latanoprost (XALATAN) 0.005 % ophthalmic solution Place 1 drop into both eyes once daily. 2.5 mL 1    linaclotide (LINZESS) 145 mcg Cap capsule Take 1 capsule (145 mcg total) by mouth once daily. 30 capsule 11    meloxicam (MOBIC) 15 MG tablet TAKE 1 TABLET BY MOUTH EVERY DAY TAKE WITH FOOD MAY CAUSE DROWSINESS 30 tablet 0    metoprolol tartrate (LOPRESSOR) 25 MG tablet Take 12.5 mg by mouth 2 (two) times daily.  9    multivitamin with minerals (MULTI-VITAMIN W/MINERALS) Cap Take 2 capsules by mouth Daily.      thyroid, pork, (NP THYROID) 90 mg Tab Take 1 tablet by mouth before breakfast.      cycloSPORINE (RESTASIS) 0.05 % ophthalmic emulsion Place 0.4 mLs (1 drop total) into both eyes 2 (two) times daily. 60 vial 11     No current facility-administered medications on file prior to visit.          Past medical, surgical, family and social histories have been reviewed today.        Objective:     Vitals:    07/01/19 0805   BP: 134/83   Pulse: 76   Temp: 98.3 °F (36.8 °C)   SpO2: 97%   Weight: 75 kg (165 lb 5.5 oz)   Height: 5' 3" (1.6 m)   PainSc: 0-No pain         Physical Exam   Constitutional: She is oriented to person, place, and time. She appears well-developed and well-nourished.   Cardiovascular: Normal rate and regular rhythm.   Pulmonary/Chest: Effort normal and breath sounds normal.   Neurological: She is alert " and oriented to person, place, and time.   Psychiatric: Her speech is normal. Judgment normal. Her mood appears anxious. Her affect is not blunt and not labile. She is agitated. She is not slowed, not withdrawn and not actively hallucinating. Thought content is not paranoid and not delusional. Cognition and memory are normal. She expresses no suicidal plans and no homicidal plans. She is attentive.   Vitals reviewed.        Diagnosis       1. Adjustment disorder with anxious mood    2. Paroxysmal atrial fibrillation          Assessment/ Plan     Adjustment disorder with anxious mood  · Uncontrolled at this time.  · Increased Cymbalta to 60 mg daily.  · Counseling if needed.  · Coping skills, relaxation, support system, exercise, etc.  · Take time for self in order to more effectively take care of others.  -     DULoxetine (CYMBALTA) 60 MG capsule; Take 1 capsule (60 mg total) by mouth once daily.  Dispense: 30 capsule; Refill: 6    Paroxysmal atrial fibrillation  · Stable, on Lopressor as ordered, followed by Dr. Abbasi.        To follow-up in 3 months or sooner if needed.  RTC prn.        FAHAD Blackburnseric [facility]

## 2019-07-03 ENCOUNTER — PATIENT MESSAGE (OUTPATIENT)
Dept: UROLOGY | Facility: CLINIC | Age: 64
End: 2019-07-03

## 2019-08-23 ENCOUNTER — PATIENT MESSAGE (OUTPATIENT)
Dept: FAMILY MEDICINE | Facility: CLINIC | Age: 64
End: 2019-08-23

## 2019-08-23 NOTE — TELEPHONE ENCOUNTER
Requesting low dose of clonazepam as well due to not being able to sleep, mobic, and generic form of esgic.     Mobic only active Rx.     Esgic last order 05/30/2019  Clonazepam never written

## 2019-08-24 RX ORDER — MELOXICAM 15 MG/1
TABLET ORAL
Qty: 30 TABLET | Refills: 0 | OUTPATIENT
Start: 2019-08-24

## 2019-09-03 ENCOUNTER — OFFICE VISIT (OUTPATIENT)
Dept: OPHTHALMOLOGY | Facility: CLINIC | Age: 64
End: 2019-09-03
Payer: COMMERCIAL

## 2019-09-03 DIAGNOSIS — H18.831 RECURRENT CORNEAL EROSION, RIGHT: Primary | ICD-10-CM

## 2019-09-03 PROCEDURE — 99212 PR OFFICE/OUTPT VISIT, EST, LEVL II, 10-19 MIN: ICD-10-PCS | Mod: S$GLB,,, | Performed by: OPHTHALMOLOGY

## 2019-09-03 PROCEDURE — 99999 PR PBB SHADOW E&M-EST. PATIENT-LVL II: CPT | Mod: PBBFAC,,, | Performed by: OPHTHALMOLOGY

## 2019-09-03 PROCEDURE — 99212 OFFICE O/P EST SF 10 MIN: CPT | Mod: S$GLB,,, | Performed by: OPHTHALMOLOGY

## 2019-09-03 PROCEDURE — 99999 PR PBB SHADOW E&M-EST. PATIENT-LVL II: ICD-10-PCS | Mod: PBBFAC,,, | Performed by: OPHTHALMOLOGY

## 2019-09-03 NOTE — PROGRESS NOTES
===============================  Yolis Bourgeois,   63 y.o. female   Last visit [facility]: :7/1/2019   Last visit eye dept. 7/1/2019  [facility]:  Uncorrected distance visual acuity was 20/30 in the right eye and 20/40 in the left eye.  Tonometry     Tonometry (Applanation, 12:14 PM)       Right Left    Pressure 14 14               Not recorded         Not recorded         Not recorded        Chief Complaint   Patient presents with    Eye Pain     pt states that her right eye is hurting and eyelid is swollen,  started up yesterday, she doesnt know if she might have scratched it.     Ophthalmic Medications     Ophthalmic-Intraocular Pressure Reducing Agents, Prostaglandin Analogs Start End     latanoprost (XALATAN) 0.005 % ophthalmic solution    5/29/2019 6/23/2020    Sig: Place 1 drop into both eyes once daily.    Route: Both Eyes           ________________  9/3/2019  HPI     Eye Pain      Additional comments: pt states that her right eye is hurting and eyelid   is swollen,  started up yesterday, she doesnt know if she might have   scratched it.              Comments     1. Anatomic variant mac degen  2. Cobblestone od  3. Dry eyes  Punctal plugs ou  4. H/O Lasik  5. H/O Iritis os  HVF 3/18/19        Restasis BID OU  Naphcon A QAM OU  Systane Wipes  Latanoprost OU qhs  Oasys tears          Last edited by Nessa Cheng on 9/3/2019 11:42 AM. (History)      Problem List Items Addressed This Visit     None      Visit Diagnoses     Recurrent corneal erosion, right    -  Primary          .10:00 2 mm in from limbus healing line OD  Known RCES  Resume ointment q1h today, q3h tomorrow  Then resume qhs, and qam  INB, call         ===========================

## 2019-09-06 ENCOUNTER — TELEPHONE (OUTPATIENT)
Dept: FAMILY MEDICINE | Facility: CLINIC | Age: 64
End: 2019-09-06

## 2019-09-06 RX ORDER — MELOXICAM 15 MG/1
TABLET ORAL
Qty: 30 TABLET | Refills: 0 | Status: SHIPPED | OUTPATIENT
Start: 2019-09-06 | End: 2020-02-25 | Stop reason: SDUPTHER

## 2019-09-06 RX ORDER — BUTALBITAL, ACETAMINOPHEN AND CAFFEINE 50; 325; 40 MG/1; MG/1; MG/1
TABLET ORAL
Qty: 60 TABLET | Refills: 0 | Status: SHIPPED | OUTPATIENT
Start: 2019-09-06 | End: 2019-09-09 | Stop reason: SDUPTHER

## 2019-09-06 NOTE — TELEPHONE ENCOUNTER
Please see message. Per new law any medication  that is greater than 7 day prescription has to say  necessity.

## 2019-09-06 NOTE — TELEPHONE ENCOUNTER
----- Message from Miriam Vergara sent at 9/6/2019 12:48 PM CDT -----  Contact: Chacha Lockhart - Ms [place]  Stated she calling about pt medication and new law in [place]. For medication greater then 7 days, it missing some information, she can be reached at [phone] Thanks

## 2019-09-09 RX ORDER — BUTALBITAL, ACETAMINOPHEN AND CAFFEINE 50; 325; 40 MG/1; MG/1; MG/1
1 TABLET ORAL
Qty: 60 TABLET | Refills: 0 | Status: SHIPPED | OUTPATIENT
Start: 2019-09-09 | End: 2020-05-11 | Stop reason: SDUPTHER

## 2019-10-04 ENCOUNTER — PATIENT MESSAGE (OUTPATIENT)
Dept: OPHTHALMOLOGY | Facility: CLINIC | Age: 64
End: 2019-10-04

## 2019-10-04 RX ORDER — CYCLOSPORINE 0.5 MG/ML
1 EMULSION OPHTHALMIC 2 TIMES DAILY
Qty: 62 VIAL | Refills: 11 | Status: SHIPPED | OUTPATIENT
Start: 2019-10-04 | End: 2020-12-08 | Stop reason: SDUPTHER

## 2019-10-07 ENCOUNTER — OFFICE VISIT (OUTPATIENT)
Dept: FAMILY MEDICINE | Facility: CLINIC | Age: 64
End: 2019-10-07
Payer: COMMERCIAL

## 2019-10-07 VITALS
SYSTOLIC BLOOD PRESSURE: 124 MMHG | TEMPERATURE: 96 F | WEIGHT: 172.38 LBS | OXYGEN SATURATION: 97 % | DIASTOLIC BLOOD PRESSURE: 80 MMHG | BODY MASS INDEX: 30.54 KG/M2 | HEART RATE: 73 BPM | HEIGHT: 63 IN

## 2019-10-07 DIAGNOSIS — G43.909 MIGRAINE WITHOUT STATUS MIGRAINOSUS, NOT INTRACTABLE, UNSPECIFIED MIGRAINE TYPE: ICD-10-CM

## 2019-10-07 DIAGNOSIS — I48.0 PAROXYSMAL ATRIAL FIBRILLATION: ICD-10-CM

## 2019-10-07 DIAGNOSIS — F43.22 ADJUSTMENT DISORDER WITH ANXIOUS MOOD: Primary | ICD-10-CM

## 2019-10-07 DIAGNOSIS — E03.9 ACQUIRED HYPOTHYROIDISM: ICD-10-CM

## 2019-10-07 PROBLEM — H18.30 CORNEAL EPITHELIAL DEFECT: Status: RESOLVED | Noted: 2017-01-27 | Resolved: 2019-10-07

## 2019-10-07 PROCEDURE — 3008F PR BODY MASS INDEX (BMI) DOCUMENTED: ICD-10-PCS | Mod: CPTII,S$GLB,, | Performed by: FAMILY MEDICINE

## 2019-10-07 PROCEDURE — 99999 PR PBB SHADOW E&M-EST. PATIENT-LVL III: CPT | Mod: PBBFAC,,, | Performed by: FAMILY MEDICINE

## 2019-10-07 PROCEDURE — 99999 PR PBB SHADOW E&M-EST. PATIENT-LVL III: ICD-10-PCS | Mod: PBBFAC,,, | Performed by: FAMILY MEDICINE

## 2019-10-07 PROCEDURE — 99214 OFFICE O/P EST MOD 30 MIN: CPT | Mod: S$GLB,,, | Performed by: FAMILY MEDICINE

## 2019-10-07 PROCEDURE — 3008F BODY MASS INDEX DOCD: CPT | Mod: CPTII,S$GLB,, | Performed by: FAMILY MEDICINE

## 2019-10-07 PROCEDURE — 99214 PR OFFICE/OUTPT VISIT, EST, LEVL IV, 30-39 MIN: ICD-10-PCS | Mod: S$GLB,,, | Performed by: FAMILY MEDICINE

## 2019-10-07 RX ORDER — CYCLOBENZAPRINE HCL 5 MG
5-10 TABLET ORAL 3 TIMES DAILY PRN
Qty: 30 TABLET | Refills: 0 | Status: SHIPPED | OUTPATIENT
Start: 2019-10-07 | End: 2019-10-17

## 2019-10-07 NOTE — PROGRESS NOTES
CHIEF COMPLAINT:  This is a 63-year-old female here for follow-up anxiety.    SUBJECTIVE:  The patient has not been seen by me in over 2 years.  The patient has adjustment disorder with anxious mood due to caring for her  who  has had a complicated medical course resulting in sequelae.  He currently has a suprapubic catheter in place and is undergoing bladder training for removal.  His physical recovery has been very slow.  She is the primary caregiver.  She is pessimistic that he will return to his former physical state and therefore has intermittent dysphoria.  Her anxiety appears to be adequately controlled on Cymbalta 60 mg daily.  However, she has episodes of muscle tension and sleeplessness.  Patient denies suicidal ideation, guilt, fear, lack of concentration or manic episodes.  She has intermittent anhedonia.    Patient is being treated for hypothyroidism with pork thyroid.  She has a history of   insulin resistance.  She takes meloxicam for joint pain in Linzess for constipation.  She takes Propecia for hair loss and metoprolol 12.5 mg twice daily for migraine prophylaxis and history of paroxysmal atrial fibrillation.    ROS:  GENERAL: Patient denies fever, chills, night sweats.  Patient denies weight loss. Patient denies anorexia, fatigue, weakness or swollen glands.  SKIN: Patient denies rash or hair loss.  HEENT: Patient denies sore throat, ear pain, hearing loss, nasal congestion, or runny nose. Patient denies visual disturbance, eye irritation or discharge.  LUNGS: Patient denies cough, wheeze or hemoptysis.  CARDIOVASCULAR: Patient denies chest pain, shortness of breath, palpitations, syncope or lower extremity edema.  GI: Patient denies abdominal pain, nausea, vomiting, diarrhea, constipation, blood in stool or melena.  GENITOURINARY:  Patient denies dysuria, frequency, hematuria, nocturia, urgency or incontinence.  MUSCULOSKELETAL: Patient denies joint swelling, redness or  warmth.  NEUROLOGIC: Patient denies headache, vertigo, paresthesias, weakness in limb, dysarthria, dysphagia or abnormality of gait.  PSYCHIATRIC:  Positive for anxiety and dysphoria.  Positive for sleeplessness.  Patient denies memory loss.     OBJECTIVE:   GENERAL: Well-developed well-nourished overweight white female alert and oriented x3, in no acute distress.  Memory, judgment and cognition without deficit.  No hallucinations, delusions or flight of ideas.  SKIN: Clear without rash.  Normal color and tone.  HEENT: Eyes: Clear conjunctivae. No scleral icterus.  NECK: Supple, normal range of motion.  No masses, lymphadenopathy or enlarged thyroid.  No JVD.  Carotids 2+ and equal.  No bruits.  LUNGS: Clear to auscultation.  Normal respiratory effort.  CARDIOVASCULAR:  Regular rhythm, normal S1, S2 without murmur, gallop or rub.  EXTREMITIES: Without cyanosis, clubbing or edema.  Distal pulses 2+ and equal.  Normal range of motion in all extremities.  No joint effusion, erythema or warmth.  NEUROLOGIC:  Gait without abnormality.  No tremor.      Lengthy discussion with patient regarding pathophysiology of depression and anxiety. Discussed rationale behind treatment. Reviewed treatment options, including medication and psychotherapy. Reviewed pharmacology of SNRIs including onset of action, dosing, side effects, adverse reactions and duration of therapy (6-12 months).  Discussed the need to stay on medication if it is effective and not discontinue after a few weeks due to the probability of relapse.    ASSESSMENT:  1. Adjustment disorder with anxious mood    2. Acquired hypothyroidism    3. Paroxysmal atrial fibrillation    4. Migraine without status migrainosus, not intractable, unspecified migraine type      PLAN:   1.  Continue Cymbalta 60 mg daily.  2.  Patient declines mental health counseling.  3.  Cyclobenzaprine 5-10 mg 3 times daily as needed for muscle spasm.  4.  Return to clinic in December 2019 for  preventive health exam and fasting lab.    This visit was 25 min, greater than 50% of which involved counseling.    This note is generated with speech recognition software and is subject to transcription error and sound alike phrases that may be missed by proofreading.

## 2019-11-25 ENCOUNTER — TELEPHONE (OUTPATIENT)
Dept: FAMILY MEDICINE | Facility: CLINIC | Age: 64
End: 2019-11-25

## 2019-11-25 ENCOUNTER — PATIENT MESSAGE (OUTPATIENT)
Dept: ADMINISTRATIVE | Facility: OTHER | Age: 64
End: 2019-11-25

## 2019-11-25 DIAGNOSIS — Z12.31 ENCOUNTER FOR SCREENING MAMMOGRAM FOR MALIGNANT NEOPLASM OF BREAST: Primary | ICD-10-CM

## 2019-11-25 NOTE — TELEPHONE ENCOUNTER
----- Message from Tressa Sinha sent at 11/25/2019 11:24 AM CST -----  Contact: Patient  Patient requesting an order for a mammo, she would like to have it on 12/6, before she sees Dr Johnson. Please call her back at [phone]. Thank you

## 2019-11-26 DIAGNOSIS — Z13.79 GENETIC SCREENING: ICD-10-CM

## 2019-12-02 ENCOUNTER — OFFICE VISIT (OUTPATIENT)
Dept: OPHTHALMOLOGY | Facility: CLINIC | Age: 64
End: 2019-12-02
Payer: COMMERCIAL

## 2019-12-02 DIAGNOSIS — H04.129 DRYNESS, EYE: ICD-10-CM

## 2019-12-02 DIAGNOSIS — H16.001 CORNEAL EROSION OF RIGHT EYE: ICD-10-CM

## 2019-12-02 DIAGNOSIS — H35.433 PAVING STONE RETINAL DEGENERATION OF BOTH EYES: ICD-10-CM

## 2019-12-02 DIAGNOSIS — H40.1131 PRIMARY OPEN ANGLE GLAUCOMA (POAG) OF BOTH EYES, MILD STAGE: Primary | ICD-10-CM

## 2019-12-02 PROCEDURE — 99999 PR PBB SHADOW E&M-EST. PATIENT-LVL II: ICD-10-PCS | Mod: PBBFAC,,, | Performed by: OPHTHALMOLOGY

## 2019-12-02 PROCEDURE — 99999 PR PBB SHADOW E&M-EST. PATIENT-LVL II: CPT | Mod: PBBFAC,,, | Performed by: OPHTHALMOLOGY

## 2019-12-02 PROCEDURE — 92014 PR EYE EXAM, EST PATIENT,COMPREHESV: ICD-10-PCS | Mod: S$GLB,,, | Performed by: OPHTHALMOLOGY

## 2019-12-02 PROCEDURE — 92014 COMPRE OPH EXAM EST PT 1/>: CPT | Mod: S$GLB,,, | Performed by: OPHTHALMOLOGY

## 2019-12-02 PROCEDURE — 92133 POSTERIOR SEGMENT OCT OPTIC NERVE(OCULAR COHERENCE TOMOGRAPHY) - OU - BOTH EYES: ICD-10-PCS | Mod: S$GLB,,, | Performed by: OPHTHALMOLOGY

## 2019-12-02 PROCEDURE — 92133 CPTRZD OPH DX IMG PST SGM ON: CPT | Mod: S$GLB,,, | Performed by: OPHTHALMOLOGY

## 2019-12-02 NOTE — PROGRESS NOTES
===============================  Yolis Bourgeois,   64 y.o. female   Last visit [facility]: :9/3/2019   Last visit eye dept. 9/3/2019  [facility]:  Uncorrected distance visual acuity was 20/30 in the right eye and 20/30 in the left eye.  Tonometry     Tonometry (Applanation, 8:26 AM)       Right Left    Pressure 18 16               Not recorded        Manifest Refraction     Manifest Refraction       Sphere Dist VA    Right -0.75 20/20    Left -0.75 20/25               Not recorded        Chief Complaint   Patient presents with    Glaucoma     pt states va is good, compliant with gtts    Dry Eye     theratears is fantastic, and restasis    cobblestone     Ophthalmic Medications     Ophthalmic-Intraocular Pressure Reducing Agents, Prostaglandin Analogs Start End     latanoprost (XALATAN) 0.005 % ophthalmic solution    5/29/2019 6/23/2020    Sig: Place 1 drop into both eyes once daily.    Route: Both Eyes           ________________  12/2/2019  HPI     Glaucoma      Additional comments: pt states va is good, compliant with gtts              Dry Eye      Additional comments: theratears is fantastic, and restasis              Comments     1. Anatomic variant mac degen  2. Cobblestone od  3. Dry eyes  Punctal plugs ou  4. H/O Lasik  5. H/O Iritis os  HVF 3/18/19  6.poag  7.rce od        Restasis BID OU  Naphcon A QAM OU  Systane Wipes  Latanoprost OU qhs  Oasys tears          Last edited by CARLA Buck on 12/2/2019  8:21 AM. 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

## 2019-12-06 ENCOUNTER — OFFICE VISIT (OUTPATIENT)
Dept: FAMILY MEDICINE | Facility: CLINIC | Age: 64
End: 2019-12-06
Payer: COMMERCIAL

## 2019-12-06 ENCOUNTER — LAB VISIT (OUTPATIENT)
Dept: LAB | Facility: HOSPITAL | Age: 64
End: 2019-12-06
Payer: COMMERCIAL

## 2019-12-06 VITALS
DIASTOLIC BLOOD PRESSURE: 88 MMHG | TEMPERATURE: 99 F | HEART RATE: 68 BPM | BODY MASS INDEX: 30.54 KG/M2 | OXYGEN SATURATION: 96 % | WEIGHT: 172.38 LBS | SYSTOLIC BLOOD PRESSURE: 139 MMHG | HEIGHT: 63 IN

## 2019-12-06 DIAGNOSIS — G43.909 MIGRAINE WITHOUT STATUS MIGRAINOSUS, NOT INTRACTABLE, UNSPECIFIED MIGRAINE TYPE: ICD-10-CM

## 2019-12-06 DIAGNOSIS — Z00.00 PREVENTATIVE HEALTH CARE: Primary | ICD-10-CM

## 2019-12-06 DIAGNOSIS — Z00.00 PREVENTATIVE HEALTH CARE: ICD-10-CM

## 2019-12-06 DIAGNOSIS — E03.9 ACQUIRED HYPOTHYROIDISM: Chronic | ICD-10-CM

## 2019-12-06 DIAGNOSIS — I48.0 PAROXYSMAL ATRIAL FIBRILLATION: Chronic | ICD-10-CM

## 2019-12-06 DIAGNOSIS — F43.22 ADJUSTMENT DISORDER WITH ANXIOUS MOOD: Chronic | ICD-10-CM

## 2019-12-06 PROBLEM — H16.001 CORNEAL EROSION OF RIGHT EYE: Status: RESOLVED | Noted: 2019-12-02 | Resolved: 2019-12-06

## 2019-12-06 LAB
ALBUMIN SERPL BCP-MCNC: 4.3 G/DL (ref 3.5–5.2)
ALP SERPL-CCNC: 82 U/L (ref 55–135)
ALT SERPL W/O P-5'-P-CCNC: 38 U/L (ref 10–44)
ANION GAP SERPL CALC-SCNC: 9 MMOL/L (ref 8–16)
AST SERPL-CCNC: 30 U/L (ref 10–40)
BASOPHILS # BLD AUTO: 0.04 K/UL (ref 0–0.2)
BASOPHILS NFR BLD: 0.6 % (ref 0–1.9)
BILIRUB SERPL-MCNC: 0.4 MG/DL (ref 0.1–1)
BUN SERPL-MCNC: 16 MG/DL (ref 8–23)
CALCIUM SERPL-MCNC: 9.9 MG/DL (ref 8.7–10.5)
CHLORIDE SERPL-SCNC: 103 MMOL/L (ref 95–110)
CHOLEST SERPL-MCNC: 224 MG/DL (ref 120–199)
CHOLEST/HDLC SERPL: 3.2 {RATIO} (ref 2–5)
CO2 SERPL-SCNC: 27 MMOL/L (ref 23–29)
CREAT SERPL-MCNC: 0.7 MG/DL (ref 0.5–1.4)
DIFFERENTIAL METHOD: ABNORMAL
EOSINOPHIL # BLD AUTO: 0.1 K/UL (ref 0–0.5)
EOSINOPHIL NFR BLD: 1.8 % (ref 0–8)
ERYTHROCYTE [DISTWIDTH] IN BLOOD BY AUTOMATED COUNT: 12.6 % (ref 11.5–14.5)
EST. GFR  (AFRICAN AMERICAN): >60 ML/MIN/1.73 M^2
EST. GFR  (NON AFRICAN AMERICAN): >60 ML/MIN/1.73 M^2
GLUCOSE SERPL-MCNC: 88 MG/DL (ref 70–110)
HCT VFR BLD AUTO: 46.2 % (ref 37–48.5)
HDLC SERPL-MCNC: 70 MG/DL (ref 40–75)
HDLC SERPL: 31.3 % (ref 20–50)
HGB BLD-MCNC: 14.4 G/DL (ref 12–16)
IMM GRANULOCYTES # BLD AUTO: 0.02 K/UL (ref 0–0.04)
IMM GRANULOCYTES NFR BLD AUTO: 0.3 % (ref 0–0.5)
LDLC SERPL CALC-MCNC: 135 MG/DL (ref 63–159)
LYMPHOCYTES # BLD AUTO: 2.2 K/UL (ref 1–4.8)
LYMPHOCYTES NFR BLD: 35.4 % (ref 18–48)
MCH RBC QN AUTO: 31 PG (ref 27–31)
MCHC RBC AUTO-ENTMCNC: 31.2 G/DL (ref 32–36)
MCV RBC AUTO: 100 FL (ref 82–98)
MONOCYTES # BLD AUTO: 0.6 K/UL (ref 0.3–1)
MONOCYTES NFR BLD: 9.5 % (ref 4–15)
NEUTROPHILS # BLD AUTO: 3.2 K/UL (ref 1.8–7.7)
NEUTROPHILS NFR BLD: 52.4 % (ref 38–73)
NONHDLC SERPL-MCNC: 154 MG/DL
NRBC BLD-RTO: 0 /100 WBC
PLATELET # BLD AUTO: 278 K/UL (ref 150–350)
PMV BLD AUTO: 9.2 FL (ref 9.2–12.9)
POTASSIUM SERPL-SCNC: 4.7 MMOL/L (ref 3.5–5.1)
PROT SERPL-MCNC: 7.6 G/DL (ref 6–8.4)
RBC # BLD AUTO: 4.64 M/UL (ref 4–5.4)
SODIUM SERPL-SCNC: 139 MMOL/L (ref 136–145)
TRIGL SERPL-MCNC: 95 MG/DL (ref 30–150)
TSH SERPL DL<=0.005 MIU/L-ACNC: 0.68 UIU/ML (ref 0.4–4)
WBC # BLD AUTO: 6.18 K/UL (ref 3.9–12.7)

## 2019-12-06 PROCEDURE — 99999 PR PBB SHADOW E&M-EST. PATIENT-LVL III: CPT | Mod: PBBFAC,,, | Performed by: FAMILY MEDICINE

## 2019-12-06 PROCEDURE — 80053 COMPREHEN METABOLIC PANEL: CPT

## 2019-12-06 PROCEDURE — 99999 PR PBB SHADOW E&M-EST. PATIENT-LVL III: ICD-10-PCS | Mod: PBBFAC,,, | Performed by: FAMILY MEDICINE

## 2019-12-06 PROCEDURE — 80061 LIPID PANEL: CPT

## 2019-12-06 PROCEDURE — 84443 ASSAY THYROID STIM HORMONE: CPT

## 2019-12-06 PROCEDURE — 99396 PREV VISIT EST AGE 40-64: CPT | Mod: S$GLB,,, | Performed by: FAMILY MEDICINE

## 2019-12-06 PROCEDURE — 36415 COLL VENOUS BLD VENIPUNCTURE: CPT | Mod: PO

## 2019-12-06 PROCEDURE — 99396 PR PREVENTIVE VISIT,EST,40-64: ICD-10-PCS | Mod: S$GLB,,, | Performed by: FAMILY MEDICINE

## 2019-12-06 PROCEDURE — 85025 COMPLETE CBC W/AUTO DIFF WBC: CPT

## 2019-12-06 NOTE — PROGRESS NOTES
CHIEF COMPLAINT: This is a 64-year-old female here for preventive health exam.     SUBJECTIVE:  Patient is doing well without complaints except for situational stress due to 's illness.  She sees a cardiologist for PAF and palpitations for which she takes metoprolol 12.5 mg twice daily. She is taking pork thyroid for hypothyroidism per OB-GYN and finasteride for hair loss.  She's gained 20 pounds in the last year.  She takes meloxicam for joint pain and Linzess for constipation (per GI).  The patient has migraine headaches and takes Fioricet as needed.    Eye exam December 2019.  Mammogram November 2018.  Colonoscopy May 2011, due again in May 2021.  Tdap June 2013.  Flu vaccine October 2019.     ROS:  GENERAL: Patient denies fever, chills, night sweats.  Patient denies weight loss. Patient denies anorexia, fatigue, weakness or swollen glands.  SKIN: Patient denies rash or hair loss.  HEENT: Patient denies sore throat, ear pain, hearing loss, nasal congestion, or runny nose. Patient denies visual disturbance, eye irritation or discharge.  LUNGS: Patient denies cough, wheeze or hemoptysis.  CARDIOVASCULAR: Patient denies chest pain, shortness of breath, palpitations, syncope or lower extremity edema.  GI: Patient denies abdominal pain, nausea, vomiting, diarrhea, constipation, blood in stool or melena.  GENITOURINARY: Patient denies pelvic pain, vaginal discharge, itch or odor. Patient denies irregular vaginal bleeding.  Patient denies dysuria, frequency, hematuria, nocturia, urgency or incontinence.  BREASTS: Patient denies breast pain, mass or nipple discharge.  MUSCULOSKELETAL: Patient denies joint swelling, redness or warmth.  NEUROLOGIC: Patient denies headache, vertigo, paresthesias, weakness in limb, dysarthria, dysphagia or abnormality of gait.  PSYCHIATRIC: Patient denies anxiety, depression, or memory loss.     OBJECTIVE:   GENERAL: Well-developed well-nourished overweight white female alert and  oriented x3, in no acute distress.  Memory, judgment and cognition without deficit.   SKIN: Clear without rash.  Normal color and tone.  HEENT: Eyes: Clear conjunctivae. No scleral icterus.  Pupils equal reactive to light and accommodation.  Ears: Clear canals. Clear TMs.  Nose: Without congestion.  Pharynx: Without injection or exudates.  NECK: Supple, normal range of motion.  No masses, lymphadenopathy or enlarged thyroid.  No JVD.  Carotids 2+ and equal.  No bruits.  LUNGS: Clear to auscultation.  Normal respiratory effort.  CARDIOVASCULAR:  Regular rhythm, normal S1, S2 without murmur, gallop or rub.  BACK:  No CVA or spinal tenderness.  BREASTS:  No masses, tenderness or nipple discharge.  ABDOMEN: Normal appearance.  Active bowel sounds.  Soft, nontender without mass or organomegaly.  No rebound or guarding.  EXTREMITIES: Without cyanosis, clubbing or edema.  Distal pulses 2+ and equal.  Normal range of motion in all extremities.  No joint effusion, erythema or warmth.  NEUROLOGIC:   Cranial nerves II through XII without deficit.  Motor strength equal bilaterally.  Sensation normal to touch.  Deep tendon reflexes 2+ and equal.  Gait without abnormality.  No tremor.  Negative cerebellar signs.  PELVIC: Deferred.  SELVIN: No masses, nontender.  Heme-negative stool ×2.    ASSESSMENT:  1. Preventative health care    2. Acquired hypothyroidism    3. Paroxysmal atrial fibrillation    4. Adjustment disorder with anxious mood    5. Migraine without status migrainosus, not intractable, unspecified migraine type      PLAN:  1.  Weight reduction. Exercise regularly.  2.  Age-appropriate counseling.  3.  Fasting lab.  4.  Mammogram.  5.  Refill medications as needed.  6.  Follow-up annually.     This note is generated with speech recognition software and is subject to transcription error and sound alike phrases that may be missed by proofreading.

## 2019-12-23 ENCOUNTER — HOSPITAL ENCOUNTER (OUTPATIENT)
Dept: RADIOLOGY | Facility: HOSPITAL | Age: 64
Discharge: HOME | End: 2019-12-23
Attending: FAMILY MEDICINE
Payer: COMMERCIAL

## 2019-12-23 VITALS — HEIGHT: 63 IN | BODY MASS INDEX: 30.54 KG/M2 | WEIGHT: 172.38 LBS

## 2019-12-23 DIAGNOSIS — Z12.31 ENCOUNTER FOR SCREENING MAMMOGRAM FOR MALIGNANT NEOPLASM OF BREAST: ICD-10-CM

## 2019-12-23 PROCEDURE — 77063 BREAST TOMOSYNTHESIS BI: CPT | Mod: 26,,, | Performed by: RADIOLOGY

## 2019-12-23 PROCEDURE — 77067 MAMMO DIGITAL SCREENING BILAT WITH TOMOSYNTHESIS_CAD: ICD-10-PCS | Mod: 26,,, | Performed by: RADIOLOGY

## 2019-12-23 PROCEDURE — 77067 SCR MAMMO BI INCL CAD: CPT | Mod: TC

## 2019-12-23 PROCEDURE — 77067 SCR MAMMO BI INCL CAD: CPT | Mod: 26,,, | Performed by: RADIOLOGY

## 2019-12-23 PROCEDURE — 77063 MAMMO DIGITAL SCREENING BILAT WITH TOMOSYNTHESIS_CAD: ICD-10-PCS | Mod: 26,,, | Performed by: RADIOLOGY

## 2020-01-06 DIAGNOSIS — F43.22 ADJUSTMENT DISORDER WITH ANXIOUS MOOD: ICD-10-CM

## 2020-01-06 RX ORDER — DULOXETIN HYDROCHLORIDE 60 MG/1
CAPSULE, DELAYED RELEASE ORAL
Qty: 30 CAPSULE | Refills: 11 | Status: SHIPPED | OUTPATIENT
Start: 2020-01-06 | End: 2020-09-22

## 2020-01-20 ENCOUNTER — PATIENT MESSAGE (OUTPATIENT)
Dept: FAMILY MEDICINE | Facility: CLINIC | Age: 65
End: 2020-01-20

## 2020-01-20 DIAGNOSIS — Z00.00 PREVENTATIVE HEALTH CARE: Primary | ICD-10-CM

## 2020-01-28 ENCOUNTER — PATIENT MESSAGE (OUTPATIENT)
Dept: FAMILY MEDICINE | Facility: CLINIC | Age: 65
End: 2020-01-28

## 2020-01-29 ENCOUNTER — LAB VISIT (OUTPATIENT)
Dept: LAB | Facility: HOSPITAL | Age: 65
End: 2020-01-29
Attending: FAMILY MEDICINE
Payer: COMMERCIAL

## 2020-01-29 DIAGNOSIS — Z00.00 PREVENTATIVE HEALTH CARE: ICD-10-CM

## 2020-01-29 PROCEDURE — 36415 COLL VENOUS BLD VENIPUNCTURE: CPT | Mod: PO

## 2020-01-29 PROCEDURE — 83525 ASSAY OF INSULIN: CPT

## 2020-01-29 PROCEDURE — 83036 HEMOGLOBIN GLYCOSYLATED A1C: CPT

## 2020-01-30 LAB
ESTIMATED AVG GLUCOSE: 108 MG/DL (ref 68–131)
HBA1C MFR BLD HPLC: 5.4 % (ref 4–5.6)
INSULIN COLLECTION INTERVAL: NORMAL
INSULIN SERPL-ACNC: 6.6 UU/ML

## 2020-02-25 ENCOUNTER — PATIENT MESSAGE (OUTPATIENT)
Dept: FAMILY MEDICINE | Facility: CLINIC | Age: 65
End: 2020-02-25

## 2020-02-25 ENCOUNTER — OFFICE VISIT (OUTPATIENT)
Dept: FAMILY MEDICINE | Facility: CLINIC | Age: 65
End: 2020-02-25
Payer: COMMERCIAL

## 2020-02-25 VITALS
DIASTOLIC BLOOD PRESSURE: 96 MMHG | WEIGHT: 175.5 LBS | SYSTOLIC BLOOD PRESSURE: 148 MMHG | BODY MASS INDEX: 31.1 KG/M2 | TEMPERATURE: 98 F | HEART RATE: 73 BPM | HEIGHT: 63 IN | OXYGEN SATURATION: 96 %

## 2020-02-25 DIAGNOSIS — M19.011 OSTEOARTHRITIS OF RIGHT SHOULDER, UNSPECIFIED OSTEOARTHRITIS TYPE: Primary | ICD-10-CM

## 2020-02-25 PROCEDURE — 99999 PR PBB SHADOW E&M-EST. PATIENT-LVL IV: CPT | Mod: PBBFAC,,, | Performed by: REGISTERED NURSE

## 2020-02-25 PROCEDURE — 3008F PR BODY MASS INDEX (BMI) DOCUMENTED: ICD-10-PCS | Mod: CPTII,S$GLB,, | Performed by: REGISTERED NURSE

## 2020-02-25 PROCEDURE — 99999 PR PBB SHADOW E&M-EST. PATIENT-LVL IV: ICD-10-PCS | Mod: PBBFAC,,, | Performed by: REGISTERED NURSE

## 2020-02-25 PROCEDURE — 96372 PR INJECTION,THERAP/PROPH/DIAG2ST, IM OR SUBCUT: ICD-10-PCS | Mod: S$GLB,,, | Performed by: REGISTERED NURSE

## 2020-02-25 PROCEDURE — 99213 PR OFFICE/OUTPT VISIT, EST, LEVL III, 20-29 MIN: ICD-10-PCS | Mod: 25,S$GLB,, | Performed by: REGISTERED NURSE

## 2020-02-25 PROCEDURE — 99213 OFFICE O/P EST LOW 20 MIN: CPT | Mod: 25,S$GLB,, | Performed by: REGISTERED NURSE

## 2020-02-25 PROCEDURE — 96372 THER/PROPH/DIAG INJ SC/IM: CPT | Mod: S$GLB,,, | Performed by: REGISTERED NURSE

## 2020-02-25 PROCEDURE — 3008F BODY MASS INDEX DOCD: CPT | Mod: CPTII,S$GLB,, | Performed by: REGISTERED NURSE

## 2020-02-25 RX ORDER — BETAMETHASONE SODIUM PHOSPHATE AND BETAMETHASONE ACETATE 3; 3 MG/ML; MG/ML
9 INJECTION, SUSPENSION INTRA-ARTICULAR; INTRALESIONAL; INTRAMUSCULAR; SOFT TISSUE
Status: COMPLETED | OUTPATIENT
Start: 2020-02-25 | End: 2020-02-25

## 2020-02-25 RX ORDER — MELOXICAM 15 MG/1
TABLET ORAL
Qty: 90 TABLET | Refills: 0 | Status: SHIPPED | OUTPATIENT
Start: 2020-02-25 | End: 2020-10-22 | Stop reason: SDUPTHER

## 2020-02-25 RX ADMIN — BETAMETHASONE SODIUM PHOSPHATE AND BETAMETHASONE ACETATE 9 MG: 3; 3 INJECTION, SUSPENSION INTRA-ARTICULAR; INTRALESIONAL; INTRAMUSCULAR; SOFT TISSUE at 09:02

## 2020-02-25 NOTE — PROGRESS NOTES
Subjective:       Patient ID: Yolis Bourgeois is a 64 y.o. female.    Chief Complaint   Patient presents with    Shoulder Pain       HPI    Yolis Bourgeois is here today with c/o recurrent right shoulder pain for the past few weeks.  See xray report on file.  NO new injury or trauma.  She has been caring for her  at home who has been dealing w/ chronic medical issues -- helping him to the restroom, ambulate, pulling in bed, etc.  She takes Mobic when needed, usually works after taking for about 3 days, but has not helped this occurrence.  Pain from shoulder joint shoots down RT arm at times, no hand numbness.  Reports headaches, likely tension related.  Using heat to shoulder joint.  Certain positions cause problems with ROM but overall is intact.  No joint swelling.      X-ray Shoulder 2 or More Views Right    Details     Reading Physician Reading Date Result Priority   Herb Shaw DO 5/19/2017       Narrative     3 views of the right shoulder    Comparison: 10/07/2008    Findings: There is no evidence to suggest acute fracture or dislocation.  Mild a.c. joint arthropathy is noted.                  Review of Systems   Constitutional: Negative.    Respiratory: Negative.    Cardiovascular: Negative.    Musculoskeletal: Positive for arthralgias and neck pain. Negative for gait problem and joint swelling.   Skin: Negative.    Neurological: Positive for headaches. Negative for tremors, weakness and numbness.         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    BUTALBITAL-ACETAMINOPHEN-CAFFEINE -40 MG (FIORICET, ESGIC) -40 MG PER TABLET    Take 1 tablet by mouth every 4 to 6 hours as needed for Headaches.    CYCLOSPORINE (RESTASIS) 0.05 % OPHTHALMIC EMULSION    Place 0.4 mLs (1 drop total) into both eyes 2 (two) times daily.    DULOXETINE (CYMBALTA) 60 MG CAPSULE    TAKE 1 CAPSULE BY MOUTH ONCE A DAY    FINASTERIDE (PROPECIA) 1 MG TABLET    Take 1 mg by mouth every other day.     LATANOPROST (XALATAN) 0.005 % OPHTHALMIC SOLUTION    Place 1 drop into both eyes once daily.    MELOXICAM (MOBIC) 15 MG TABLET    TAKE 1 TABLET BY MOUTH ONCE A DAY WITH FOOD    METOPROLOL TARTRATE (LOPRESSOR) 25 MG TABLET    Take 12.5 mg by mouth 2 (two) times daily.    MULTIVITAMIN WITH MINERALS (MULTI-VITAMIN W/MINERALS) CAP    Take 2 capsules by mouth Daily.    THYROID, PORK, (NP THYROID) 90 MG TAB    Take 1 tablet by mouth before breakfast.   Discontinued Medications    ALA-HIST PE 2-10 MG TAB    TAKE 1 TABLET BY MOUTH TWICE DAILY FOR 10 DAYS AS NEEDED FOR CONGESTION    LINACLOTIDE (LINZESS) 145 MCG CAP CAPSULE    Take 1 capsule (145 mcg total) by mouth once daily.             Past medical, surgical, family and social histories have been reviewed today.        Objective:     Vitals:    02/25/20 0813   BP: (!) 148/96   Pulse: 73   Temp: 97.5 °F (36.4 °C)   TempSrc: Oral   SpO2: 96%   Weight: 79.6 kg (175 lb 7.8 oz)   Height: 5' 3" (1.6 m)   PainSc:   6       Estimated body mass index is 30.54 kg/m² as calculated from the following:    Height as of 12/23/19: 5' 3" (1.6 m).    Weight as of 12/23/19: 78.2 kg (172 lb 6.4 oz).      Physical Exam   Constitutional: She is oriented to person, place, and time. She appears well-developed and well-nourished. No distress.   Musculoskeletal:        Right shoulder: She exhibits tenderness. She exhibits " normal range of motion, no swelling, no effusion, no crepitus, no deformity, normal pulse and normal strength.        Arms:  Neurological: She is alert and oriented to person, place, and time.   Skin: Skin is warm and dry. Capillary refill takes less than 2 seconds. No rash noted. She is not diaphoretic. No erythema.   Psychiatric: She has a normal mood and affect. Her behavior is normal. Judgment and thought content normal.   Vitals reviewed.        Diagnosis       1. Osteoarthritis of right shoulder, unspecified osteoarthritis type          Assessment/ Plan     Osteoarthritis of right shoulder, unspecified osteoarthritis type  -     betamethasone acetate-betamethasone sodium phosphate injection 9 mg        Injection today.  Continue Mobic once daily prn w/ food.  Topical pain creams or patches as needed.  Ice, home exercises.  Follow-up in clinic as needed.        Patient Care Team:  Dana Johnson MD as PCP - General (Family Medicine)  Austin Smith LPN as Care Coordinator (Internal Medicine)  Rufino Abbasi MD as Consulting Physician (Cardiology)      FAHAD Blackburn  [facility]

## 2020-03-06 ENCOUNTER — OFFICE VISIT (OUTPATIENT)
Dept: OPHTHALMOLOGY | Facility: CLINIC | Age: 65
End: 2020-03-06
Payer: COMMERCIAL

## 2020-03-06 DIAGNOSIS — H04.129 DRYNESS, EYE: ICD-10-CM

## 2020-03-06 DIAGNOSIS — H40.1131 PRIMARY OPEN ANGLE GLAUCOMA (POAG) OF BOTH EYES, MILD STAGE: Primary | ICD-10-CM

## 2020-03-06 PROCEDURE — 92083 EXTENDED VISUAL FIELD XM: CPT | Mod: S$GLB,,, | Performed by: OPHTHALMOLOGY

## 2020-03-06 PROCEDURE — 92012 INTRM OPH EXAM EST PATIENT: CPT | Mod: S$GLB,,, | Performed by: OPHTHALMOLOGY

## 2020-03-06 PROCEDURE — 92012 PR EYE EXAM, EST PATIENT,INTERMED: ICD-10-PCS | Mod: S$GLB,,, | Performed by: OPHTHALMOLOGY

## 2020-03-06 PROCEDURE — 92083 HUMPHREY VISUAL FIELD - OU - BOTH EYES: ICD-10-PCS | Mod: S$GLB,,, | Performed by: OPHTHALMOLOGY

## 2020-03-06 PROCEDURE — 99999 PR PBB SHADOW E&M-EST. PATIENT-LVL II: CPT | Mod: PBBFAC,,, | Performed by: OPHTHALMOLOGY

## 2020-03-06 PROCEDURE — 99999 PR PBB SHADOW E&M-EST. PATIENT-LVL II: ICD-10-PCS | Mod: PBBFAC,,, | Performed by: OPHTHALMOLOGY

## 2020-03-06 NOTE — PROGRESS NOTES
===============================  Yolis Bourgeois,  3/6/2020 today   64 y.o. female   Last visit [facility]: :12/2/2019   Last visit eye dept. 12/2/2019  [facility]:  Uncorrected distance visual acuity was 20/25 in the right eye and 20/30 in the left eye.  Tonometry     Tonometry (Applanation, 9:13 AM)       Right Left    Pressure 17 15               Not recorded         Not recorded         Not recorded        Chief Complaint   Patient presents with    Glaucoma     3 month HVF and IOP check. using latanoprost QHS OU, restasis BID OU     Ophthalmic Medications     Ophthalmic-Intraocular Pressure Reducing Agents, Prostaglandin Analogs Start End     latanoprost (XALATAN) 0.005 % ophthalmic solution    5/29/2019 6/23/2020    Sig: Place 1 drop into both eyes once daily.    Route: Both Eyes        ________________  3/6/2020 today  HPI     Glaucoma      Additional comments: 3 month HVF and IOP check. using latanoprost QHS   OU, restasis BID OU              Comments     1. Anatomic variant mac degen  2. Cobblestone od  3. Dry eyes  Punctal plugs ou  4. H/O Lasik  5. H/O Iritis os  HVF 3/18/19  6.poag  7.rce od        Restasis BID OU  Naphcon A QAM OU  Systane Wipes  Latanoprost OU qhs  [place] tears          Last edited by Brad Mendez on 3/6/2020  8:52 AM. (History)      Problem List Items Addressed This Visit        Eye/Vision problems    Primary open angle glaucoma (POAG) of both eyes, mild stage - Primary    Relevant Orders    Sung Visual Field - OU - Extended - Both Eyes (Completed)       Other    Dryness, eye      recent steroid injection  iop 21 ou and cct +4(S/P LASIK)  VF poor reliability / repeat at next visit  Cont latanoprost  rtc 4 months        .      ===========================

## 2020-03-09 ENCOUNTER — TELEPHONE (OUTPATIENT)
Dept: FAMILY MEDICINE | Facility: CLINIC | Age: 65
End: 2020-03-09

## 2020-03-09 NOTE — TELEPHONE ENCOUNTER
----- Message from Jossie Mcclendon sent at 3/9/2020 10:28 AM CDT -----  Contact: self/[phone]  Type:  Patient Returning Call    Who Called:Yolis Bourgeois  Who Left Message for Patient:nurse  Does the patient know what this is regarding?:yes  Would the patient rather a call back or a response via [facility]? Call back   Best Call Back Number:[phone]  Additional Information:

## 2020-03-09 NOTE — TELEPHONE ENCOUNTER
----- Message from Jossie Mcclendon sent at 3/9/2020 10:28 AM CDT -----  Contact: self/[phone]  Type:  Patient Returning Call    Who Called:Yolis Bourgeois  Who Left Message for Patient:nurse  Does the patient know what this is regarding?:yes  Would the patient rather a call back or a response via [facility]? Call back   Best Call Back Number:[phone]  Additional Information:

## 2020-04-13 ENCOUNTER — PATIENT MESSAGE (OUTPATIENT)
Dept: UROLOGY | Facility: CLINIC | Age: 65
End: 2020-04-13

## 2020-04-17 ENCOUNTER — OFFICE VISIT (OUTPATIENT)
Dept: FAMILY MEDICINE | Facility: CLINIC | Age: 65
End: 2020-04-17
Payer: COMMERCIAL

## 2020-04-17 VITALS
DIASTOLIC BLOOD PRESSURE: 68 MMHG | SYSTOLIC BLOOD PRESSURE: 112 MMHG | BODY MASS INDEX: 30.46 KG/M2 | WEIGHT: 171.94 LBS | HEIGHT: 63 IN | TEMPERATURE: 98 F

## 2020-04-17 DIAGNOSIS — N39.0 ACUTE UTI (URINARY TRACT INFECTION): Primary | ICD-10-CM

## 2020-04-17 DIAGNOSIS — R39.9 URINARY SYMPTOM OR SIGN: ICD-10-CM

## 2020-04-17 LAB
BILIRUB SERPL-MCNC: ABNORMAL MG/DL
BLOOD URINE, POC: 50
COLOR, POC UA: YELLOW
GLUCOSE UR QL STRIP: ABNORMAL
KETONES UR QL STRIP: ABNORMAL
LEUKOCYTE ESTERASE URINE, POC: ABNORMAL
NITRITE, POC UA: POSITIVE
PH, POC UA: 6
PROTEIN, POC: ABNORMAL
SPECIFIC GRAVITY, POC UA: 1.01
UROBILINOGEN, POC UA: ABNORMAL

## 2020-04-17 PROCEDURE — 81002 POCT URINE DIPSTICK WITHOUT MICROSCOPE: ICD-10-PCS | Mod: S$GLB,,, | Performed by: REGISTERED NURSE

## 2020-04-17 PROCEDURE — 99213 PR OFFICE/OUTPT VISIT, EST, LEVL III, 20-29 MIN: ICD-10-PCS | Mod: 25,S$GLB,, | Performed by: REGISTERED NURSE

## 2020-04-17 PROCEDURE — 3008F PR BODY MASS INDEX (BMI) DOCUMENTED: ICD-10-PCS | Mod: CPTII,S$GLB,, | Performed by: REGISTERED NURSE

## 2020-04-17 PROCEDURE — 81002 URINALYSIS NONAUTO W/O SCOPE: CPT | Mod: S$GLB,,, | Performed by: REGISTERED NURSE

## 2020-04-17 PROCEDURE — 3008F BODY MASS INDEX DOCD: CPT | Mod: CPTII,S$GLB,, | Performed by: REGISTERED NURSE

## 2020-04-17 PROCEDURE — 99999 PR PBB SHADOW E&M-EST. PATIENT-LVL III: ICD-10-PCS | Mod: PBBFAC,,, | Performed by: REGISTERED NURSE

## 2020-04-17 PROCEDURE — 99213 OFFICE O/P EST LOW 20 MIN: CPT | Mod: 25,S$GLB,, | Performed by: REGISTERED NURSE

## 2020-04-17 PROCEDURE — 99999 PR PBB SHADOW E&M-EST. PATIENT-LVL III: CPT | Mod: PBBFAC,,, | Performed by: REGISTERED NURSE

## 2020-04-17 RX ORDER — NITROFURANTOIN 25; 75 MG/1; MG/1
100 CAPSULE ORAL 2 TIMES DAILY
Qty: 14 CAPSULE | Refills: 0 | Status: SHIPPED | OUTPATIENT
Start: 2020-04-17 | End: 2020-04-24

## 2020-04-17 NOTE — PROGRESS NOTES
Subjective:       Patient ID: Yolis Bourgeois is a 64 y.o. female.    Chief Complaint   Patient presents with    Urinary Tract Infection       HPI    Urinary Tract Infection  Patient complains of dysuria. She has had symptoms for 7 days. Patient also complains of back pain, pelvic pain/pressure, bladder spasms and urinary frequency.  Pain described as aching, burning or stabbing; rates her current pain as 7/10 on pain scale. Patient denies fever and hematuria. Patient does have a history of recurrent UTI. Patient does not have a history of pyelonephritis.  She has been increasing her daily water intake, using Azo.      Review of Systems   Constitutional: Negative for chills and fever.   Gastrointestinal: Negative for constipation, nausea and vomiting.   Genitourinary: Positive for dysuria, frequency, pelvic pain and urgency. Negative for hematuria.   Musculoskeletal: Positive for back pain.   Skin: Negative for rash.   Neurological: Negative.          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    Primary open angle glaucoma (POAG) of both eyes, mild stage         Current Outpatient Medications:     aspirin (ECOTRIN) 81 MG EC tablet, Take 81 mg by mouth once daily., Disp: , Rfl:     butalbital-acetaminophen-caffeine -40 mg (FIORICET, ESGIC) -40 mg per tablet, Take 1 tablet by mouth every 4 to 6 hours as needed for Headaches., Disp: 60 tablet, Rfl: 0    cycloSPORINE (RESTASIS) 0.05 % ophthalmic emulsion, Place 0.4 mLs (1 drop total) into both eyes 2 (two) times daily., Disp: 62 vial, Rfl: 11    DULoxetine  "(CYMBALTA) 60 MG capsule, TAKE 1 CAPSULE BY MOUTH ONCE A DAY, Disp: 30 capsule, Rfl: 11    finasteride (PROPECIA) 1 mg tablet, Take 1 mg by mouth every other day. , Disp: , Rfl:     latanoprost (XALATAN) 0.005 % ophthalmic solution, Place 1 drop into both eyes once daily., Disp: 2.5 mL, Rfl: 11    meloxicam (MOBIC) 15 MG tablet, TAKE 1 TABLET BY MOUTH ONCE A DAY WITH FOOD, Disp: 90 tablet, Rfl: 0    metoprolol tartrate (LOPRESSOR) 25 MG tablet, Take 12.5 mg by mouth 2 (two) times daily., Disp: , Rfl: 9    multivitamin with minerals (MULTI-VITAMIN W/MINERALS) Cap, Take 2 capsules by mouth Daily., Disp: , Rfl:     thyroid, pork, (NP THYROID) 90 mg Tab, Take 1 tablet by mouth before breakfast., Disp: , Rfl:       Past medical, surgical, family and social histories have been reviewed today.        Objective:     Vitals:    04/17/20 0821   BP: 112/68   Temp: 98.2 °F (36.8 °C)   TempSrc: Oral   Weight: 78 kg (171 lb 15.3 oz)   Height: 5' 3" (1.6 m)   PainSc: 7           Physical Exam   Constitutional: She is oriented to person, place, and time. She appears well-developed and well-nourished.   Abdominal: Soft. She exhibits no distension. There is tenderness in the suprapubic area. There is no CVA tenderness.   Neurological: She is alert and oriented to person, place, and time.   Psychiatric: She has a normal mood and affect. Her behavior is normal. Judgment and thought content normal.   Vitals reviewed.          Urine dipstick shows positive for nitrites, leukocytes, red blood cells, ketones.        Diagnosis       1. Acute UTI (urinary tract infection)    2. Urinary symptom or sign          Assessment/ Plan     Acute UTI (urinary tract infection) --- u/a positive in patient with urinary complaints x 1 week.  Continue Azo if needed.  Infection precautions.  -     nitrofurantoin, macrocrystal-monohydrate, (MACROBID) 100 MG capsule; Take 1 capsule (100 mg total) by mouth 2 (two) times daily. for 7 days  Dispense: 14 " capsule; Refill: 0    Urinary symptom or sign  -     POCT urine dipstick without microscope  -     nitrofurantoin, macrocrystal-monohydrate, (MACROBID) 100 MG capsule; Take 1 capsule (100 mg total) by mouth 2 (two) times daily. for 7 days  Dispense: 14 capsule; Refill: 0          Follow-up:  Recheck in office in 2 to 3 days if worse or no better.      FAHAD Blackburn  [facility]

## 2020-05-04 ENCOUNTER — PATIENT MESSAGE (OUTPATIENT)
Dept: GASTROENTEROLOGY | Facility: CLINIC | Age: 65
End: 2020-05-04

## 2020-05-08 ENCOUNTER — PATIENT MESSAGE (OUTPATIENT)
Dept: OPHTHALMOLOGY | Facility: CLINIC | Age: 65
End: 2020-05-08

## 2020-05-08 ENCOUNTER — OFFICE VISIT (OUTPATIENT)
Dept: OPHTHALMOLOGY | Facility: CLINIC | Age: 65
End: 2020-05-08
Payer: COMMERCIAL

## 2020-05-08 ENCOUNTER — PATIENT MESSAGE (OUTPATIENT)
Dept: GASTROENTEROLOGY | Facility: CLINIC | Age: 65
End: 2020-05-08

## 2020-05-08 DIAGNOSIS — M35.01 KERATITIS SICCA, BILATERAL: ICD-10-CM

## 2020-05-08 DIAGNOSIS — H16.002 ULCER OF LEFT CORNEA: Primary | ICD-10-CM

## 2020-05-08 DIAGNOSIS — H40.1131 PRIMARY OPEN ANGLE GLAUCOMA (POAG) OF BOTH EYES, MILD STAGE: ICD-10-CM

## 2020-05-08 PROCEDURE — 99999 PR PBB SHADOW E&M-EST. PATIENT-LVL II: ICD-10-PCS | Mod: PBBFAC,,, | Performed by: OPHTHALMOLOGY

## 2020-05-08 PROCEDURE — 92012 INTRM OPH EXAM EST PATIENT: CPT | Mod: S$GLB,,, | Performed by: OPHTHALMOLOGY

## 2020-05-08 PROCEDURE — 99999 PR PBB SHADOW E&M-EST. PATIENT-LVL II: CPT | Mod: PBBFAC,,, | Performed by: OPHTHALMOLOGY

## 2020-05-08 PROCEDURE — 92012 PR EYE EXAM, EST PATIENT,INTERMED: ICD-10-PCS | Mod: S$GLB,,, | Performed by: OPHTHALMOLOGY

## 2020-05-08 RX ORDER — ERYTHROMYCIN 5 MG/G
OINTMENT OPHTHALMIC
Qty: 1 TUBE | Refills: 3 | Status: SHIPPED | OUTPATIENT
Start: 2020-05-08 | End: 2020-09-22

## 2020-05-08 NOTE — PROGRESS NOTES
HPI     Itchy Eye     Comments: Possible infection               Comments     Pt C/O: For the last week OU has been extremely dry and irritated, OS>OD.    Having a severe FB sensation, itching, burning and constant pain, feels   Restasis is not helping with symptoms.      1. Anatomic variant mac degen  2. Cobblestone od  3. Dry eyes  Punctal plugs ou  4. H/O Lasik  5. H/O Iritis os  HVF 3/18/19  6.poag  7.rce od        Restasis BID OU  Naphcon A QAM OU  Systane Wipes  Latanoprost OU qhs  [place] tears - has not been using  Thera tears - in a bottle - 4-5 times per day  Refresh pm q hs OU          Last edited by Leonel Bosch MD on 5/8/2020  2:26 PM. (History)            Assessment /Plan     For exam results, see Encounter Report.      ICD-10-CM ICD-9-CM    1. Ulcer of left cornea H16.002 370.00    2. Keratitis sicca, bilateral H16.223 370.8    3. Primary open angle glaucoma (POAG) of both eyes, mild stage H40.1131 365.11      365.71        Stop restasis OS  Change to PF thera tears 4- 6 times per day  Latanoprost qd OU ( hold it os for now)  Consider changing to Xiidra in the future once the ulcer is healed.  Return to clinic 1 week

## 2020-05-11 ENCOUNTER — OFFICE VISIT (OUTPATIENT)
Dept: GASTROENTEROLOGY | Facility: CLINIC | Age: 65
End: 2020-05-11
Payer: COMMERCIAL

## 2020-05-11 ENCOUNTER — OFFICE VISIT (OUTPATIENT)
Dept: OPHTHALMOLOGY | Facility: CLINIC | Age: 65
End: 2020-05-11
Payer: COMMERCIAL

## 2020-05-11 DIAGNOSIS — H16.002 ULCER OF LEFT CORNEA: Primary | ICD-10-CM

## 2020-05-11 DIAGNOSIS — M35.01 KERATITIS SICCA, BILATERAL: ICD-10-CM

## 2020-05-11 DIAGNOSIS — K59.04 CHRONIC IDIOPATHIC CONSTIPATION: Primary | ICD-10-CM

## 2020-05-11 DIAGNOSIS — H40.1131 PRIMARY OPEN ANGLE GLAUCOMA (POAG) OF BOTH EYES, MILD STAGE: ICD-10-CM

## 2020-05-11 PROCEDURE — 92012 INTRM OPH EXAM EST PATIENT: CPT | Mod: S$GLB,,, | Performed by: OPHTHALMOLOGY

## 2020-05-11 PROCEDURE — 99213 OFFICE O/P EST LOW 20 MIN: CPT | Mod: 95,,, | Performed by: NURSE PRACTITIONER

## 2020-05-11 PROCEDURE — 99213 PR OFFICE/OUTPT VISIT, EST, LEVL III, 20-29 MIN: ICD-10-PCS | Mod: 95,,, | Performed by: NURSE PRACTITIONER

## 2020-05-11 PROCEDURE — 92012 PR EYE EXAM, EST PATIENT,INTERMED: ICD-10-PCS | Mod: S$GLB,,, | Performed by: OPHTHALMOLOGY

## 2020-05-11 PROCEDURE — 99999 PR PBB SHADOW E&M-EST. PATIENT-LVL II: CPT | Mod: PBBFAC,,, | Performed by: OPHTHALMOLOGY

## 2020-05-11 PROCEDURE — 99999 PR PBB SHADOW E&M-EST. PATIENT-LVL II: ICD-10-PCS | Mod: PBBFAC,,, | Performed by: OPHTHALMOLOGY

## 2020-05-11 NOTE — PROGRESS NOTES
Clinic Follow Up:  [facility] Follow Up Note    Reason for Follow Up:  The encounter diagnosis was Chronic idiopathic constipation.    The patient location is: [place]   The chief complaint leading to consultation is: constipation   Visit type: audiovisual  Total time spent with patient: 15 minutes   Each patient to whom he or she provides medical services by telemedicine is:  (1) informed of the relationship between the physician and patient and the respective role of any other health care provider with respect to management of the patient; and (2) notified that he or she may decline to receive medical services by telemedicine and may withdraw from such care at any time.    Notes:     PCP: Dana Johnson       HPI:  This is a 64 y.o. female here for follow up of the above. She was doing well at her last visit in February 2019 but reports continued constipation despite taking Linzess 145 mcg that started shortly after her visit. She has a bowel movement every 3 days. She has not been taking Linzess lately as she felt it was not helping. She has been taking a fiber supplement which is of only minimal benefit.    Review of Systems   Constitutional: Negative for activity change and appetite change.        As per interval history above   Respiratory: Negative for cough and shortness of breath.    Cardiovascular: Negative for chest pain.   Gastrointestinal: Positive for constipation. Negative for abdominal pain, blood in stool, diarrhea, nausea and vomiting.   Skin: Negative for color change and rash.       Medical History:  Past Medical History:   Diagnosis Date    Chronic constipation     Depression     Dry eyes     Endometriosis of broad ligament     Hypothyroidism     Macular degeneration     Migraine headache     Osteoarthritis of hand     PAF (paroxysmal atrial fibrillation)     Primary open angle glaucoma (POAG) of both eyes, mild stage 4/24/2018    Seasonal allergic rhinitis         Surgical History:   Past Surgical History:   Procedure Laterality Date    CERVICAL CONIZATION   W/ LASER      DILATION AND CURETTAGE OF UTERUS      HYSTERECTOMY      LASIK  2007    LIPOMA RESECTION  2010    right thigh    TOTAL ABDOMINAL HYSTERECTOMY W/ BILATERAL SALPINGOOPHORECTOMY  2008       Family History:   Family History   Problem Relation Age of Onset    Hypertension Mother     Heart disease Mother     Dementia Mother     Diabetes Father     Hypertension Father     Heart disease Father     Heart failure Father     Glaucoma Maternal Uncle     No Known Problems Brother        Social History:   Social History     Tobacco Use    Smoking status: Former Smoker     Last attempt to quit: 1983     Years since quittin.3    Smokeless tobacco: Never Used    Tobacco comment: quit 33 years ago    Substance Use Topics    Alcohol use: Yes     Frequency: Monthly or less     Drinks per session: 1 or 2     Binge frequency: Never     Comment: socially (<1/week)    Drug use: No       Allergies:   Review of patient's allergies indicates:   Allergen Reactions    Climara [estradiol]      Glue on climara patch       Home Medications:  Current Outpatient Medications on File Prior to Visit   Medication Sig Dispense Refill    aspirin (ECOTRIN) 81 MG EC tablet Take 81 mg by mouth once daily.      butalbital-acetaminophen-caffeine -40 mg (FIORICET, ESGIC) -40 mg per tablet Take 1 tablet by mouth every 4 to 6 hours as needed for Headaches. 60 tablet 0    cycloSPORINE (RESTASIS) 0.05 % ophthalmic emulsion Place 0.4 mLs (1 drop total) into both eyes 2 (two) times daily. 62 vial 11    DULoxetine (CYMBALTA) 60 MG capsule TAKE 1 CAPSULE BY MOUTH ONCE A DAY 30 capsule 11    erythromycin (ROMYCIN) ophthalmic ointment Place into the left eye every hour. 1 Tube 3    finasteride (PROPECIA) 1 mg tablet Take 1 mg by mouth every other day.       latanoprost (XALATAN) 0.005 % ophthalmic  solution Place 1 drop into both eyes once daily. 2.5 mL 11    meloxicam (MOBIC) 15 MG tablet TAKE 1 TABLET BY MOUTH ONCE A DAY WITH FOOD 90 tablet 0    metoprolol tartrate (LOPRESSOR) 25 MG tablet Take 12.5 mg by mouth 2 (two) times daily.  9    multivitamin with minerals (MULTI-VITAMIN W/MINERALS) Cap Take 2 capsules by mouth Daily.      thyroid, pork, (NP THYROID) 90 mg Tab Take 1 tablet by mouth before breakfast.       No current facility-administered medications on file prior to visit.        There were no vitals taken for this visit.  There is no height or weight on file to calculate BMI.  Physical Exam   Constitutional: She is oriented to person, place, and time and well-developed, well-nourished, and in no distress. No distress.   HENT:   Head: Normocephalic.   Cardiovascular: Normal rate, regular rhythm and normal heart sounds.   Neurological: She is alert and oriented to person, place, and time. No cranial nerve deficit.   Skin: No rash noted.   Psychiatric: Mood and affect normal.       Labs: Pertinent labs reviewed.  CRC Screening: up to date. Due May 2021    Assessment:  1. Chronic idiopathic constipation        Recommendations:  - increase Linzess dose  -     linaCLOtide (LINZESS) 290 mcg Cap capsule; Take 1 capsule (290 mcg total) by mouth once daily.  Dispense: 30 capsule; Refill: 11    Return to Clinic:  Follow up in about 1 year (around 5/11/2021), or if symptoms worsen or fail to improve.    Thank you for the opportunity to participate in the care of this patient.  MIGUEL Jeronimo

## 2020-05-11 NOTE — PROGRESS NOTES
HPI     Follow-up     Comments: 1 weekm follow up              Comments     Patient states OS is doing much better still not completely healed but   noticed its is improving.      1. Anatomic variant mac degen  2. Cobblestone od  3. Dry eyes  Punctal plugs ou  4. H/O Lasik  5. H/O Iritis os  HVF 3/18/19  6.poag  7.rce od          Naphcon A QAM OU  Systane Wipes  Latanoprost OU qhs(hold in OS for now)  PF Thera Tears 4-6 times a day OU  E-mycin oint every hour OS          Last edited by Miriam Mcgovern, Patient Care Assistant on 5/11/2020  8:15   AM. (History)            Assessment /Plan     For exam results, see Encounter Report.      ICD-10-CM ICD-9-CM    1. Ulcer of left cornea H16.002 370.00 Much better today   2. Keratitis sicca, bilateral H16.223 370.8 Will need to adjust overall therapy once ulcer is fully healed. ( Consider Xiidra)   3. Primary open angle glaucoma (POAG) of both eyes, mild stage H40.1131 365.11 Latanoprost qd OD only for now     365.71          Reduce emycin to q 2 h and then reduce if possible  Return to clinic 5 days

## 2020-05-15 ENCOUNTER — OFFICE VISIT (OUTPATIENT)
Dept: OPHTHALMOLOGY | Facility: CLINIC | Age: 65
End: 2020-05-15
Payer: COMMERCIAL

## 2020-05-15 ENCOUNTER — TELEPHONE (OUTPATIENT)
Dept: RADIOLOGY | Facility: HOSPITAL | Age: 65
End: 2020-05-15

## 2020-05-15 DIAGNOSIS — M35.01 KERATITIS SICCA, BILATERAL: ICD-10-CM

## 2020-05-15 DIAGNOSIS — H16.002 ULCER OF LEFT CORNEA: Primary | ICD-10-CM

## 2020-05-15 DIAGNOSIS — H40.1131 PRIMARY OPEN ANGLE GLAUCOMA (POAG) OF BOTH EYES, MILD STAGE: ICD-10-CM

## 2020-05-15 DIAGNOSIS — H04.129 DRYNESS, EYE: ICD-10-CM

## 2020-05-15 PROCEDURE — 92012 INTRM OPH EXAM EST PATIENT: CPT | Mod: S$GLB,,, | Performed by: OPHTHALMOLOGY

## 2020-05-15 PROCEDURE — 99999 PR PBB SHADOW E&M-EST. PATIENT-LVL II: CPT | Mod: PBBFAC,,, | Performed by: OPHTHALMOLOGY

## 2020-05-15 PROCEDURE — 92012 PR EYE EXAM, EST PATIENT,INTERMED: ICD-10-PCS | Mod: S$GLB,,, | Performed by: OPHTHALMOLOGY

## 2020-05-15 PROCEDURE — 99999 PR PBB SHADOW E&M-EST. PATIENT-LVL II: ICD-10-PCS | Mod: PBBFAC,,, | Performed by: OPHTHALMOLOGY

## 2020-05-15 NOTE — PROGRESS NOTES
HPI     Follow-up     Comments: 5 day follow up              Comments     Patient feels OU is doing much better, not hurting like it was last week   and using drops and ointment as directed.  She states it Feels like its improving     1. Anatomic variant mac degen  2. Cobblestone od  3. Dry eyes  Punctal plugs ou  4. H/O Lasik  5. H/O Iritis os  HVF 3/18/19  6.poag  7.rce od          Naphcon A QAM OU  Systane Wipes  Latanoprost OU qhs(hold in OS for now)  PF Thera Tears 4-6 times a day OU  E-mycin oint every hour OS          Last edited by Leonel Bosch MD on 5/15/2020  9:32 AM. (History)            Assessment /Plan     For exam results, see Encounter Report.      ICD-10-CM ICD-9-CM    1. Ulcer of left cornea H16.002 370.00 Healed today OS, still very dry OS>OD   2. Keratitis sicca, bilateral H16.223 370.8    3. Primary open angle glaucoma (POAG) of both eyes, mild stage H40.1131 365.11      365.71    4. Dryness, eye H04.129 375.15          Naphcon A QAM OU  Systane Wipes  Latanoprost OU qhs(hold in OS for now)   PF Thera Tears 4-6 times a day OU  E-mycin oint every hour OS     RETURN TO CLINIC 10-12 days

## 2020-05-18 ENCOUNTER — HOSPITAL ENCOUNTER (OUTPATIENT)
Dept: RADIOLOGY | Facility: HOSPITAL | Age: 65
Discharge: HOME | End: 2020-05-18
Attending: UROLOGY
Payer: COMMERCIAL

## 2020-05-18 DIAGNOSIS — N28.1 RENAL CYST: ICD-10-CM

## 2020-05-18 DIAGNOSIS — N39.3 SUI (STRESS URINARY INCONTINENCE, FEMALE): ICD-10-CM

## 2020-05-18 PROCEDURE — 76770 US EXAM ABDO BACK WALL COMP: CPT | Mod: 26,,, | Performed by: RADIOLOGY

## 2020-05-18 PROCEDURE — 76770 US EXAM ABDO BACK WALL COMP: CPT | Mod: TC

## 2020-05-18 PROCEDURE — 76770 US RETROPERITONEAL COMPLETE: ICD-10-PCS | Mod: 26,,, | Performed by: RADIOLOGY

## 2020-05-21 ENCOUNTER — OFFICE VISIT (OUTPATIENT)
Dept: UROLOGY | Facility: CLINIC | Age: 65
End: 2020-05-21
Payer: COMMERCIAL

## 2020-05-21 VITALS
DIASTOLIC BLOOD PRESSURE: 90 MMHG | WEIGHT: 173.31 LBS | BODY MASS INDEX: 30.7 KG/M2 | TEMPERATURE: 98 F | SYSTOLIC BLOOD PRESSURE: 162 MMHG

## 2020-05-21 DIAGNOSIS — N28.1 RENAL CYST: Primary | ICD-10-CM

## 2020-05-21 DIAGNOSIS — N39.3 SUI (STRESS URINARY INCONTINENCE, FEMALE): ICD-10-CM

## 2020-05-21 DIAGNOSIS — I10 HYPERTENSION, UNSPECIFIED TYPE: ICD-10-CM

## 2020-05-21 LAB
BILIRUB SERPL-MCNC: NORMAL MG/DL
BLOOD URINE, POC: NORMAL
COLOR, POC UA: YELLOW
GLUCOSE UR QL STRIP: NORMAL
KETONES UR QL STRIP: NORMAL
LEUKOCYTE ESTERASE URINE, POC: NORMAL
NITRITE, POC UA: NORMAL
PH, POC UA: 6
PROTEIN, POC: NORMAL
SPECIFIC GRAVITY, POC UA: 1.01
UROBILINOGEN, POC UA: NORMAL

## 2020-05-21 PROCEDURE — 81002 URINALYSIS NONAUTO W/O SCOPE: CPT | Mod: S$GLB,,, | Performed by: UROLOGY

## 2020-05-21 PROCEDURE — 3077F PR MOST RECENT SYSTOLIC BLOOD PRESSURE >= 140 MM HG: ICD-10-PCS | Mod: CPTII,S$GLB,, | Performed by: UROLOGY

## 2020-05-21 PROCEDURE — 3077F SYST BP >= 140 MM HG: CPT | Mod: CPTII,S$GLB,, | Performed by: UROLOGY

## 2020-05-21 PROCEDURE — 99214 PR OFFICE/OUTPT VISIT, EST, LEVL IV, 30-39 MIN: ICD-10-PCS | Mod: 25,S$GLB,, | Performed by: UROLOGY

## 2020-05-21 PROCEDURE — 3080F PR MOST RECENT DIASTOLIC BLOOD PRESSURE >= 90 MM HG: ICD-10-PCS | Mod: CPTII,S$GLB,, | Performed by: UROLOGY

## 2020-05-21 PROCEDURE — 99999 PR PBB SHADOW E&M-EST. PATIENT-LVL III: ICD-10-PCS | Mod: PBBFAC,,, | Performed by: UROLOGY

## 2020-05-21 PROCEDURE — 81002 POCT URINE DIPSTICK WITHOUT MICROSCOPE: ICD-10-PCS | Mod: S$GLB,,, | Performed by: UROLOGY

## 2020-05-21 PROCEDURE — 3080F DIAST BP >= 90 MM HG: CPT | Mod: CPTII,S$GLB,, | Performed by: UROLOGY

## 2020-05-21 PROCEDURE — 3008F PR BODY MASS INDEX (BMI) DOCUMENTED: ICD-10-PCS | Mod: CPTII,S$GLB,, | Performed by: UROLOGY

## 2020-05-21 PROCEDURE — 3008F BODY MASS INDEX DOCD: CPT | Mod: CPTII,S$GLB,, | Performed by: UROLOGY

## 2020-05-21 PROCEDURE — 99214 OFFICE O/P EST MOD 30 MIN: CPT | Mod: 25,S$GLB,, | Performed by: UROLOGY

## 2020-05-21 PROCEDURE — 99999 PR PBB SHADOW E&M-EST. PATIENT-LVL III: CPT | Mod: PBBFAC,,, | Performed by: UROLOGY

## 2020-05-21 NOTE — PROGRESS NOTES
Chief Complaint: Renal cysts    HPI:   5/21/20: Reviewed history in detail. Feeling fine.  DORY about the same.   Indep assessment of imaging agree with report:  Renal cyst same right kidney.  No time for PFMT her  is having severe complications of multiple maladies.  5/20/19: Reviewed history in detail. Feeling fine, no problems.  Cyst same as before smaller if anything.  Some DORY not needing a pad.  5/17/18: No problems in the interval. Bilateral cysts same.  5/11/17: 62 yo woman followed for 4 years for renal cysts.  Had a pyelonephritis on the way.  Has a 2.5 cm right Adal II cyst that looks stable over many years.    Allergies:  Climara [estradiol]    Medications: has a current medication list which includes the following prescription(s): aspirin, butalbital-acetaminophen-caffeine -40 mg, duloxetine, erythromycin, finasteride, latanoprost, linaclotide, meloxicam, metoprolol tartrate, multivitamin with minerals, thyroid (pork), and cyclosporine.    Review of Systems:  General: No fever, chills, fatigability, or weight loss.  Skin: No rashes, itching, or changes in color or texture of skin.  Chest: Denies NIEVES, cyanosis, wheezing, cough, and sputum production.  Abdomen: Appetite fine. No weight loss. Denies diarrhea, abdominal pain, hematemesis, or blood in stool.  Musculoskeletal: No joint stiffness or swelling. Denies back pain.  : As above.  All other review of systems negative.    PMH:   has a past medical history of Chronic constipation, Depression, Dry eyes, Endometriosis of broad ligament, Hypothyroidism, Macular degeneration, Migraine headache, Osteoarthritis of hand, PAF (paroxysmal atrial fibrillation), Primary open angle glaucoma (POAG) of both eyes, mild stage (4/24/2018), and Seasonal allergic rhinitis.    PSH:   has a past surgical history that includes Lipoma resection (2010); Total abdominal hysterectomy w/ bilateral salpingoophorectomy (March 2008); Cervical conization w/ laser;  Dilation and curettage of uterus; LASIK (2007); and Hysterectomy.    FamHx: family history includes Dementia in her mother; Diabetes in her father; Glaucoma in her maternal uncle; Heart disease in her father and mother; Heart failure in her father; Hypertension in her father and mother; No Known Problems in her brother.    SocHx:  reports that she quit smoking about 37 years ago. She has never used smokeless tobacco. She reports that she drinks alcohol. She reports that she does not use drugs.     Physical Exam:  Vitals:   Vitals:    05/21/20 1336   BP: (!) 162/90   Temp: 98.3 °F (36.8 °C)     General: A&Ox3. No apparent distress. No deformities.  Neck: No masses. Normal thyroid.  Lungs: normal inspiration. No use of accessory muscles.  Heart: normal pulse. No arrhythmias.  Abdomen: Soft. NT. ND  Skin: The skin is warm and dry. No jaundice.  Ext: No c/c/e.  : deferred    Labs/Studies:     Impression/Plan:   1. Unlikely this renal cyst will ever be a problem.  But after discussion again agreed to the reassurance of annual US.  2. PFMT at pt request when ready for DORY  3. HTN: takes her meds; discussed

## 2020-05-25 ENCOUNTER — OFFICE VISIT (OUTPATIENT)
Dept: OPHTHALMOLOGY | Facility: CLINIC | Age: 65
End: 2020-05-25
Payer: COMMERCIAL

## 2020-05-25 DIAGNOSIS — M35.01 KERATITIS SICCA, BILATERAL: ICD-10-CM

## 2020-05-25 DIAGNOSIS — H16.002 ULCER OF LEFT CORNEA: ICD-10-CM

## 2020-05-25 DIAGNOSIS — H40.1131 PRIMARY OPEN ANGLE GLAUCOMA (POAG) OF BOTH EYES, MILD STAGE: Primary | ICD-10-CM

## 2020-05-25 PROCEDURE — 92012 PR EYE EXAM, EST PATIENT,INTERMED: ICD-10-PCS | Mod: S$GLB,,, | Performed by: OPHTHALMOLOGY

## 2020-05-25 PROCEDURE — 99999 PR PBB SHADOW E&M-EST. PATIENT-LVL III: CPT | Mod: PBBFAC,,, | Performed by: OPHTHALMOLOGY

## 2020-05-25 PROCEDURE — 92012 INTRM OPH EXAM EST PATIENT: CPT | Mod: S$GLB,,, | Performed by: OPHTHALMOLOGY

## 2020-05-25 PROCEDURE — 99999 PR PBB SHADOW E&M-EST. PATIENT-LVL III: ICD-10-PCS | Mod: PBBFAC,,, | Performed by: OPHTHALMOLOGY

## 2020-05-25 RX ORDER — BROMFENAC SODIUM 0.81 MG/ML
1 SOLUTION/ DROPS OPHTHALMIC DAILY
Qty: 3 ML | Refills: 1 | Status: SHIPPED | OUTPATIENT
Start: 2020-05-25 | End: 2020-06-01

## 2020-05-25 NOTE — PROGRESS NOTES
HPI     Patient returns for a 10 day ulcer recheck OS, patient states OS  feels   great but OD feels like sand paper and pressure behind.        1. Anatomic variant mac degen  2. Cobblestone od  3. Dry eyes  Punctal plugs ou  4. H/O Lasik  5. H/O Iritis os  HVF 3/18/19  6.poag  7.rce od          Latanoprost OD qhs(hold in OS for now)  PF Thera Tears 4-6 times a day OU  E-mycin OINT DAILY OS    Last edited by LISA Yeh on 5/25/2020  9:02 AM. (History)            Assessment /Plan     For exam results, see Encounter Report.      ICD-10-CM ICD-9-CM    1. Primary open angle glaucoma (POAG) of both eyes, mild stage H40.1131 365.11 IOP not within acceptable range relative to target IOP with risk of irreversible visual loss. Additional treatment required.  Discussed options, risks, and benefits of additional medication, SLT laser, or incisional glaucoma surgery.     recommend SLT     Patient chooses SLT OU     Use Prolensa after laser       Reviewed importance of continued compliance with treatment and follow up.        365.71    2. Keratitis sicca, bilateral H16.223 370.8    3. Ulcer of left cornea H16.002 370.00 improved today, h/o rce- continue dry eye rx        Stop latanoprost now, good response with meds yet pt not tolerating meds well due to dryness

## 2020-05-29 ENCOUNTER — PATIENT MESSAGE (OUTPATIENT)
Dept: OPHTHALMOLOGY | Facility: CLINIC | Age: 65
End: 2020-05-29

## 2020-05-29 ENCOUNTER — TELEPHONE (OUTPATIENT)
Dept: OPHTHALMOLOGY | Facility: CLINIC | Age: 65
End: 2020-05-29

## 2020-06-11 ENCOUNTER — OUTSIDE PLACE OF SERVICE (OUTPATIENT)
Dept: ADMINISTRATIVE | Facility: OTHER | Age: 65
End: 2020-06-11
Payer: COMMERCIAL

## 2020-06-11 PROCEDURE — 65855 PR TRABECULOPLASTY LASER SURGERY: ICD-10-PCS | Mod: 50,,, | Performed by: OPHTHALMOLOGY

## 2020-06-11 PROCEDURE — 65855 TRABECULOPLASTY LASER SURG: CPT | Mod: 50,,, | Performed by: OPHTHALMOLOGY

## 2020-06-30 RX ORDER — LATANOPROST 50 UG/ML
1 SOLUTION/ DROPS OPHTHALMIC NIGHTLY
Qty: 1 BOTTLE | Refills: 4 | Status: SHIPPED | OUTPATIENT
Start: 2020-06-30 | End: 2021-01-12 | Stop reason: SDUPTHER

## 2020-07-06 ENCOUNTER — OFFICE VISIT (OUTPATIENT)
Dept: OPHTHALMOLOGY | Facility: CLINIC | Age: 65
End: 2020-07-06
Payer: COMMERCIAL

## 2020-07-06 DIAGNOSIS — H40.1131 PRIMARY OPEN ANGLE GLAUCOMA (POAG) OF BOTH EYES, MILD STAGE: Primary | ICD-10-CM

## 2020-07-06 PROCEDURE — 99999 PR PBB SHADOW E&M-EST. PATIENT-LVL III: ICD-10-PCS | Mod: PBBFAC,,, | Performed by: OPHTHALMOLOGY

## 2020-07-06 PROCEDURE — 92083 EXTENDED VISUAL FIELD XM: CPT | Mod: S$GLB,,, | Performed by: OPHTHALMOLOGY

## 2020-07-06 PROCEDURE — 99999 PR PBB SHADOW E&M-EST. PATIENT-LVL III: CPT | Mod: PBBFAC,,, | Performed by: OPHTHALMOLOGY

## 2020-07-06 PROCEDURE — 92012 INTRM OPH EXAM EST PATIENT: CPT | Mod: S$GLB,,, | Performed by: OPHTHALMOLOGY

## 2020-07-06 PROCEDURE — 92012 PR EYE EXAM, EST PATIENT,INTERMED: ICD-10-PCS | Mod: S$GLB,,, | Performed by: OPHTHALMOLOGY

## 2020-07-06 PROCEDURE — 92083 HUMPHREY VISUAL FIELD - OU - BOTH EYES: ICD-10-PCS | Mod: S$GLB,,, | Performed by: OPHTHALMOLOGY

## 2020-07-06 NOTE — PROGRESS NOTES
===============================  Yolis Bourgeois,  7/6/2020 today   64 y.o. female   Last visit JCC: :3/6/2020   Last visit eye dept. 5/15/2020  [facility]:  Uncorrected distance visual acuity was 20/30 in the right eye and 20/40 +1 in the left eye.  Tonometry     Tonometry (Applanation, 11:33 AM)       Right Left    Pressure 15 15               Not recorded         Not recorded         Not recorded        Chief Complaint   Patient presents with    Dry Eye     HVF review and dry eye check. pt states her eyes feel better since getting the SLT with Hiro. using latanprost at night. restasis twice a day.     Ophthalmic Medications     Ophthalmic-Intraocular Pressure Reducing Agents, Prostaglandin Analogs Start End     latanoprost 0.005 % ophthalmic solution    6/30/2020 6/30/2021    Sig: Place 1 drop into both eyes every evening.    Route: Both Eyes        ________________  7/6/2020 today  HPI     Dry Eye      Additional comments: HVF review and dry eye check. pt states her eyes   feel better since getting the SLT with Hiro. using latanprost at night.   restasis twice a day.              Comments     1. Anatomic variant mac degen  2. Cobblestone od  3. Dry eyes  Punctal plugs ou  4. H/O Lasik  5. H/O Iritis os  HVF 3/18/19  6.poag  7.rce od  8. SLT OU 6-11-20          Latanoprost OD qhs  PF Thera Tears 4-6 times a day OU  E-mycin OINT DAILY OS          Last edited by Brad Mendez on 7/6/2020 10:20 AM. (History)      Problem List Items Addressed This Visit        Eye/Vision problems    Primary open angle glaucoma (POAG) of both eyes, mild stage - Primary    Relevant Orders    Sung Visual Field - OU - Extended - Both Eyes (Completed)        T 15  15   od  od nasal step    Os os nl  rtc 4 mo    .      ===========================

## 2020-07-08 ENCOUNTER — OFFICE VISIT (OUTPATIENT)
Dept: OPHTHALMOLOGY | Facility: CLINIC | Age: 65
End: 2020-07-08
Payer: COMMERCIAL

## 2020-07-08 DIAGNOSIS — H40.1131 PRIMARY OPEN ANGLE GLAUCOMA (POAG) OF BOTH EYES, MILD STAGE: Primary | ICD-10-CM

## 2020-07-08 DIAGNOSIS — M35.01 KERATITIS SICCA, BILATERAL: ICD-10-CM

## 2020-07-08 DIAGNOSIS — H35.433 PAVING STONE RETINAL DEGENERATION OF BOTH EYES: ICD-10-CM

## 2020-07-08 PROCEDURE — 99999 PR PBB SHADOW E&M-EST. PATIENT-LVL III: CPT | Mod: PBBFAC,,, | Performed by: OPHTHALMOLOGY

## 2020-07-08 PROCEDURE — 99999 PR PBB SHADOW E&M-EST. PATIENT-LVL III: ICD-10-PCS | Mod: PBBFAC,,, | Performed by: OPHTHALMOLOGY

## 2020-07-08 PROCEDURE — 92012 PR EYE EXAM, EST PATIENT,INTERMED: ICD-10-PCS | Mod: S$GLB,,, | Performed by: OPHTHALMOLOGY

## 2020-07-08 PROCEDURE — 92012 INTRM OPH EXAM EST PATIENT: CPT | Mod: S$GLB,,, | Performed by: OPHTHALMOLOGY

## 2020-07-08 NOTE — PROGRESS NOTES
HPI     Post-op Evaluation     Comments: SLT OU 6/11/20              Comments     The patient states her eyes are doing fine and she denies any ocular pain   at this time.    1. Anatomic variant mac degen  2. Cobblestone od  3. Dry eyes  Punctal plugs ou  4. H/O Lasik  5. H/O Iritis os  HVF 3/18/19  6.poag  7.rce od  8. SLT OU 6-11-20      Latanoprost QHS OU  PF Thera Tears 4-6 times a day OU  E-mycin OINT DAILY OS          Last edited by Gina Leal on 7/8/2020  8:13 AM. (History)            Assessment /Plan     For exam results, see Encounter Report.      ICD-10-CM ICD-9-CM    1. Primary open angle glaucoma (POAG) of both eyes, mild stage  H40.1131 365.11 Follow IOP and pt is tolerating drops well, will continue      365.71    2. Keratitis sicca, bilateral  H16.223 370.8    3. 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

## 2020-09-09 RX ORDER — METHYLPREDNISOLONE 4 MG/1
TABLET ORAL
Qty: 1 PACKAGE | Refills: 0 | Status: SHIPPED | OUTPATIENT
Start: 2020-09-09 | End: 2020-09-22

## 2020-09-22 ENCOUNTER — OFFICE VISIT (OUTPATIENT)
Dept: FAMILY MEDICINE | Facility: CLINIC | Age: 65
End: 2020-09-22
Payer: COMMERCIAL

## 2020-09-22 VITALS
WEIGHT: 179.44 LBS | DIASTOLIC BLOOD PRESSURE: 80 MMHG | SYSTOLIC BLOOD PRESSURE: 130 MMHG | TEMPERATURE: 98 F | HEART RATE: 74 BPM | BODY MASS INDEX: 31.79 KG/M2 | HEIGHT: 63 IN

## 2020-09-22 DIAGNOSIS — J32.9 SINUSITIS, UNSPECIFIED CHRONICITY, UNSPECIFIED LOCATION: Primary | ICD-10-CM

## 2020-09-22 PROCEDURE — 90471 IMMUNIZATION ADMIN: CPT | Mod: S$GLB,,, | Performed by: REGISTERED NURSE

## 2020-09-22 PROCEDURE — 99213 OFFICE O/P EST LOW 20 MIN: CPT | Mod: 25,S$GLB,, | Performed by: REGISTERED NURSE

## 2020-09-22 PROCEDURE — 3008F BODY MASS INDEX DOCD: CPT | Mod: CPTII,S$GLB,, | Performed by: REGISTERED NURSE

## 2020-09-22 PROCEDURE — 3079F PR MOST RECENT DIASTOLIC BLOOD PRESSURE 80-89 MM HG: ICD-10-PCS | Mod: CPTII,S$GLB,, | Performed by: REGISTERED NURSE

## 2020-09-22 PROCEDURE — 3075F PR MOST RECENT SYSTOLIC BLOOD PRESS GE 130-139MM HG: ICD-10-PCS | Mod: CPTII,S$GLB,, | Performed by: REGISTERED NURSE

## 2020-09-22 PROCEDURE — 99213 PR OFFICE/OUTPT VISIT, EST, LEVL III, 20-29 MIN: ICD-10-PCS | Mod: 25,S$GLB,, | Performed by: REGISTERED NURSE

## 2020-09-22 PROCEDURE — 90686 IIV4 VACC NO PRSV 0.5 ML IM: CPT | Mod: S$GLB,,, | Performed by: REGISTERED NURSE

## 2020-09-22 PROCEDURE — 3008F PR BODY MASS INDEX (BMI) DOCUMENTED: ICD-10-PCS | Mod: CPTII,S$GLB,, | Performed by: REGISTERED NURSE

## 2020-09-22 PROCEDURE — 3075F SYST BP GE 130 - 139MM HG: CPT | Mod: CPTII,S$GLB,, | Performed by: REGISTERED NURSE

## 2020-09-22 PROCEDURE — 90686 FLU VACCINE (QUAD) GREATER THAN OR EQUAL TO 3YO PRESERVATIVE FREE IM: ICD-10-PCS | Mod: S$GLB,,, | Performed by: REGISTERED NURSE

## 2020-09-22 PROCEDURE — 99999 PR PBB SHADOW E&M-EST. PATIENT-LVL IV: ICD-10-PCS | Mod: PBBFAC,,, | Performed by: REGISTERED NURSE

## 2020-09-22 PROCEDURE — 99999 PR PBB SHADOW E&M-EST. PATIENT-LVL IV: CPT | Mod: PBBFAC,,, | Performed by: REGISTERED NURSE

## 2020-09-22 PROCEDURE — 90471 FLU VACCINE (QUAD) GREATER THAN OR EQUAL TO 3YO PRESERVATIVE FREE IM: ICD-10-PCS | Mod: S$GLB,,, | Performed by: REGISTERED NURSE

## 2020-09-22 PROCEDURE — 3079F DIAST BP 80-89 MM HG: CPT | Mod: CPTII,S$GLB,, | Performed by: REGISTERED NURSE

## 2020-09-22 RX ORDER — DOXYCYCLINE 100 MG/1
100 CAPSULE ORAL 2 TIMES DAILY
Qty: 20 CAPSULE | Refills: 0 | Status: SHIPPED | OUTPATIENT
Start: 2020-09-22 | End: 2020-10-02

## 2020-09-22 NOTE — PROGRESS NOTES
Subjective:       Patient ID: Yolis Bourgeois is a 64 y.o. female.    Chief Complaint   Patient presents with    Sinusitis       HPI     Mrs. Bourgeois is here today with continued sinus complaints, worsening.  Did complete steroid pack as ordered on 9/9/2020 but reports did not help.  She has had increased sinus pressure, congestion, drainage and left sided facial pain.  More tired lately than usual, right ear bothering her.        Review of Systems   Constitutional: Negative for activity change, chills, fever and unexpected weight change.   HENT: Positive for congestion, ear pain, sinus pressure, sore throat and trouble swallowing. Negative for hearing loss, rhinorrhea and tinnitus.    Eyes: Negative for discharge and visual disturbance.   Respiratory: Negative for cough, shortness of breath and wheezing.    Cardiovascular: Negative for chest pain and leg swelling.   Gastrointestinal: Negative for blood in stool, constipation, diarrhea and vomiting.   Endocrine: Negative for polydipsia and polyuria.   Genitourinary: Negative for difficulty urinating, dysuria, hematuria and menstrual problem.   Musculoskeletal: Negative for arthralgias, joint swelling and neck pain.   Neurological: Positive for headaches. Negative for weakness, light-headedness and numbness.   Psychiatric/Behavioral: Negative for confusion and dysphoric mood.         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 , Disp: , Rfl:     latanoprost 0.005 % ophthalmic solution, Place 1 drop into both eyes every evening., Disp: 1 Bottle, Rfl: 4    linaCLOtide (LINZESS) 290 mcg Cap capsule, Take 1 capsule (290 mcg total) by mouth once daily., Disp: 30 capsule, Rfl: 11    meloxicam (MOBIC) 15 MG tablet, TAKE 1 TABLET BY MOUTH ONCE A DAY WITH FOOD, Disp: 90 tablet, Rfl: 0    metoprolol tartrate (LOPRESSOR) 25 MG tablet, Take 12.5 mg by mouth 2 (two) times daily., Disp: , Rfl: 9    multivitamin with minerals (MULTI-VITAMIN W/MINERALS) Cap, Take 2 capsules by mouth Daily., Disp: , Rfl:     thyroid, pork, (NP THYROID) 90 mg Tab, Take 1 tablet by mouth before breakfast., Disp: , Rfl:         Past medical, surgical, family and social histories have been reviewed today.      Objective:     Vitals:    09/22/20 1351   BP: 130/80   Pulse: 74   Temp: 97.9 °F (36.6 °C)   TempSrc: Oral   Weight: 81.4 kg (179 lb 7.3 oz)   Height: 5' 3" (1.6 m)   PainSc:   3   PainLoc: Head         Physical Exam  Vitals signs reviewed.   Constitutional:       General: She is not in acute distress.     Appearance: She is well-developed.   HENT:      Head: Normocephalic and atraumatic.      Right Ear: Tympanic membrane, ear canal and external ear normal. There is no impacted cerumen.      Left Ear: Tympanic membrane, ear canal and external ear normal. There is no impacted cerumen.      Nose: Mucosal edema, congestion and rhinorrhea present.      Right Sinus: Maxillary sinus tenderness present.      Left Sinus: Maxillary sinus " tenderness present.      Mouth/Throat:      Mouth: Mucous membranes are moist.      Pharynx: Oropharynx is clear. No oropharyngeal exudate or posterior oropharyngeal erythema.   Eyes:      General:         Right eye: No discharge.         Left eye: No discharge.      Conjunctiva/sclera: Conjunctivae normal.      Pupils: Pupils are equal, round, and reactive to light.   Neck:      Musculoskeletal: Normal range of motion and neck supple.   Cardiovascular:      Rate and Rhythm: Normal rate and regular rhythm.      Pulses: Normal pulses.      Heart sounds: Normal heart sounds. No murmur. No gallop.    Pulmonary:      Effort: Pulmonary effort is normal.      Breath sounds: Normal breath sounds.   Musculoskeletal: Normal range of motion.   Lymphadenopathy:      Cervical: No cervical adenopathy.   Skin:     General: Skin is warm and dry.      Capillary Refill: Capillary refill takes less than 2 seconds.      Findings: No rash.   Neurological:      Mental Status: She is alert and oriented to person, place, and time.   Psychiatric:         Mood and Affect: Mood normal.         Behavior: Behavior normal.         Thought Content: Thought content normal.         Judgment: Judgment normal.           Diagnosis       1. Sinusitis, unspecified chronicity, unspecified location          Assessment/ Plan     Sinusitis, unspecified chronicity, unspecified location --- symptomatic care, rest and fluids.  -     doxycycline (MONODOX) 100 MG capsule; Take 1 capsule (100 mg total) by mouth 2 (two) times daily. for 10 days  Dispense: 20 capsule; Refill: 0    Other orders  -     Influenza - Quadrivalent (PF)        Follow-up:  Return or contact office back in 2 to 3 days if worse or no better.  Otherwise, return prn.        FAHAD Blackburn  [facility]

## 2020-11-05 ENCOUNTER — PATIENT OUTREACH (OUTPATIENT)
Dept: ADMINISTRATIVE | Facility: OTHER | Age: 65
End: 2020-11-05

## 2020-11-06 ENCOUNTER — OFFICE VISIT (OUTPATIENT)
Dept: OPHTHALMOLOGY | Facility: CLINIC | Age: 65
End: 2020-11-06
Payer: MEDICARE

## 2020-11-06 DIAGNOSIS — H35.433 PAVING STONE RETINAL DEGENERATION OF BOTH EYES: Primary | ICD-10-CM

## 2020-11-06 DIAGNOSIS — H40.1131 PRIMARY OPEN ANGLE GLAUCOMA (POAG) OF BOTH EYES, MILD STAGE: ICD-10-CM

## 2020-11-06 PROCEDURE — 99213 OFFICE O/P EST LOW 20 MIN: CPT | Mod: PBBFAC | Performed by: OPHTHALMOLOGY

## 2020-11-06 PROCEDURE — 99999 PR PBB SHADOW E&M-EST. PATIENT-LVL III: ICD-10-PCS | Mod: PBBFAC,,, | Performed by: OPHTHALMOLOGY

## 2020-11-06 PROCEDURE — 92014 COMPRE OPH EXAM EST PT 1/>: CPT | Mod: S$PBB,,, | Performed by: OPHTHALMOLOGY

## 2020-11-06 PROCEDURE — 99999 PR PBB SHADOW E&M-EST. PATIENT-LVL III: CPT | Mod: PBBFAC,,, | Performed by: OPHTHALMOLOGY

## 2020-11-06 PROCEDURE — 92014 PR EYE EXAM, EST PATIENT,COMPREHESV: ICD-10-PCS | Mod: S$PBB,,, | Performed by: OPHTHALMOLOGY

## 2020-11-06 NOTE — PROGRESS NOTES
Health Maintenance Due   Topic Date Due    HIV Screening  10/09/1970    DEXA SCAN  10/09/1995    Shingles Vaccine (2 of 3) 12/25/2015    Pneumococcal Vaccine (65+ Low/Medium Risk) (1 of 2 - PCV13) 10/09/2020    Mammogram  12/23/2020     Updates were requested from care everywhere.  Chart was reviewed for overdue Proactive [facility] Encounters (TI) topics (CRS, Breast Cancer Screening, Eye exam)  Health Maintenance has been updated.  LINKS immunization registry triggered.  Immunizations were reconciled.

## 2020-11-06 NOTE — PROGRESS NOTES
===============================  Yolis Bourgeois,  11/6/2020 today   65 y.o. female   Last visit [facility]: :7/6/2020   Last visit eye dept. 7/8/2020  [facility]:  Uncorrected distance visual acuity was 20/25 +1 in the right eye and 20/25 in the left eye.  Tonometry     Tonometry (Applanation, 9:46 AM)       Right Left    Pressure 19 18               Not recorded         Not recorded         Not recorded        Chief Complaint   Patient presents with    Glaucoma     4 m iop check     Ophthalmic Medications     Ophthalmic-Intraocular Pressure Reducing Agents, Prostaglandin Analogs Start End     latanoprost 0.005 % ophthalmic solution    6/30/2020 6/30/2021    Sig: Place 1 drop into both eyes every evening.    Route: Both Eyes        ________________  11/6/2020 today  HPI     Glaucoma      Additional comments: 4 m iop check              Comments     Pt states she is using gtts as directed. Pt co va fluctuating ou    1. Anatomic variant mac degen  2. Cobblestone od  3. Dry eyes  Punctal plugs ou  4. H/O Lasik  5. H/O Iritis os  HVF 3/18/19  6.poag  7.rce od  8. SLT OU 6-11-20      Latanoprost QHS OU  PF Thera Tears 4-6 times a day OU  E-mycin OINT DAILY OS- pt is finished 11/6/2020          Last edited by Eduardo Patel on 11/6/2020  9:31 AM. 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 Bosch   today T 19 18  rtc 3-4 months       .      ===========================

## 2020-11-11 ENCOUNTER — PATIENT MESSAGE (OUTPATIENT)
Dept: FAMILY MEDICINE | Facility: CLINIC | Age: 65
End: 2020-11-11

## 2020-11-11 RX ORDER — BUTALBITAL, ACETAMINOPHEN AND CAFFEINE 50; 325; 40 MG/1; MG/1; MG/1
1 TABLET ORAL
Qty: 60 TABLET | Refills: 0 | Status: SHIPPED | OUTPATIENT
Start: 2020-11-11 | End: 2021-03-10 | Stop reason: SDUPTHER

## 2020-11-16 ENCOUNTER — TELEPHONE (OUTPATIENT)
Dept: FAMILY MEDICINE | Facility: CLINIC | Age: 65
End: 2020-11-16

## 2020-11-16 NOTE — TELEPHONE ENCOUNTER
----- Message from Trinity Richey sent at 11/16/2020  1:06 PM CST -----  Contact: Pt  Type:  Patient Returning Call    Who Called: pt  Who Left Message for Patient:nurse   Does the patient know what this is regarding?:fax from the insurance co   Would the patient rather a call back or a response via [facility]? Phone or [facility]   Best Call Back Number: [phone]  Additional Information:

## 2020-11-17 ENCOUNTER — PATIENT MESSAGE (OUTPATIENT)
Dept: FAMILY MEDICINE | Facility: CLINIC | Age: 65
End: 2020-11-17

## 2020-12-04 ENCOUNTER — PATIENT MESSAGE (OUTPATIENT)
Dept: FAMILY MEDICINE | Facility: CLINIC | Age: 65
End: 2020-12-04

## 2020-12-07 NOTE — TELEPHONE ENCOUNTER
Called pt and advised her that James put in Mammo orders.  Pt stated she was doing better with the nerve pain.  Also advised pt that is was time for her annual with Dr. Johnson.  Pt understood.

## 2020-12-08 ENCOUNTER — PATIENT MESSAGE (OUTPATIENT)
Dept: OPHTHALMOLOGY | Facility: CLINIC | Age: 65
End: 2020-12-08

## 2020-12-08 ENCOUNTER — PATIENT MESSAGE (OUTPATIENT)
Dept: FAMILY MEDICINE | Facility: CLINIC | Age: 65
End: 2020-12-08

## 2020-12-08 RX ORDER — CYCLOSPORINE 0.5 MG/ML
1 EMULSION OPHTHALMIC 2 TIMES DAILY
Qty: 60 VIAL | Refills: 12 | Status: SHIPPED | OUTPATIENT
Start: 2020-12-08 | End: 2022-05-22 | Stop reason: SDUPTHER

## 2020-12-17 ENCOUNTER — OFFICE VISIT (OUTPATIENT)
Dept: FAMILY MEDICINE | Facility: CLINIC | Age: 65
End: 2020-12-17
Payer: MEDICARE

## 2020-12-17 VITALS
TEMPERATURE: 97 F | OXYGEN SATURATION: 98 % | WEIGHT: 177.69 LBS | HEIGHT: 63 IN | BODY MASS INDEX: 31.48 KG/M2 | DIASTOLIC BLOOD PRESSURE: 84 MMHG | SYSTOLIC BLOOD PRESSURE: 134 MMHG | HEART RATE: 89 BPM

## 2020-12-17 DIAGNOSIS — Z00.00 PREVENTATIVE HEALTH CARE: Primary | ICD-10-CM

## 2020-12-17 DIAGNOSIS — F43.22 ADJUSTMENT DISORDER WITH ANXIOUS MOOD: Chronic | ICD-10-CM

## 2020-12-17 DIAGNOSIS — E03.9 ACQUIRED HYPOTHYROIDISM: Chronic | ICD-10-CM

## 2020-12-17 DIAGNOSIS — Z78.0 POSTMENOPAUSAL: ICD-10-CM

## 2020-12-17 DIAGNOSIS — G57.11 MERALGIA PARAESTHETICA, RIGHT: ICD-10-CM

## 2020-12-17 DIAGNOSIS — I48.0 PAROXYSMAL ATRIAL FIBRILLATION: Chronic | ICD-10-CM

## 2020-12-17 DIAGNOSIS — Z12.31 ENCOUNTER FOR SCREENING MAMMOGRAM FOR MALIGNANT NEOPLASM OF BREAST: ICD-10-CM

## 2020-12-17 DIAGNOSIS — Z13.220 ENCOUNTER FOR LIPID SCREENING FOR CARDIOVASCULAR DISEASE: ICD-10-CM

## 2020-12-17 DIAGNOSIS — Z23 NEED FOR VACCINATION: ICD-10-CM

## 2020-12-17 DIAGNOSIS — Z13.6 ENCOUNTER FOR LIPID SCREENING FOR CARDIOVASCULAR DISEASE: ICD-10-CM

## 2020-12-17 PROCEDURE — 1101F PT FALLS ASSESS-DOCD LE1/YR: CPT | Mod: CPTII,S$GLB,, | Performed by: FAMILY MEDICINE

## 2020-12-17 PROCEDURE — 3288F PR FALLS RISK ASSESSMENT DOCUMENTED: ICD-10-PCS | Mod: CPTII,S$GLB,, | Performed by: FAMILY MEDICINE

## 2020-12-17 PROCEDURE — 3008F BODY MASS INDEX DOCD: CPT | Mod: CPTII,S$GLB,, | Performed by: FAMILY MEDICINE

## 2020-12-17 PROCEDURE — G0009 PNEUMOCOCCAL POLYSACCHARIDE VACCINE 23-VALENT =>2YO SQ IM: ICD-10-PCS | Mod: S$GLB,,, | Performed by: FAMILY MEDICINE

## 2020-12-17 PROCEDURE — 90732 PPSV23 VACC 2 YRS+ SUBQ/IM: CPT | Mod: S$GLB,,, | Performed by: FAMILY MEDICINE

## 2020-12-17 PROCEDURE — 3079F DIAST BP 80-89 MM HG: CPT | Mod: CPTII,S$GLB,, | Performed by: FAMILY MEDICINE

## 2020-12-17 PROCEDURE — 3079F PR MOST RECENT DIASTOLIC BLOOD PRESSURE 80-89 MM HG: ICD-10-PCS | Mod: CPTII,S$GLB,, | Performed by: FAMILY MEDICINE

## 2020-12-17 PROCEDURE — 1125F AMNT PAIN NOTED PAIN PRSNT: CPT | Mod: S$GLB,,, | Performed by: FAMILY MEDICINE

## 2020-12-17 PROCEDURE — 3075F PR MOST RECENT SYSTOLIC BLOOD PRESS GE 130-139MM HG: ICD-10-PCS | Mod: CPTII,S$GLB,, | Performed by: FAMILY MEDICINE

## 2020-12-17 PROCEDURE — 99397 PER PM REEVAL EST PAT 65+ YR: CPT | Mod: 25,S$GLB,, | Performed by: FAMILY MEDICINE

## 2020-12-17 PROCEDURE — 3288F FALL RISK ASSESSMENT DOCD: CPT | Mod: CPTII,S$GLB,, | Performed by: FAMILY MEDICINE

## 2020-12-17 PROCEDURE — 99999 PR PBB SHADOW E&M-EST. PATIENT-LVL IV: CPT | Mod: PBBFAC,,, | Performed by: FAMILY MEDICINE

## 2020-12-17 PROCEDURE — 1101F PR PT FALLS ASSESS DOC 0-1 FALLS W/OUT INJ PAST YR: ICD-10-PCS | Mod: CPTII,S$GLB,, | Performed by: FAMILY MEDICINE

## 2020-12-17 PROCEDURE — 1125F PR PAIN SEVERITY QUANTIFIED, PAIN PRESENT: ICD-10-PCS | Mod: S$GLB,,, | Performed by: FAMILY MEDICINE

## 2020-12-17 PROCEDURE — G0009 ADMIN PNEUMOCOCCAL VACCINE: HCPCS | Mod: S$GLB,,, | Performed by: FAMILY MEDICINE

## 2020-12-17 PROCEDURE — 90732 PNEUMOCOCCAL POLYSACCHARIDE VACCINE 23-VALENT =>2YO SQ IM: ICD-10-PCS | Mod: S$GLB,,, | Performed by: FAMILY MEDICINE

## 2020-12-17 PROCEDURE — 3075F SYST BP GE 130 - 139MM HG: CPT | Mod: CPTII,S$GLB,, | Performed by: FAMILY MEDICINE

## 2020-12-17 PROCEDURE — 3008F PR BODY MASS INDEX (BMI) DOCUMENTED: ICD-10-PCS | Mod: CPTII,S$GLB,, | Performed by: FAMILY MEDICINE

## 2020-12-17 PROCEDURE — 99397 PR PREVENTIVE VISIT,EST,65 & OVER: ICD-10-PCS | Mod: 25,S$GLB,, | Performed by: FAMILY MEDICINE

## 2020-12-17 PROCEDURE — 99999 PR PBB SHADOW E&M-EST. PATIENT-LVL IV: ICD-10-PCS | Mod: PBBFAC,,, | Performed by: FAMILY MEDICINE

## 2020-12-17 NOTE — PROGRESS NOTES
The patient complains of burning pain in anterolateral right thigh which flares up intermittently.  Sometimes pain is itching in quality.

## 2020-12-17 NOTE — PROGRESS NOTES
CHIEF COMPLAINT: This is a 65-year-old female here for preventive health exam.     SUBJECTIVE:  Patient is doing well without complaints except for situational stress related to 's illness.  She is followed by Cardiology for PAF and palpitations for which she takes metoprolol 12.5 mg twice daily. She is taking pork thyroid for hypothyroidism per OB-GYN and finasteride for hair loss.  She's gained 5 pounds in the last year.  She takes meloxicam for joint pain and Linzess for constipation (per GI).  She uses Linzess when needed because frequent use causes diarrhea.  The patient has migraine headaches and takes Fioricet as needed.     The patient complains of burning pain in anterolateral right thigh which flares up intermittently.  Sometimes pain is itching in quality.  She rates the pain 5/10 on the pain scale.  Patient denies crossing legs frequently, wearing tight or binding clothing or recent injury.     Eye exam December 2019.  Mammogram November 2018.  Colonoscopy May 2011, due again in May 2021.  Tdap June 2013.  Flu vaccine September 2020.     ROS:  GENERAL: Patient denies fever, chills, night sweats.  Patient denies weight loss. Patient denies anorexia, fatigue, weakness or swollen glands.  SKIN: Patient denies rash or hair loss.  HEENT: Patient denies sore throat, ear pain, hearing loss, nasal congestion, or runny nose. Patient denies visual disturbance, eye irritation or discharge.  LUNGS: Patient denies cough, wheeze or hemoptysis.  CARDIOVASCULAR: Patient denies chest pain, shortness of breath, palpitations, syncope or lower extremity edema.  GI: Patient denies abdominal pain, nausea, vomiting, diarrhea, constipation, blood in stool or melena.  GENITOURINARY: Patient denies pelvic pain, vaginal discharge, itch or odor. Patient denies irregular vaginal bleeding.  Patient denies dysuria, frequency, hematuria, nocturia, urgency or incontinence.  BREASTS: Patient denies breast pain, mass or nipple  discharge.  MUSCULOSKELETAL: Patient denies joint swelling, redness or warmth.  NEUROLOGIC: Patient denies headache, vertigo, paresthesias, weakness in limb, dysarthria, dysphagia or abnormality of gait.  PSYCHIATRIC: Patient denies anxiety, depression, or memory loss.     OBJECTIVE:   GENERAL: Well-developed well-nourished overweight white female alert and oriented x3, in no acute distress.  Memory, judgment and cognition without deficit.   SKIN: Clear without rash.  Normal color and tone.  HEENT: Eyes: Clear conjunctivae. No scleral icterus.  Pupils equal reactive to light and accommodation.  Ears: Clear canals. Clear TMs.  Nose: Without congestion.  Pharynx: Without injection or exudates.  NECK: Supple, normal range of motion.  No masses, lymphadenopathy or enlarged thyroid.  No JVD.  Carotids 2+ and equal.  No bruits.  LUNGS: Clear to auscultation.  Normal respiratory effort.  CARDIOVASCULAR:  Regular rhythm, normal S1, S2 without murmur, gallop or rub.  BACK:  No CVA or spinal tenderness.  BREASTS:  No masses, tenderness or nipple discharge.  ABDOMEN: Normal appearance.  Active bowel sounds.  Soft, nontender without mass or organomegaly.  No rebound or guarding.  EXTREMITIES: Without cyanosis, clubbing or edema.  Distal pulses 2+ and equal.  Normal range of motion in all extremities.  No joint effusion, erythema or warmth.  NEUROLOGIC:   Cranial nerves II through XII without deficit.  Motor strength equal bilaterally.  Sensation normal to touch.  Deep tendon reflexes 2+ and equal.  Gait without abnormality.  No tremor.  Negative cerebellar signs.  PELVIC: Deferred.  SELVIN: No masses, nontender.  Heme-negative stool ×2.    ASSESSMENT:  1. Preventative health care    2. Acquired hypothyroidism    3. Paroxysmal atrial fibrillation    4. Adjustment disorder with anxious mood    5. Meralgia paraesthetica, right    6. Encounter for screening mammogram for malignant neoplasm of breast    7. Postmenopausal    8.  Encounter for lipid screening for cardiovascular disease    9. Need for vaccination        PLAN:  1.  Weight reduction. Exercise regularly.  2.  Age-appropriate counseling.  3.  Fasting lab.  4.  Mammogram.  5.  Bone DEXA scan.  6.  Pneumovax.  7.  Refill medications as needed.  8.  Discussed pathophysiology of meralgia paresthetica.  Avoid crossing legs and wearing tighter binding clothing.  9.  Take ibuprofen 600-800 mg 3 times daily.  10.  Consider gabapentin or Lyrica if no improvement.  11.  Follow-up annually.    This note is generated with speech recognition software and is subject to transcription error and sound alike phrases that may be missed by proofreading.

## 2020-12-18 ENCOUNTER — LAB VISIT (OUTPATIENT)
Dept: LAB | Facility: HOSPITAL | Age: 65
End: 2020-12-18
Attending: FAMILY MEDICINE
Payer: MEDICARE

## 2020-12-18 DIAGNOSIS — Z00.00 PREVENTATIVE HEALTH CARE: ICD-10-CM

## 2020-12-18 DIAGNOSIS — E03.9 ACQUIRED HYPOTHYROIDISM: Chronic | ICD-10-CM

## 2020-12-18 DIAGNOSIS — I48.0 PAROXYSMAL ATRIAL FIBRILLATION: Chronic | ICD-10-CM

## 2020-12-18 DIAGNOSIS — Z13.220 ENCOUNTER FOR LIPID SCREENING FOR CARDIOVASCULAR DISEASE: ICD-10-CM

## 2020-12-18 DIAGNOSIS — Z13.6 ENCOUNTER FOR LIPID SCREENING FOR CARDIOVASCULAR DISEASE: ICD-10-CM

## 2020-12-18 LAB
ALBUMIN SERPL BCP-MCNC: 3.9 G/DL (ref 3.5–5.2)
ALP SERPL-CCNC: 70 U/L (ref 55–135)
ALT SERPL W/O P-5'-P-CCNC: 23 U/L (ref 10–44)
ANION GAP SERPL CALC-SCNC: 8 MMOL/L (ref 8–16)
AST SERPL-CCNC: 21 U/L (ref 10–40)
BASOPHILS # BLD AUTO: 0.04 K/UL (ref 0–0.2)
BASOPHILS NFR BLD: 0.5 % (ref 0–1.9)
BILIRUB SERPL-MCNC: 0.3 MG/DL (ref 0.1–1)
BUN SERPL-MCNC: 22 MG/DL (ref 8–23)
CALCIUM SERPL-MCNC: 9.6 MG/DL (ref 8.7–10.5)
CHLORIDE SERPL-SCNC: 106 MMOL/L (ref 95–110)
CHOLEST SERPL-MCNC: 208 MG/DL (ref 120–199)
CHOLEST/HDLC SERPL: 3.5 {RATIO} (ref 2–5)
CO2 SERPL-SCNC: 23 MMOL/L (ref 23–29)
CREAT SERPL-MCNC: 0.7 MG/DL (ref 0.5–1.4)
DIFFERENTIAL METHOD: ABNORMAL
EOSINOPHIL # BLD AUTO: 0.1 K/UL (ref 0–0.5)
EOSINOPHIL NFR BLD: 1.3 % (ref 0–8)
ERYTHROCYTE [DISTWIDTH] IN BLOOD BY AUTOMATED COUNT: 12.1 % (ref 11.5–14.5)
EST. GFR  (AFRICAN AMERICAN): >60 ML/MIN/1.73 M^2
EST. GFR  (NON AFRICAN AMERICAN): >60 ML/MIN/1.73 M^2
GLUCOSE SERPL-MCNC: 98 MG/DL (ref 70–110)
HCT VFR BLD AUTO: 43.2 % (ref 37–48.5)
HDLC SERPL-MCNC: 59 MG/DL (ref 40–75)
HDLC SERPL: 28.4 % (ref 20–50)
HGB BLD-MCNC: 13.5 G/DL (ref 12–16)
IMM GRANULOCYTES # BLD AUTO: 0.03 K/UL (ref 0–0.04)
IMM GRANULOCYTES NFR BLD AUTO: 0.4 % (ref 0–0.5)
LDLC SERPL CALC-MCNC: 134.4 MG/DL (ref 63–159)
LYMPHOCYTES # BLD AUTO: 2.1 K/UL (ref 1–4.8)
LYMPHOCYTES NFR BLD: 24.9 % (ref 18–48)
MCH RBC QN AUTO: 30.4 PG (ref 27–31)
MCHC RBC AUTO-ENTMCNC: 31.3 G/DL (ref 32–36)
MCV RBC AUTO: 97 FL (ref 82–98)
MONOCYTES # BLD AUTO: 0.7 K/UL (ref 0.3–1)
MONOCYTES NFR BLD: 8.1 % (ref 4–15)
NEUTROPHILS # BLD AUTO: 5.5 K/UL (ref 1.8–7.7)
NEUTROPHILS NFR BLD: 64.8 % (ref 38–73)
NONHDLC SERPL-MCNC: 149 MG/DL
NRBC BLD-RTO: 0 /100 WBC
PLATELET # BLD AUTO: 303 K/UL (ref 150–350)
PMV BLD AUTO: 9.3 FL (ref 9.2–12.9)
POTASSIUM SERPL-SCNC: 4.4 MMOL/L (ref 3.5–5.1)
PROT SERPL-MCNC: 7.2 G/DL (ref 6–8.4)
RBC # BLD AUTO: 4.44 M/UL (ref 4–5.4)
SODIUM SERPL-SCNC: 137 MMOL/L (ref 136–145)
T4 FREE SERPL-MCNC: 0.76 NG/DL (ref 0.71–1.51)
TRIGL SERPL-MCNC: 73 MG/DL (ref 30–150)
TSH SERPL DL<=0.005 MIU/L-ACNC: 0.02 UIU/ML (ref 0.4–4)
WBC # BLD AUTO: 8.4 K/UL (ref 3.9–12.7)

## 2020-12-18 PROCEDURE — 86703 HIV-1/HIV-2 1 RESULT ANTBDY: CPT

## 2020-12-18 PROCEDURE — 80061 LIPID PANEL: CPT

## 2020-12-18 PROCEDURE — 84443 ASSAY THYROID STIM HORMONE: CPT

## 2020-12-18 PROCEDURE — 80053 COMPREHEN METABOLIC PANEL: CPT

## 2020-12-18 PROCEDURE — 85025 COMPLETE CBC W/AUTO DIFF WBC: CPT

## 2020-12-18 PROCEDURE — 84439 ASSAY OF FREE THYROXINE: CPT

## 2020-12-18 PROCEDURE — 36415 COLL VENOUS BLD VENIPUNCTURE: CPT | Mod: PO

## 2020-12-21 LAB — HIV 1+2 AB+HIV1 P24 AG SERPL QL IA: NEGATIVE

## 2021-01-07 ENCOUNTER — HOSPITAL ENCOUNTER (OUTPATIENT)
Dept: RADIOLOGY | Facility: HOSPITAL | Age: 66
Discharge: HOME | End: 2021-01-07
Attending: FAMILY MEDICINE
Payer: MEDICARE

## 2021-01-07 VITALS — WEIGHT: 176.38 LBS | HEIGHT: 63 IN | BODY MASS INDEX: 31.25 KG/M2

## 2021-01-07 DIAGNOSIS — Z12.31 ENCOUNTER FOR SCREENING MAMMOGRAM FOR MALIGNANT NEOPLASM OF BREAST: ICD-10-CM

## 2021-01-07 PROCEDURE — 77067 SCR MAMMO BI INCL CAD: CPT | Mod: 26,,, | Performed by: RADIOLOGY

## 2021-01-07 PROCEDURE — 77063 MAMMO DIGITAL SCREENING BILAT WITH TOMO: ICD-10-PCS | Mod: 26,,, | Performed by: RADIOLOGY

## 2021-01-07 PROCEDURE — 77063 BREAST TOMOSYNTHESIS BI: CPT | Mod: 26,,, | Performed by: RADIOLOGY

## 2021-01-07 PROCEDURE — 77067 SCR MAMMO BI INCL CAD: CPT | Mod: TC

## 2021-01-07 PROCEDURE — 77067 MAMMO DIGITAL SCREENING BILAT WITH TOMO: ICD-10-PCS | Mod: 26,,, | Performed by: RADIOLOGY

## 2021-01-12 RX ORDER — LATANOPROST 50 UG/ML
1 SOLUTION/ DROPS OPHTHALMIC NIGHTLY
Qty: 1 BOTTLE | Refills: 6 | Status: SHIPPED | OUTPATIENT
Start: 2021-01-12 | End: 2021-11-01 | Stop reason: SDUPTHER

## 2021-03-08 RX ORDER — MELOXICAM 15 MG/1
TABLET ORAL
Qty: 90 TABLET | Refills: 0 | Status: SHIPPED | OUTPATIENT
Start: 2021-03-08 | End: 2021-07-09

## 2021-03-10 ENCOUNTER — PATIENT MESSAGE (OUTPATIENT)
Dept: FAMILY MEDICINE | Facility: CLINIC | Age: 66
End: 2021-03-10

## 2021-03-10 ENCOUNTER — PATIENT MESSAGE (OUTPATIENT)
Dept: OPHTHALMOLOGY | Facility: CLINIC | Age: 66
End: 2021-03-10

## 2021-03-10 RX ORDER — BUTALBITAL, ACETAMINOPHEN AND CAFFEINE 50; 325; 40 MG/1; MG/1; MG/1
1 TABLET ORAL
Qty: 60 TABLET | Refills: 0 | Status: SHIPPED | OUTPATIENT
Start: 2021-03-10 | End: 2021-05-24 | Stop reason: SDUPTHER

## 2021-03-30 ENCOUNTER — OFFICE VISIT (OUTPATIENT)
Dept: OPHTHALMOLOGY | Facility: CLINIC | Age: 66
End: 2021-03-30
Payer: MEDICARE

## 2021-03-30 DIAGNOSIS — M35.01 KERATITIS SICCA, BILATERAL: ICD-10-CM

## 2021-03-30 DIAGNOSIS — H40.1131 PRIMARY OPEN ANGLE GLAUCOMA (POAG) OF BOTH EYES, MILD STAGE: Primary | ICD-10-CM

## 2021-03-30 DIAGNOSIS — H35.433 PAVING STONE RETINAL DEGENERATION OF BOTH EYES: ICD-10-CM

## 2021-03-30 PROCEDURE — 92012 INTRM OPH EXAM EST PATIENT: CPT | Mod: S$GLB,,, | Performed by: OPHTHALMOLOGY

## 2021-03-30 PROCEDURE — 92133 POSTERIOR SEGMENT OCT OPTIC NERVE(OCULAR COHERENCE TOMOGRAPHY) - OU - BOTH EYES: ICD-10-PCS | Mod: S$GLB,,, | Performed by: OPHTHALMOLOGY

## 2021-03-30 PROCEDURE — 99999 PR PBB SHADOW E&M-EST. PATIENT-LVL II: CPT | Mod: PBBFAC,,, | Performed by: OPHTHALMOLOGY

## 2021-03-30 PROCEDURE — 99999 PR PBB SHADOW E&M-EST. PATIENT-LVL II: ICD-10-PCS | Mod: PBBFAC,,, | Performed by: OPHTHALMOLOGY

## 2021-03-30 PROCEDURE — 92133 CPTRZD OPH DX IMG PST SGM ON: CPT | Mod: S$GLB,,, | Performed by: OPHTHALMOLOGY

## 2021-03-30 PROCEDURE — 92012 PR EYE EXAM, EST PATIENT,INTERMED: ICD-10-PCS | Mod: S$GLB,,, | Performed by: OPHTHALMOLOGY

## 2021-04-15 ENCOUNTER — TELEPHONE (OUTPATIENT)
Dept: UROLOGY | Facility: CLINIC | Age: 66
End: 2021-04-15

## 2021-05-07 ENCOUNTER — PATIENT MESSAGE (OUTPATIENT)
Dept: FAMILY MEDICINE | Facility: CLINIC | Age: 66
End: 2021-05-07

## 2021-05-07 RX ORDER — ALPRAZOLAM 0.25 MG/1
0.25 TABLET ORAL 2 TIMES DAILY PRN
Qty: 30 TABLET | Refills: 0 | Status: SHIPPED | OUTPATIENT
Start: 2021-05-07 | End: 2021-11-30

## 2021-05-19 ENCOUNTER — HOSPITAL ENCOUNTER (OUTPATIENT)
Dept: RADIOLOGY | Facility: HOSPITAL | Age: 66
Discharge: HOME | End: 2021-05-19
Attending: UROLOGY
Payer: MEDICARE

## 2021-05-19 DIAGNOSIS — I10 HYPERTENSION, UNSPECIFIED TYPE: ICD-10-CM

## 2021-05-19 DIAGNOSIS — N39.3 SUI (STRESS URINARY INCONTINENCE, FEMALE): ICD-10-CM

## 2021-05-19 DIAGNOSIS — N28.1 RENAL CYST: ICD-10-CM

## 2021-05-19 PROCEDURE — 76770 US EXAM ABDO BACK WALL COMP: CPT | Mod: TC

## 2021-05-19 PROCEDURE — 76770 US RETROPERITONEAL COMPLETE: ICD-10-PCS | Mod: 26,,, | Performed by: RADIOLOGY

## 2021-05-19 PROCEDURE — 76770 US EXAM ABDO BACK WALL COMP: CPT | Mod: 26,,, | Performed by: RADIOLOGY

## 2021-05-24 ENCOUNTER — PATIENT OUTREACH (OUTPATIENT)
Dept: ADMINISTRATIVE | Facility: OTHER | Age: 66
End: 2021-05-24

## 2021-05-24 RX ORDER — BUTALBITAL, ACETAMINOPHEN AND CAFFEINE 50; 325; 40 MG/1; MG/1; MG/1
1 TABLET ORAL
Qty: 60 TABLET | Refills: 0 | Status: SHIPPED | OUTPATIENT
Start: 2021-05-24 | End: 2021-07-21 | Stop reason: SDUPTHER

## 2021-05-25 ENCOUNTER — OFFICE VISIT (OUTPATIENT)
Dept: UROLOGY | Facility: CLINIC | Age: 66
End: 2021-05-25
Payer: MEDICARE

## 2021-05-25 VITALS
DIASTOLIC BLOOD PRESSURE: 80 MMHG | SYSTOLIC BLOOD PRESSURE: 162 MMHG | WEIGHT: 185.44 LBS | TEMPERATURE: 98 F | BODY MASS INDEX: 32.84 KG/M2

## 2021-05-25 DIAGNOSIS — N28.1 RENAL CYST: Primary | ICD-10-CM

## 2021-05-25 PROCEDURE — 1101F PR PT FALLS ASSESS DOC 0-1 FALLS W/OUT INJ PAST YR: ICD-10-PCS | Mod: CPTII,S$GLB,, | Performed by: UROLOGY

## 2021-05-25 PROCEDURE — 3008F PR BODY MASS INDEX (BMI) DOCUMENTED: ICD-10-PCS | Mod: CPTII,S$GLB,, | Performed by: UROLOGY

## 2021-05-25 PROCEDURE — 1126F AMNT PAIN NOTED NONE PRSNT: CPT | Mod: S$GLB,,, | Performed by: UROLOGY

## 2021-05-25 PROCEDURE — 99999 PR PBB SHADOW E&M-EST. PATIENT-LVL IV: ICD-10-PCS | Mod: PBBFAC,,, | Performed by: UROLOGY

## 2021-05-25 PROCEDURE — 99214 OFFICE O/P EST MOD 30 MIN: CPT | Mod: S$GLB,,, | Performed by: UROLOGY

## 2021-05-25 PROCEDURE — 1101F PT FALLS ASSESS-DOCD LE1/YR: CPT | Mod: CPTII,S$GLB,, | Performed by: UROLOGY

## 2021-05-25 PROCEDURE — 3288F PR FALLS RISK ASSESSMENT DOCUMENTED: ICD-10-PCS | Mod: CPTII,S$GLB,, | Performed by: UROLOGY

## 2021-05-25 PROCEDURE — 1126F PR PAIN SEVERITY QUANTIFIED, NO PAIN PRESENT: ICD-10-PCS | Mod: S$GLB,,, | Performed by: UROLOGY

## 2021-05-25 PROCEDURE — 3288F FALL RISK ASSESSMENT DOCD: CPT | Mod: CPTII,S$GLB,, | Performed by: UROLOGY

## 2021-05-25 PROCEDURE — 99214 PR OFFICE/OUTPT VISIT, EST, LEVL IV, 30-39 MIN: ICD-10-PCS | Mod: S$GLB,,, | Performed by: UROLOGY

## 2021-05-25 PROCEDURE — 3008F BODY MASS INDEX DOCD: CPT | Mod: CPTII,S$GLB,, | Performed by: UROLOGY

## 2021-05-25 PROCEDURE — 99999 PR PBB SHADOW E&M-EST. PATIENT-LVL IV: CPT | Mod: PBBFAC,,, | Performed by: UROLOGY

## 2021-05-27 PROBLEM — N28.1 RENAL CYST: Status: ACTIVE | Noted: 2021-05-27

## 2021-06-07 ENCOUNTER — PATIENT MESSAGE (OUTPATIENT)
Dept: GASTROENTEROLOGY | Facility: CLINIC | Age: 66
End: 2021-06-07

## 2021-07-09 ENCOUNTER — HOSPITAL ENCOUNTER (OUTPATIENT)
Dept: RADIOLOGY | Facility: HOSPITAL | Age: 66
Discharge: HOME | End: 2021-07-09
Attending: REGISTERED NURSE
Payer: MEDICARE

## 2021-07-09 ENCOUNTER — OFFICE VISIT (OUTPATIENT)
Dept: FAMILY MEDICINE | Facility: CLINIC | Age: 66
End: 2021-07-09
Payer: MEDICARE

## 2021-07-09 VITALS
BODY MASS INDEX: 32.71 KG/M2 | HEIGHT: 63 IN | RESPIRATION RATE: 18 BRPM | SYSTOLIC BLOOD PRESSURE: 110 MMHG | WEIGHT: 184.63 LBS | DIASTOLIC BLOOD PRESSURE: 70 MMHG | TEMPERATURE: 97 F | HEART RATE: 67 BPM | OXYGEN SATURATION: 96 %

## 2021-07-09 DIAGNOSIS — M19.011 OSTEOARTHRITIS OF RIGHT SHOULDER, UNSPECIFIED OSTEOARTHRITIS TYPE: Primary | ICD-10-CM

## 2021-07-09 DIAGNOSIS — M19.011 OSTEOARTHRITIS OF RIGHT SHOULDER, UNSPECIFIED OSTEOARTHRITIS TYPE: ICD-10-CM

## 2021-07-09 PROCEDURE — 1125F AMNT PAIN NOTED PAIN PRSNT: CPT | Mod: CPTII,S$GLB,, | Performed by: REGISTERED NURSE

## 2021-07-09 PROCEDURE — 3008F PR BODY MASS INDEX (BMI) DOCUMENTED: ICD-10-PCS | Mod: CPTII,S$GLB,, | Performed by: REGISTERED NURSE

## 2021-07-09 PROCEDURE — 1101F PT FALLS ASSESS-DOCD LE1/YR: CPT | Mod: CPTII,S$GLB,, | Performed by: REGISTERED NURSE

## 2021-07-09 PROCEDURE — 73030 X-RAY EXAM OF SHOULDER: CPT | Mod: TC,FY,PO,RT

## 2021-07-09 PROCEDURE — 3078F PR MOST RECENT DIASTOLIC BLOOD PRESSURE < 80 MM HG: ICD-10-PCS | Mod: CPTII,S$GLB,, | Performed by: REGISTERED NURSE

## 2021-07-09 PROCEDURE — 99999 PR PBB SHADOW E&M-EST. PATIENT-LVL IV: ICD-10-PCS | Mod: PBBFAC,,, | Performed by: REGISTERED NURSE

## 2021-07-09 PROCEDURE — 3074F PR MOST RECENT SYSTOLIC BLOOD PRESSURE < 130 MM HG: ICD-10-PCS | Mod: CPTII,S$GLB,, | Performed by: REGISTERED NURSE

## 2021-07-09 PROCEDURE — 99214 OFFICE O/P EST MOD 30 MIN: CPT | Mod: S$GLB,,, | Performed by: REGISTERED NURSE

## 2021-07-09 PROCEDURE — 3074F SYST BP LT 130 MM HG: CPT | Mod: CPTII,S$GLB,, | Performed by: REGISTERED NURSE

## 2021-07-09 PROCEDURE — 3078F DIAST BP <80 MM HG: CPT | Mod: CPTII,S$GLB,, | Performed by: REGISTERED NURSE

## 2021-07-09 PROCEDURE — 73030 XR SHOULDER COMPLETE 2 OR MORE VIEWS RIGHT: ICD-10-PCS | Mod: 26,RT,, | Performed by: RADIOLOGY

## 2021-07-09 PROCEDURE — 3008F BODY MASS INDEX DOCD: CPT | Mod: CPTII,S$GLB,, | Performed by: REGISTERED NURSE

## 2021-07-09 PROCEDURE — 1125F PR PAIN SEVERITY QUANTIFIED, PAIN PRESENT: ICD-10-PCS | Mod: CPTII,S$GLB,, | Performed by: REGISTERED NURSE

## 2021-07-09 PROCEDURE — 1101F PR PT FALLS ASSESS DOC 0-1 FALLS W/OUT INJ PAST YR: ICD-10-PCS | Mod: CPTII,S$GLB,, | Performed by: REGISTERED NURSE

## 2021-07-09 PROCEDURE — 99999 PR PBB SHADOW E&M-EST. PATIENT-LVL IV: CPT | Mod: PBBFAC,,, | Performed by: REGISTERED NURSE

## 2021-07-09 PROCEDURE — 3288F FALL RISK ASSESSMENT DOCD: CPT | Mod: CPTII,S$GLB,, | Performed by: REGISTERED NURSE

## 2021-07-09 PROCEDURE — 99214 PR OFFICE/OUTPT VISIT, EST, LEVL IV, 30-39 MIN: ICD-10-PCS | Mod: S$GLB,,, | Performed by: REGISTERED NURSE

## 2021-07-09 PROCEDURE — 3288F PR FALLS RISK ASSESSMENT DOCUMENTED: ICD-10-PCS | Mod: CPTII,S$GLB,, | Performed by: REGISTERED NURSE

## 2021-07-09 PROCEDURE — 73030 X-RAY EXAM OF SHOULDER: CPT | Mod: 26,RT,, | Performed by: RADIOLOGY

## 2021-07-09 RX ORDER — PIROXICAM 20 MG/1
20 CAPSULE ORAL DAILY PRN
Qty: 30 CAPSULE | Refills: 2 | Status: SHIPPED | OUTPATIENT
Start: 2021-07-09 | End: 2022-10-28

## 2021-07-21 RX ORDER — BUTALBITAL, ACETAMINOPHEN AND CAFFEINE 50; 325; 40 MG/1; MG/1; MG/1
1 TABLET ORAL
Qty: 60 TABLET | Refills: 0 | Status: SHIPPED | OUTPATIENT
Start: 2021-07-21 | End: 2021-10-04 | Stop reason: SDUPTHER

## 2021-08-02 ENCOUNTER — PATIENT OUTREACH (OUTPATIENT)
Dept: ADMINISTRATIVE | Facility: OTHER | Age: 66
End: 2021-08-02

## 2021-08-03 ENCOUNTER — OFFICE VISIT (OUTPATIENT)
Dept: OPHTHALMOLOGY | Facility: CLINIC | Age: 66
End: 2021-08-03
Payer: MEDICARE

## 2021-08-03 DIAGNOSIS — H04.129 DRYNESS, EYE: ICD-10-CM

## 2021-08-03 DIAGNOSIS — H40.1131 PRIMARY OPEN ANGLE GLAUCOMA (POAG) OF BOTH EYES, MILD STAGE: Primary | ICD-10-CM

## 2021-08-03 DIAGNOSIS — M35.01 KERATITIS SICCA, BILATERAL: ICD-10-CM

## 2021-08-03 DIAGNOSIS — H35.433 PAVING STONE RETINAL DEGENERATION OF BOTH EYES: ICD-10-CM

## 2021-08-03 PROCEDURE — 99999 PR PBB SHADOW E&M-EST. PATIENT-LVL II: CPT | Mod: PBBFAC,,, | Performed by: OPHTHALMOLOGY

## 2021-08-03 PROCEDURE — 1159F MED LIST DOCD IN RCRD: CPT | Mod: CPTII,S$GLB,, | Performed by: OPHTHALMOLOGY

## 2021-08-03 PROCEDURE — 1159F PR MEDICATION LIST DOCUMENTED IN MEDICAL RECORD: ICD-10-PCS | Mod: CPTII,S$GLB,, | Performed by: OPHTHALMOLOGY

## 2021-08-03 PROCEDURE — 99213 PR OFFICE/OUTPT VISIT, EST, LEVL III, 20-29 MIN: ICD-10-PCS | Mod: S$GLB,,, | Performed by: OPHTHALMOLOGY

## 2021-08-03 PROCEDURE — 99213 OFFICE O/P EST LOW 20 MIN: CPT | Mod: S$GLB,,, | Performed by: OPHTHALMOLOGY

## 2021-08-03 PROCEDURE — 99999 PR PBB SHADOW E&M-EST. PATIENT-LVL II: ICD-10-PCS | Mod: PBBFAC,,, | Performed by: OPHTHALMOLOGY

## 2021-09-26 ENCOUNTER — PATIENT OUTREACH (OUTPATIENT)
Dept: ADMINISTRATIVE | Facility: OTHER | Age: 66
End: 2021-09-26

## 2021-09-26 DIAGNOSIS — Z12.11 ENCOUNTER FOR FIT (FECAL IMMUNOCHEMICAL TEST) SCREENING: Primary | ICD-10-CM

## 2021-09-27 ENCOUNTER — OFFICE VISIT (OUTPATIENT)
Dept: PODIATRY | Facility: CLINIC | Age: 66
End: 2021-09-27
Payer: MEDICARE

## 2021-09-27 VITALS — BODY MASS INDEX: 32.6 KG/M2 | WEIGHT: 184 LBS | HEIGHT: 63 IN

## 2021-09-27 DIAGNOSIS — L60.0 INGROWN TOENAIL OF RIGHT FOOT: Primary | ICD-10-CM

## 2021-09-27 DIAGNOSIS — L60.9 DISEASE OF NAIL: ICD-10-CM

## 2021-09-27 PROCEDURE — 99203 PR OFFICE/OUTPT VISIT, NEW, LEVL III, 30-44 MIN: ICD-10-PCS | Mod: 25,S$GLB,, | Performed by: PODIATRIST

## 2021-09-27 PROCEDURE — 1160F PR REVIEW ALL MEDS BY PRESCRIBER/CLIN PHARMACIST DOCUMENTED: ICD-10-PCS | Mod: CPTII,S$GLB,, | Performed by: PODIATRIST

## 2021-09-27 PROCEDURE — 99203 OFFICE O/P NEW LOW 30 MIN: CPT | Mod: 25,S$GLB,, | Performed by: PODIATRIST

## 2021-09-27 PROCEDURE — 99999 PR PBB SHADOW E&M-EST. PATIENT-LVL III: CPT | Mod: PBBFAC,,, | Performed by: PODIATRIST

## 2021-09-27 PROCEDURE — 3288F PR FALLS RISK ASSESSMENT DOCUMENTED: ICD-10-PCS | Mod: CPTII,S$GLB,, | Performed by: PODIATRIST

## 2021-09-27 PROCEDURE — 87101 SKIN FUNGI CULTURE: CPT | Performed by: PODIATRIST

## 2021-09-27 PROCEDURE — 99999 PR PBB SHADOW E&M-EST. PATIENT-LVL III: ICD-10-PCS | Mod: PBBFAC,,, | Performed by: PODIATRIST

## 2021-09-27 PROCEDURE — 1160F RVW MEDS BY RX/DR IN RCRD: CPT | Mod: CPTII,S$GLB,, | Performed by: PODIATRIST

## 2021-09-27 PROCEDURE — 1101F PR PT FALLS ASSESS DOC 0-1 FALLS W/OUT INJ PAST YR: ICD-10-PCS | Mod: CPTII,S$GLB,, | Performed by: PODIATRIST

## 2021-09-27 PROCEDURE — 87107 FUNGI IDENTIFICATION MOLD: CPT | Performed by: PODIATRIST

## 2021-09-27 PROCEDURE — 11750 EXCISION NAIL&NAIL MATRIX: CPT | Mod: T5,S$GLB,, | Performed by: PODIATRIST

## 2021-09-27 PROCEDURE — 3008F BODY MASS INDEX DOCD: CPT | Mod: CPTII,S$GLB,, | Performed by: PODIATRIST

## 2021-09-27 PROCEDURE — 3008F PR BODY MASS INDEX (BMI) DOCUMENTED: ICD-10-PCS | Mod: CPTII,S$GLB,, | Performed by: PODIATRIST

## 2021-09-27 PROCEDURE — 3288F FALL RISK ASSESSMENT DOCD: CPT | Mod: CPTII,S$GLB,, | Performed by: PODIATRIST

## 2021-09-27 PROCEDURE — 11750 PR REMOVAL OF NAIL BED: ICD-10-PCS | Mod: T5,S$GLB,, | Performed by: PODIATRIST

## 2021-09-27 PROCEDURE — 1159F MED LIST DOCD IN RCRD: CPT | Mod: CPTII,S$GLB,, | Performed by: PODIATRIST

## 2021-09-27 PROCEDURE — 1101F PT FALLS ASSESS-DOCD LE1/YR: CPT | Mod: CPTII,S$GLB,, | Performed by: PODIATRIST

## 2021-09-27 PROCEDURE — 1159F PR MEDICATION LIST DOCUMENTED IN MEDICAL RECORD: ICD-10-PCS | Mod: CPTII,S$GLB,, | Performed by: PODIATRIST

## 2021-10-01 ENCOUNTER — PATIENT MESSAGE (OUTPATIENT)
Dept: PODIATRY | Facility: CLINIC | Age: 66
End: 2021-10-01

## 2021-10-04 ENCOUNTER — PATIENT MESSAGE (OUTPATIENT)
Dept: ADMINISTRATIVE | Facility: HOSPITAL | Age: 66
End: 2021-10-04

## 2021-10-07 ENCOUNTER — PATIENT OUTREACH (OUTPATIENT)
Dept: ADMINISTRATIVE | Facility: HOSPITAL | Age: 66
End: 2021-10-07

## 2021-10-18 ENCOUNTER — OFFICE VISIT (OUTPATIENT)
Dept: PODIATRY | Facility: CLINIC | Age: 66
End: 2021-10-18
Payer: MEDICARE

## 2021-10-18 VITALS
BODY MASS INDEX: 32.6 KG/M2 | WEIGHT: 184 LBS | HEART RATE: 71 BPM | HEIGHT: 63 IN | DIASTOLIC BLOOD PRESSURE: 81 MMHG | SYSTOLIC BLOOD PRESSURE: 166 MMHG

## 2021-10-18 DIAGNOSIS — L60.0 INGROWN TOENAIL OF RIGHT FOOT: Primary | ICD-10-CM

## 2021-10-18 PROCEDURE — 1126F PR PAIN SEVERITY QUANTIFIED, NO PAIN PRESENT: ICD-10-PCS | Mod: CPTII,S$GLB,, | Performed by: PODIATRIST

## 2021-10-18 PROCEDURE — 99999 PR PBB SHADOW E&M-EST. PATIENT-LVL III: ICD-10-PCS | Mod: PBBFAC,,, | Performed by: PODIATRIST

## 2021-10-18 PROCEDURE — 3077F SYST BP >= 140 MM HG: CPT | Mod: CPTII,S$GLB,, | Performed by: PODIATRIST

## 2021-10-18 PROCEDURE — 3288F PR FALLS RISK ASSESSMENT DOCUMENTED: ICD-10-PCS | Mod: CPTII,S$GLB,, | Performed by: PODIATRIST

## 2021-10-18 PROCEDURE — 3008F PR BODY MASS INDEX (BMI) DOCUMENTED: ICD-10-PCS | Mod: CPTII,S$GLB,, | Performed by: PODIATRIST

## 2021-10-18 PROCEDURE — 3288F FALL RISK ASSESSMENT DOCD: CPT | Mod: CPTII,S$GLB,, | Performed by: PODIATRIST

## 2021-10-18 PROCEDURE — 3008F BODY MASS INDEX DOCD: CPT | Mod: CPTII,S$GLB,, | Performed by: PODIATRIST

## 2021-10-18 PROCEDURE — 99212 OFFICE O/P EST SF 10 MIN: CPT | Mod: S$GLB,,, | Performed by: PODIATRIST

## 2021-10-18 PROCEDURE — 1101F PT FALLS ASSESS-DOCD LE1/YR: CPT | Mod: CPTII,S$GLB,, | Performed by: PODIATRIST

## 2021-10-18 PROCEDURE — 3079F PR MOST RECENT DIASTOLIC BLOOD PRESSURE 80-89 MM HG: ICD-10-PCS | Mod: CPTII,S$GLB,, | Performed by: PODIATRIST

## 2021-10-18 PROCEDURE — 1126F AMNT PAIN NOTED NONE PRSNT: CPT | Mod: CPTII,S$GLB,, | Performed by: PODIATRIST

## 2021-10-18 PROCEDURE — 1101F PR PT FALLS ASSESS DOC 0-1 FALLS W/OUT INJ PAST YR: ICD-10-PCS | Mod: CPTII,S$GLB,, | Performed by: PODIATRIST

## 2021-10-18 PROCEDURE — 3079F DIAST BP 80-89 MM HG: CPT | Mod: CPTII,S$GLB,, | Performed by: PODIATRIST

## 2021-10-18 PROCEDURE — 3077F PR MOST RECENT SYSTOLIC BLOOD PRESSURE >= 140 MM HG: ICD-10-PCS | Mod: CPTII,S$GLB,, | Performed by: PODIATRIST

## 2021-10-18 PROCEDURE — 99999 PR PBB SHADOW E&M-EST. PATIENT-LVL III: CPT | Mod: PBBFAC,,, | Performed by: PODIATRIST

## 2021-10-18 PROCEDURE — 99212 PR OFFICE/OUTPT VISIT, EST, LEVL II, 10-19 MIN: ICD-10-PCS | Mod: S$GLB,,, | Performed by: PODIATRIST

## 2021-10-21 ENCOUNTER — IMMUNIZATION (OUTPATIENT)
Dept: FAMILY MEDICINE | Facility: CLINIC | Age: 66
End: 2021-10-21
Payer: MEDICARE

## 2021-10-21 PROCEDURE — 90694 FLU VACCINE - QUADRIVALENT - ADJUVANTED: ICD-10-PCS | Mod: S$GLB,,, | Performed by: FAMILY MEDICINE

## 2021-10-21 PROCEDURE — 90694 VACC AIIV4 NO PRSRV 0.5ML IM: CPT | Mod: S$GLB,,, | Performed by: FAMILY MEDICINE

## 2021-10-21 PROCEDURE — G0008 FLU VACCINE - QUADRIVALENT - ADJUVANTED: ICD-10-PCS | Mod: S$GLB,,, | Performed by: FAMILY MEDICINE

## 2021-10-21 PROCEDURE — G0008 ADMIN INFLUENZA VIRUS VAC: HCPCS | Mod: S$GLB,,, | Performed by: FAMILY MEDICINE

## 2021-11-01 DIAGNOSIS — H40.1131 PRIMARY OPEN ANGLE GLAUCOMA (POAG) OF BOTH EYES, MILD STAGE: Primary | ICD-10-CM

## 2021-11-01 RX ORDER — LATANOPROST 50 UG/ML
1 SOLUTION/ DROPS OPHTHALMIC DAILY
Qty: 2.5 ML | Refills: 1 | Status: SHIPPED | OUTPATIENT
Start: 2021-11-01 | End: 2022-03-04

## 2021-11-02 RX ORDER — LATANOPROST 50 UG/ML
1 SOLUTION/ DROPS OPHTHALMIC NIGHTLY
Qty: 2.5 ML | Refills: 6 | Status: SHIPPED | OUTPATIENT
Start: 2021-11-02 | End: 2022-12-01

## 2021-11-19 LAB — FUNGUS BLD CULT: NORMAL

## 2021-11-30 ENCOUNTER — OFFICE VISIT (OUTPATIENT)
Dept: FAMILY MEDICINE | Facility: CLINIC | Age: 66
End: 2021-11-30
Payer: MEDICARE

## 2021-11-30 VITALS
HEART RATE: 72 BPM | DIASTOLIC BLOOD PRESSURE: 80 MMHG | RESPIRATION RATE: 18 BRPM | SYSTOLIC BLOOD PRESSURE: 150 MMHG | OXYGEN SATURATION: 96 % | BODY MASS INDEX: 32.73 KG/M2 | TEMPERATURE: 98 F | WEIGHT: 184.75 LBS | HEIGHT: 63 IN

## 2021-11-30 DIAGNOSIS — R05.9 COUGH: ICD-10-CM

## 2021-11-30 DIAGNOSIS — R03.0 BLOOD PRESSURE ELEVATED WITHOUT HISTORY OF HTN: ICD-10-CM

## 2021-11-30 DIAGNOSIS — I48.0 PAROXYSMAL ATRIAL FIBRILLATION: ICD-10-CM

## 2021-11-30 DIAGNOSIS — B34.9 ACUTE VIRAL SYNDROME: Primary | ICD-10-CM

## 2021-11-30 PROCEDURE — 99214 OFFICE O/P EST MOD 30 MIN: CPT | Mod: 25,S$GLB,, | Performed by: REGISTERED NURSE

## 2021-11-30 PROCEDURE — 99214 PR OFFICE/OUTPT VISIT, EST, LEVL IV, 30-39 MIN: ICD-10-PCS | Mod: 25,S$GLB,, | Performed by: REGISTERED NURSE

## 2021-11-30 PROCEDURE — 96372 PR INJECTION,THERAP/PROPH/DIAG2ST, IM OR SUBCUT: ICD-10-PCS | Mod: S$GLB,,, | Performed by: REGISTERED NURSE

## 2021-11-30 PROCEDURE — 96372 THER/PROPH/DIAG INJ SC/IM: CPT | Mod: S$GLB,,, | Performed by: REGISTERED NURSE

## 2021-11-30 PROCEDURE — 99999 PR PBB SHADOW E&M-EST. PATIENT-LVL V: ICD-10-PCS | Mod: PBBFAC,,, | Performed by: REGISTERED NURSE

## 2021-11-30 PROCEDURE — 99999 PR PBB SHADOW E&M-EST. PATIENT-LVL V: CPT | Mod: PBBFAC,,, | Performed by: REGISTERED NURSE

## 2021-11-30 RX ORDER — BENZONATATE 200 MG/1
200 CAPSULE ORAL 3 TIMES DAILY PRN
Qty: 30 CAPSULE | Refills: 0 | Status: SHIPPED | OUTPATIENT
Start: 2021-11-30 | End: 2021-12-13

## 2021-11-30 RX ORDER — HYDROCODONE POLISTIREX AND CHLORPHENIRAMINE POLISTIREX 10; 8 MG/5ML; MG/5ML
5 SUSPENSION, EXTENDED RELEASE ORAL EVERY 12 HOURS PRN
Qty: 70 ML | Refills: 0 | Status: SHIPPED | OUTPATIENT
Start: 2021-11-30 | End: 2021-12-13

## 2021-11-30 RX ORDER — DEXAMETHASONE SODIUM PHOSPHATE 4 MG/ML
8 INJECTION, SOLUTION INTRA-ARTICULAR; INTRALESIONAL; INTRAMUSCULAR; INTRAVENOUS; SOFT TISSUE
Status: COMPLETED | OUTPATIENT
Start: 2021-11-30 | End: 2021-11-30

## 2021-11-30 RX ADMIN — DEXAMETHASONE SODIUM PHOSPHATE 8 MG: 4 INJECTION, SOLUTION INTRA-ARTICULAR; INTRALESIONAL; INTRAMUSCULAR; INTRAVENOUS; SOFT TISSUE at 03:11

## 2021-12-02 ENCOUNTER — PATIENT MESSAGE (OUTPATIENT)
Dept: OPHTHALMOLOGY | Facility: CLINIC | Age: 66
End: 2021-12-02
Payer: MEDICARE

## 2021-12-02 ENCOUNTER — PATIENT MESSAGE (OUTPATIENT)
Dept: FAMILY MEDICINE | Facility: CLINIC | Age: 66
End: 2021-12-02
Payer: MEDICARE

## 2021-12-03 RX ORDER — DOXYCYCLINE 100 MG/1
100 CAPSULE ORAL 2 TIMES DAILY
Qty: 20 CAPSULE | Refills: 0 | Status: SHIPPED | OUTPATIENT
Start: 2021-12-03 | End: 2021-12-13

## 2021-12-09 DIAGNOSIS — R05.9 COUGH: ICD-10-CM

## 2021-12-13 ENCOUNTER — OFFICE VISIT (OUTPATIENT)
Dept: FAMILY MEDICINE | Facility: CLINIC | Age: 66
End: 2021-12-13
Payer: MEDICARE

## 2021-12-13 VITALS
HEIGHT: 63 IN | HEART RATE: 63 BPM | SYSTOLIC BLOOD PRESSURE: 124 MMHG | OXYGEN SATURATION: 98 % | DIASTOLIC BLOOD PRESSURE: 80 MMHG | BODY MASS INDEX: 32.43 KG/M2 | RESPIRATION RATE: 18 BRPM | WEIGHT: 183 LBS | TEMPERATURE: 98 F

## 2021-12-13 DIAGNOSIS — E03.9 ACQUIRED HYPOTHYROIDISM: Chronic | ICD-10-CM

## 2021-12-13 DIAGNOSIS — Z12.31 ENCOUNTER FOR SCREENING MAMMOGRAM FOR MALIGNANT NEOPLASM OF BREAST: ICD-10-CM

## 2021-12-13 DIAGNOSIS — Z00.00 PREVENTATIVE HEALTH CARE: Primary | ICD-10-CM

## 2021-12-13 DIAGNOSIS — F43.22 ADJUSTMENT DISORDER WITH ANXIOUS MOOD: Chronic | ICD-10-CM

## 2021-12-13 DIAGNOSIS — Z12.11 SPECIAL SCREENING FOR MALIGNANT NEOPLASMS, COLON: ICD-10-CM

## 2021-12-13 DIAGNOSIS — E78.5 HYPERLIPIDEMIA, UNSPECIFIED HYPERLIPIDEMIA TYPE: ICD-10-CM

## 2021-12-13 DIAGNOSIS — I48.0 PAROXYSMAL ATRIAL FIBRILLATION: Chronic | ICD-10-CM

## 2021-12-13 PROCEDURE — 99999 PR PBB SHADOW E&M-EST. PATIENT-LVL III: CPT | Mod: PBBFAC,,, | Performed by: FAMILY MEDICINE

## 2021-12-13 PROCEDURE — 99397 PR PREVENTIVE VISIT,EST,65 & OVER: ICD-10-PCS | Mod: S$GLB,,, | Performed by: FAMILY MEDICINE

## 2021-12-13 PROCEDURE — 99397 PER PM REEVAL EST PAT 65+ YR: CPT | Mod: S$GLB,,, | Performed by: FAMILY MEDICINE

## 2021-12-13 PROCEDURE — 99999 PR PBB SHADOW E&M-EST. PATIENT-LVL III: ICD-10-PCS | Mod: PBBFAC,,, | Performed by: FAMILY MEDICINE

## 2021-12-13 RX ORDER — HYDROCODONE POLISTIREX AND CHLORPHENIRAMINE POLISTIREX 10; 8 MG/5ML; MG/5ML
5 SUSPENSION, EXTENDED RELEASE ORAL EVERY 12 HOURS PRN
Qty: 70 ML | Refills: 0 | OUTPATIENT
Start: 2021-12-13

## 2021-12-23 ENCOUNTER — OFFICE VISIT (OUTPATIENT)
Dept: OPHTHALMOLOGY | Facility: CLINIC | Age: 66
End: 2021-12-23
Payer: MEDICARE

## 2021-12-23 ENCOUNTER — PATIENT MESSAGE (OUTPATIENT)
Dept: OPHTHALMOLOGY | Facility: CLINIC | Age: 66
End: 2021-12-23
Payer: MEDICARE

## 2021-12-23 ENCOUNTER — TELEPHONE (OUTPATIENT)
Dept: OPHTHALMOLOGY | Facility: CLINIC | Age: 66
End: 2021-12-23
Payer: MEDICARE

## 2021-12-23 DIAGNOSIS — H18.832 RECURRENT CORNEAL EROSION, LEFT: Primary | ICD-10-CM

## 2021-12-23 PROCEDURE — 1159F MED LIST DOCD IN RCRD: CPT | Mod: CPTII,S$GLB,, | Performed by: OPTOMETRIST

## 2021-12-23 PROCEDURE — 99999 PR PBB SHADOW E&M-EST. PATIENT-LVL II: CPT | Mod: PBBFAC,,, | Performed by: OPTOMETRIST

## 2021-12-23 PROCEDURE — 99203 PR OFFICE/OUTPT VISIT, NEW, LEVL III, 30-44 MIN: ICD-10-PCS | Mod: S$GLB,,, | Performed by: OPTOMETRIST

## 2021-12-23 PROCEDURE — 99203 OFFICE O/P NEW LOW 30 MIN: CPT | Mod: S$GLB,,, | Performed by: OPTOMETRIST

## 2021-12-23 PROCEDURE — 99999 PR PBB SHADOW E&M-EST. PATIENT-LVL II: ICD-10-PCS | Mod: PBBFAC,,, | Performed by: OPTOMETRIST

## 2021-12-23 PROCEDURE — 1159F PR MEDICATION LIST DOCUMENTED IN MEDICAL RECORD: ICD-10-PCS | Mod: CPTII,S$GLB,, | Performed by: OPTOMETRIST

## 2021-12-23 RX ORDER — POLYMYXIN B SULFATE AND TRIMETHOPRIM 1; 10000 MG/ML; [USP'U]/ML
1 SOLUTION OPHTHALMIC 4 TIMES DAILY
Qty: 2.6667 ML | Refills: 0 | Status: SHIPPED | OUTPATIENT
Start: 2021-12-23 | End: 2022-01-02

## 2021-12-27 ENCOUNTER — PATIENT OUTREACH (OUTPATIENT)
Dept: ADMINISTRATIVE | Facility: OTHER | Age: 66
End: 2021-12-27
Payer: MEDICARE

## 2022-01-03 ENCOUNTER — OFFICE VISIT (OUTPATIENT)
Dept: OTOLARYNGOLOGY | Facility: CLINIC | Age: 67
End: 2022-01-03
Payer: MEDICARE

## 2022-01-03 VITALS — WEIGHT: 187.63 LBS | BODY MASS INDEX: 33.25 KG/M2 | HEIGHT: 63 IN

## 2022-01-03 DIAGNOSIS — J01.90 ACUTE BACTERIAL SINUSITIS: Primary | ICD-10-CM

## 2022-01-03 DIAGNOSIS — B96.89 ACUTE BACTERIAL SINUSITIS: Primary | ICD-10-CM

## 2022-01-03 PROCEDURE — 99999 PR PBB SHADOW E&M-EST. PATIENT-LVL III: ICD-10-PCS | Mod: PBBFAC,,, | Performed by: OTOLARYNGOLOGY

## 2022-01-03 PROCEDURE — 3008F PR BODY MASS INDEX (BMI) DOCUMENTED: ICD-10-PCS | Mod: CPTII,S$GLB,, | Performed by: OTOLARYNGOLOGY

## 2022-01-03 PROCEDURE — 1101F PT FALLS ASSESS-DOCD LE1/YR: CPT | Mod: CPTII,S$GLB,, | Performed by: OTOLARYNGOLOGY

## 2022-01-03 PROCEDURE — 3008F BODY MASS INDEX DOCD: CPT | Mod: CPTII,S$GLB,, | Performed by: OTOLARYNGOLOGY

## 2022-01-03 PROCEDURE — 1101F PR PT FALLS ASSESS DOC 0-1 FALLS W/OUT INJ PAST YR: ICD-10-PCS | Mod: CPTII,S$GLB,, | Performed by: OTOLARYNGOLOGY

## 2022-01-03 PROCEDURE — 1126F PR PAIN SEVERITY QUANTIFIED, NO PAIN PRESENT: ICD-10-PCS | Mod: CPTII,S$GLB,, | Performed by: OTOLARYNGOLOGY

## 2022-01-03 PROCEDURE — 99999 PR PBB SHADOW E&M-EST. PATIENT-LVL III: CPT | Mod: PBBFAC,,, | Performed by: OTOLARYNGOLOGY

## 2022-01-03 PROCEDURE — 1126F AMNT PAIN NOTED NONE PRSNT: CPT | Mod: CPTII,S$GLB,, | Performed by: OTOLARYNGOLOGY

## 2022-01-03 PROCEDURE — 3288F FALL RISK ASSESSMENT DOCD: CPT | Mod: CPTII,S$GLB,, | Performed by: OTOLARYNGOLOGY

## 2022-01-03 PROCEDURE — 3288F PR FALLS RISK ASSESSMENT DOCUMENTED: ICD-10-PCS | Mod: CPTII,S$GLB,, | Performed by: OTOLARYNGOLOGY

## 2022-01-03 PROCEDURE — 99203 OFFICE O/P NEW LOW 30 MIN: CPT | Mod: S$GLB,,, | Performed by: OTOLARYNGOLOGY

## 2022-01-03 PROCEDURE — 99203 PR OFFICE/OUTPT VISIT, NEW, LEVL III, 30-44 MIN: ICD-10-PCS | Mod: S$GLB,,, | Performed by: OTOLARYNGOLOGY

## 2022-01-03 RX ORDER — PANTOPRAZOLE SODIUM 40 MG/1
40 TABLET, DELAYED RELEASE ORAL DAILY
Qty: 30 TABLET | Refills: 11 | Status: SHIPPED | OUTPATIENT
Start: 2022-01-03 | End: 2023-01-10 | Stop reason: SDUPTHER

## 2022-01-03 RX ORDER — LEVOCETIRIZINE DIHYDROCHLORIDE 5 MG/1
5 TABLET, FILM COATED ORAL NIGHTLY
Qty: 30 TABLET | Refills: 11 | Status: SHIPPED | OUTPATIENT
Start: 2022-01-03 | End: 2023-02-09

## 2022-01-03 RX ORDER — MONTELUKAST SODIUM 10 MG/1
10 TABLET ORAL DAILY
Qty: 30 TABLET | Refills: 6 | Status: SHIPPED | OUTPATIENT
Start: 2022-01-03 | End: 2022-02-02

## 2022-01-03 RX ORDER — CEFDINIR 300 MG/1
300 CAPSULE ORAL 2 TIMES DAILY
Qty: 28 CAPSULE | Refills: 0 | Status: SHIPPED | OUTPATIENT
Start: 2022-01-03 | End: 2022-01-17

## 2022-01-04 ENCOUNTER — OFFICE VISIT (OUTPATIENT)
Dept: OPHTHALMOLOGY | Facility: CLINIC | Age: 67
End: 2022-01-04
Payer: MEDICARE

## 2022-01-04 DIAGNOSIS — M35.01 KERATITIS SICCA, BILATERAL: ICD-10-CM

## 2022-01-04 DIAGNOSIS — H40.1131 PRIMARY OPEN ANGLE GLAUCOMA (POAG) OF BOTH EYES, MILD STAGE: ICD-10-CM

## 2022-01-04 DIAGNOSIS — H18.832 RECURRENT CORNEAL EROSION, LEFT: Primary | ICD-10-CM

## 2022-01-04 PROCEDURE — 99213 PR OFFICE/OUTPT VISIT, EST, LEVL III, 20-29 MIN: ICD-10-PCS | Mod: S$GLB,,, | Performed by: OPTOMETRIST

## 2022-01-04 PROCEDURE — 1159F MED LIST DOCD IN RCRD: CPT | Mod: CPTII,S$GLB,, | Performed by: OPTOMETRIST

## 2022-01-04 PROCEDURE — 1160F PR REVIEW ALL MEDS BY PRESCRIBER/CLIN PHARMACIST DOCUMENTED: ICD-10-PCS | Mod: CPTII,S$GLB,, | Performed by: OPTOMETRIST

## 2022-01-04 PROCEDURE — 1160F RVW MEDS BY RX/DR IN RCRD: CPT | Mod: CPTII,S$GLB,, | Performed by: OPTOMETRIST

## 2022-01-04 PROCEDURE — 99999 PR PBB SHADOW E&M-EST. PATIENT-LVL III: CPT | Mod: PBBFAC,,, | Performed by: OPTOMETRIST

## 2022-01-04 PROCEDURE — 1159F PR MEDICATION LIST DOCUMENTED IN MEDICAL RECORD: ICD-10-PCS | Mod: CPTII,S$GLB,, | Performed by: OPTOMETRIST

## 2022-01-04 PROCEDURE — 99213 OFFICE O/P EST LOW 20 MIN: CPT | Mod: S$GLB,,, | Performed by: OPTOMETRIST

## 2022-01-04 PROCEDURE — 99999 PR PBB SHADOW E&M-EST. PATIENT-LVL III: ICD-10-PCS | Mod: PBBFAC,,, | Performed by: OPTOMETRIST

## 2022-01-04 NOTE — PROGRESS NOTES
HPI     Last seen by DKT on 12/23/21  Patient here today for cornea abrasion OS follow up  Patient using Polymyxin OS QID  Patient states eye is much better not 100% healed  Using drops as directed    Last edited by Hien Rivas, PCT on 1/4/2022 10:27 AM. (History)            Assessment /Plan     For exam results, see Encounter Report.    Recurrent corneal erosion, left  Resolved, stop polytrim, continue systane artificial tears 4-6 times daily with ointment at bedtime.     Primary open angle glaucoma (POAG) of both eyes, mild stage  IOP at target today. Continue latanprost at bedtime OU    Keratitis sicca, bilateral  S/p Lasik OU, doing well with Restasis BID    RTC with Dr Bosch as scheduled, [place] PRN. Continue:    Latanoprost QHS OU  Restasis BID OU   Systane ointment at bedtime OU

## 2022-01-18 NOTE — PROGRESS NOTES
"Referring Provider:    Aaareferral Self  No address on file  Subjective:   Patient: Yolis Bourgeois [id number], :1955   Visit date:1/3/2022 3:23 PM    Chief Complaint: see below  HPI:       Post Nasal Drip , Ear Fullness, and Nasal Congestion (Been going on since mid 2021)  She has allergies but this has been significantly different than her typical seasonal allergies  She is not on a consistent allergy regimen that is affective for her  Constant sense of post nasal drip but never has significant sputum or mucus expelled.  Prior notes reviewed  Clinical documentation obtained by nursing staff reviewed.         Objective:     Physical Exam:  Vitals:  Ht 5' 3" (1.6 m)   Wt 85.1 kg (187 lb 9.8 oz)   BMI 33.23 kg/m²   General appearance:  Well developed, well nourished    Ears:  Otoscopy of external auditory canals and tympanic membranes was normal, clinical speech reception thresholds grossly intact, no mass/lesion of auricle.    Nose:  No masses/lesions of external nose, nasal mucosa, septum, and turbinates were within normal limits.    Mouth:  No mass/lesion of lips, teeth, gums, hard/soft palate, tongue, tonsils, or oropharynx.    Neck & Lymphatics:  No cervical lymphadenopathy, no neck mass/crepitus/ asymmetry, trachea is midline, no thyroid enlargement/tenderness/mass.              []  Data Reviewed:    Lab Results   Component Value Date    WBC 8.40 2020    HGB 13.5 2020    HCT 43.2 2020    MCV 97 2020    EOSINOPHIL 1.3 2020                 Assessment & Plan:   Acute bacterial sinusitis    Other orders  -     levocetirizine (XYZAL) 5 MG tablet; Take 1 tablet (5 mg total) by mouth every evening.  Dispense: 30 tablet; Refill: 11  -     montelukast (SINGULAIR) 10 mg tablet; Take 1 tablet (10 mg total) by mouth once daily.  Dispense: 30 tablet; Refill: 6  -     cefdinir (OMNICEF) 300 MG capsule; Take 1 capsule (300 mg total) by mouth 2 (two) times daily. for 14 days  " Dispense: 28 capsule; Refill: 0  -     pantoprazole (PROTONIX) 40 MG tablet; Take 1 tablet (40 mg total) by mouth once daily.  Dispense: 30 tablet; Refill: 11        We discussed her medical conditions, treatments and plan.  Yolis should return to clinic if any issues arise (symptoms worsen or persist), otherwise we will see her back in the clinic only as needed.      Thank you for allowing me to participate in the care of Yolis.       Panfilo Blake MD, FACS  [facility]   [facility]  [address]  [place] [address]  P: [phone]  F: [phone]

## 2022-01-19 ENCOUNTER — PATIENT MESSAGE (OUTPATIENT)
Dept: FAMILY MEDICINE | Facility: CLINIC | Age: 67
End: 2022-01-19
Payer: MEDICARE

## 2022-01-19 ENCOUNTER — PATIENT MESSAGE (OUTPATIENT)
Dept: OPHTHALMOLOGY | Facility: CLINIC | Age: 67
End: 2022-01-19
Payer: MEDICARE

## 2022-01-19 RX ORDER — BUTALBITAL, ACETAMINOPHEN AND CAFFEINE 50; 325; 40 MG/1; MG/1; MG/1
1 TABLET ORAL
Qty: 60 TABLET | Refills: 2 | Status: SHIPPED | OUTPATIENT
Start: 2022-01-19 | End: 2022-12-29 | Stop reason: SDUPTHER

## 2022-01-19 NOTE — TELEPHONE ENCOUNTER
No new care gaps identified.  Powered by [employer]. Reference number: [id number].   1/19/2022 6:36:17 AM CST

## 2022-03-01 ENCOUNTER — HOSPITAL ENCOUNTER (OUTPATIENT)
Dept: RADIOLOGY | Facility: HOSPITAL | Age: 67
Discharge: HOME | End: 2022-03-01
Attending: FAMILY MEDICINE
Payer: MEDICARE

## 2022-03-01 DIAGNOSIS — Z12.31 ENCOUNTER FOR SCREENING MAMMOGRAM FOR MALIGNANT NEOPLASM OF BREAST: ICD-10-CM

## 2022-03-01 PROCEDURE — 77067 SCR MAMMO BI INCL CAD: CPT | Mod: 26,,, | Performed by: RADIOLOGY

## 2022-03-01 PROCEDURE — 77067 SCR MAMMO BI INCL CAD: CPT | Mod: TC

## 2022-03-01 PROCEDURE — 77067 MAMMO DIGITAL SCREENING BILAT WITH TOMO: ICD-10-PCS | Mod: 26,,, | Performed by: RADIOLOGY

## 2022-03-01 PROCEDURE — 77063 BREAST TOMOSYNTHESIS BI: CPT | Mod: TC

## 2022-03-01 PROCEDURE — 77063 MAMMO DIGITAL SCREENING BILAT WITH TOMO: ICD-10-PCS | Mod: 26,,, | Performed by: RADIOLOGY

## 2022-03-01 PROCEDURE — 77063 BREAST TOMOSYNTHESIS BI: CPT | Mod: 26,,, | Performed by: RADIOLOGY

## 2022-03-01 NOTE — PROGRESS NOTES
"Subjective:       Patient ID: Yolis Bourgeois is a 66 y.o. female.      Chief Complaint   Patient presents with    Leg Pain       HPI    Muscle Pain  This is a chronic (right thigh) problem. The current episode started 1 to 4 weeks ago (issue for past few years, worse over past few weeks --- has been taking care of  who has been physically debilitated for past few years). The problem has been rapidly worsening. Associated symptoms include arthralgias and myalgias. Pertinent negatives include no chest pain, headaches, joint swelling, neck pain, numbness, vomiting or weakness. Associated symptoms comments: Describes pain as stabbing, burning, tingling.. Nothing (no specific triggers, "it's just there") aggravates the symptoms. She has tried lying down and rest for the symptoms. The treatment provided no relief.          Review of Systems   Constitutional: Negative for activity change and unexpected weight change.   HENT: Negative for hearing loss, rhinorrhea and trouble swallowing.    Eyes: Negative for discharge and visual disturbance.   Respiratory: Negative for chest tightness and wheezing.    Cardiovascular: Negative for chest pain and palpitations.   Gastrointestinal: Negative for blood in stool, constipation, diarrhea and vomiting.   Endocrine: Negative for polydipsia and polyuria.   Genitourinary: Negative for difficulty urinating, dysuria, hematuria and menstrual problem.   Musculoskeletal: Positive for arthralgias, back pain and myalgias. Negative for gait problem, joint swelling, neck pain and neck stiffness.   Skin: Negative.    Neurological: Negative for weakness, numbness and headaches.   Psychiatric/Behavioral: Positive for sleep disturbance. Negative for confusion and dysphoric mood.         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 , Disp: , Rfl:     latanoprost 0.005 % ophthalmic solution, Place 1 drop into both eyes every evening., Disp: 2.5 mL, Rfl: 6    levocetirizine (XYZAL) 5 MG tablet, Take 1 tablet (5 mg total) by mouth every evening., Disp: 30 tablet, Rfl: 11    metoprolol tartrate (LOPRESSOR) 25 MG tablet, Take 25 mg by mouth 2 (two) times daily. , Disp: , Rfl: 9    montelukast (SINGULAIR) 10 mg tablet, Take 10 mg by mouth once daily., Disp: , Rfl:     multivitamin with minerals Cap, Take 2 capsules by mouth Daily., Disp: , Rfl:     pantoprazole (PROTONIX) 40 MG tablet, Take 1 tablet (40 mg total) by mouth once daily., Disp: 30 tablet, Rfl: 11    piroxicam (FELDENE) 20 MG capsule, Take 1 capsule (20 mg total) by mouth daily as needed (pain)., Disp: 30 capsule, Rfl: 2    thyroid, pork, (ARMOUR THYROID) 90 mg Tab, Take 1 tablet by mouth before breakfast., Disp: , Rfl:     cycloSPORINE (RESTASIS) 0.05 % ophthalmic emulsion, Place 1 drop into both eyes 2 (two) times daily., Disp: 60 vial, Rfl: 12        Past medical, surgical, family and social histories have been reviewed today.      Objective:     Vitals:    03/04/22 1045   BP: (!) 148/78  "  Pulse: 70   Temp: 97.4 °F (36.3 °C)   SpO2: 97%   Weight: 82.6 kg (182 lb 1.6 oz)   Height: 5' 3" (1.6 m)   PainSc: 10-Worst pain ever   PainLoc: Leg         Physical Exam  Constitutional:       General: She is not in acute distress.  HENT:      Head: Normocephalic.   Cardiovascular:      Pulses:           Dorsalis pedis pulses are 2+ on the right side.        Posterior tibial pulses are 2+ on the right side.   Musculoskeletal:      Right upper leg: Tenderness present. No swelling, edema, deformity or bony tenderness.      Right lower leg: No edema.      Left lower leg: No edema.        Legs:    Skin:     Capillary Refill: Capillary refill takes less than 2 seconds.   Neurological:      Mental Status: She is alert and oriented to person, place, and time.      Motor: No weakness.      Gait: Gait normal.           Assessment     1. Paresthesia of lower limb  - gabapentin (NEURONTIN) 100 MG capsule; Take 1 capsule (100 mg total) by mouth every evening.  Dispense: 90 capsule; Refill: 1  - methylPREDNISolone (MEDROL DOSEPACK) 4 mg tablet; use as directed  Dispense: 1 each; Refill: 0  Highly likely leg issue originating in her back.  Low dose bedtime gabapentin for now, can up-titrate dose as needed.  Watch for sedating effects from gabapentin.  To specialist if s/s worsen or persist.       Follow-up       Return to clinic as needed.      FAHAD Blackburn  [facility]     "

## 2022-03-04 ENCOUNTER — OFFICE VISIT (OUTPATIENT)
Dept: FAMILY MEDICINE | Facility: CLINIC | Age: 67
End: 2022-03-04
Payer: MEDICARE

## 2022-03-04 VITALS
DIASTOLIC BLOOD PRESSURE: 70 MMHG | TEMPERATURE: 97 F | OXYGEN SATURATION: 97 % | HEIGHT: 63 IN | WEIGHT: 182.13 LBS | HEART RATE: 70 BPM | BODY MASS INDEX: 32.27 KG/M2 | SYSTOLIC BLOOD PRESSURE: 138 MMHG

## 2022-03-04 DIAGNOSIS — R20.2 PARESTHESIA OF LOWER LIMB: Primary | ICD-10-CM

## 2022-03-04 PROCEDURE — 1101F PR PT FALLS ASSESS DOC 0-1 FALLS W/OUT INJ PAST YR: ICD-10-PCS | Mod: CPTII,S$GLB,, | Performed by: REGISTERED NURSE

## 2022-03-04 PROCEDURE — 3078F DIAST BP <80 MM HG: CPT | Mod: CPTII,S$GLB,, | Performed by: REGISTERED NURSE

## 2022-03-04 PROCEDURE — 1101F PT FALLS ASSESS-DOCD LE1/YR: CPT | Mod: CPTII,S$GLB,, | Performed by: REGISTERED NURSE

## 2022-03-04 PROCEDURE — 3288F PR FALLS RISK ASSESSMENT DOCUMENTED: ICD-10-PCS | Mod: CPTII,S$GLB,, | Performed by: REGISTERED NURSE

## 2022-03-04 PROCEDURE — 3288F FALL RISK ASSESSMENT DOCD: CPT | Mod: CPTII,S$GLB,, | Performed by: REGISTERED NURSE

## 2022-03-04 PROCEDURE — 1159F PR MEDICATION LIST DOCUMENTED IN MEDICAL RECORD: ICD-10-PCS | Mod: CPTII,S$GLB,, | Performed by: REGISTERED NURSE

## 2022-03-04 PROCEDURE — 3078F PR MOST RECENT DIASTOLIC BLOOD PRESSURE < 80 MM HG: ICD-10-PCS | Mod: CPTII,S$GLB,, | Performed by: REGISTERED NURSE

## 2022-03-04 PROCEDURE — 1159F MED LIST DOCD IN RCRD: CPT | Mod: CPTII,S$GLB,, | Performed by: REGISTERED NURSE

## 2022-03-04 PROCEDURE — 1125F PR PAIN SEVERITY QUANTIFIED, PAIN PRESENT: ICD-10-PCS | Mod: CPTII,S$GLB,, | Performed by: REGISTERED NURSE

## 2022-03-04 PROCEDURE — 1125F AMNT PAIN NOTED PAIN PRSNT: CPT | Mod: CPTII,S$GLB,, | Performed by: REGISTERED NURSE

## 2022-03-04 PROCEDURE — 3075F SYST BP GE 130 - 139MM HG: CPT | Mod: CPTII,S$GLB,, | Performed by: REGISTERED NURSE

## 2022-03-04 PROCEDURE — 99999 PR PBB SHADOW E&M-EST. PATIENT-LVL IV: CPT | Mod: PBBFAC,,, | Performed by: REGISTERED NURSE

## 2022-03-04 PROCEDURE — 3008F BODY MASS INDEX DOCD: CPT | Mod: CPTII,S$GLB,, | Performed by: REGISTERED NURSE

## 2022-03-04 PROCEDURE — 3008F PR BODY MASS INDEX (BMI) DOCUMENTED: ICD-10-PCS | Mod: CPTII,S$GLB,, | Performed by: REGISTERED NURSE

## 2022-03-04 PROCEDURE — 99214 OFFICE O/P EST MOD 30 MIN: CPT | Mod: S$GLB,,, | Performed by: REGISTERED NURSE

## 2022-03-04 PROCEDURE — 3075F PR MOST RECENT SYSTOLIC BLOOD PRESS GE 130-139MM HG: ICD-10-PCS | Mod: CPTII,S$GLB,, | Performed by: REGISTERED NURSE

## 2022-03-04 PROCEDURE — 99214 PR OFFICE/OUTPT VISIT, EST, LEVL IV, 30-39 MIN: ICD-10-PCS | Mod: S$GLB,,, | Performed by: REGISTERED NURSE

## 2022-03-04 PROCEDURE — 99999 PR PBB SHADOW E&M-EST. PATIENT-LVL IV: ICD-10-PCS | Mod: PBBFAC,,, | Performed by: REGISTERED NURSE

## 2022-03-04 RX ORDER — GABAPENTIN 100 MG/1
100 CAPSULE ORAL NIGHTLY
Qty: 90 CAPSULE | Refills: 1 | Status: SHIPPED | OUTPATIENT
Start: 2022-03-04 | End: 2022-12-28

## 2022-03-04 RX ORDER — MONTELUKAST SODIUM 10 MG/1
10 TABLET ORAL DAILY
COMMUNITY
Start: 2022-02-03 | End: 2023-02-09

## 2022-03-04 RX ORDER — METHYLPREDNISOLONE 4 MG/1
TABLET ORAL
Qty: 1 EACH | Refills: 0 | Status: SHIPPED | OUTPATIENT
Start: 2022-03-04 | End: 2022-10-28

## 2022-03-04 RX ORDER — AZELASTINE 1 MG/ML
SPRAY, METERED NASAL
COMMUNITY
Start: 2021-12-24 | End: 2022-10-28

## 2022-04-19 ENCOUNTER — PATIENT OUTREACH (OUTPATIENT)
Dept: ADMINISTRATIVE | Facility: OTHER | Age: 67
End: 2022-04-19
Payer: MEDICARE

## 2022-04-20 ENCOUNTER — OFFICE VISIT (OUTPATIENT)
Dept: OPHTHALMOLOGY | Facility: CLINIC | Age: 67
End: 2022-04-20
Payer: MEDICARE

## 2022-04-20 DIAGNOSIS — H40.1131 PRIMARY OPEN ANGLE GLAUCOMA (POAG) OF BOTH EYES, MILD STAGE: Primary | ICD-10-CM

## 2022-04-20 DIAGNOSIS — H04.129 DRYNESS, EYE: ICD-10-CM

## 2022-04-20 DIAGNOSIS — M35.01 KERATITIS SICCA, BILATERAL: ICD-10-CM

## 2022-04-20 DIAGNOSIS — H18.832 RECURRENT CORNEAL EROSION, LEFT: ICD-10-CM

## 2022-04-20 DIAGNOSIS — H35.433 PAVING STONE RETINAL DEGENERATION OF BOTH EYES: ICD-10-CM

## 2022-04-20 PROCEDURE — 1160F PR REVIEW ALL MEDS BY PRESCRIBER/CLIN PHARMACIST DOCUMENTED: ICD-10-PCS | Mod: CPTII,S$GLB,, | Performed by: OPHTHALMOLOGY

## 2022-04-20 PROCEDURE — 99214 PR OFFICE/OUTPT VISIT, EST, LEVL IV, 30-39 MIN: ICD-10-PCS | Mod: S$GLB,,, | Performed by: OPHTHALMOLOGY

## 2022-04-20 PROCEDURE — 1159F MED LIST DOCD IN RCRD: CPT | Mod: CPTII,S$GLB,, | Performed by: OPHTHALMOLOGY

## 2022-04-20 PROCEDURE — 99214 OFFICE O/P EST MOD 30 MIN: CPT | Mod: S$GLB,,, | Performed by: OPHTHALMOLOGY

## 2022-04-20 PROCEDURE — 1159F PR MEDICATION LIST DOCUMENTED IN MEDICAL RECORD: ICD-10-PCS | Mod: CPTII,S$GLB,, | Performed by: OPHTHALMOLOGY

## 2022-04-20 PROCEDURE — 99999 PR PBB SHADOW E&M-EST. PATIENT-LVL III: CPT | Mod: PBBFAC,,, | Performed by: OPHTHALMOLOGY

## 2022-04-20 PROCEDURE — 2023F DILAT RTA XM W/O RTNOPTHY: CPT | Mod: CPTII,S$GLB,, | Performed by: OPHTHALMOLOGY

## 2022-04-20 PROCEDURE — 1160F RVW MEDS BY RX/DR IN RCRD: CPT | Mod: CPTII,S$GLB,, | Performed by: OPHTHALMOLOGY

## 2022-04-20 PROCEDURE — 2023F PR DILATED RETINAL EXAM W/O EVID OF RETINOPATHY: ICD-10-PCS | Mod: CPTII,S$GLB,, | Performed by: OPHTHALMOLOGY

## 2022-04-20 PROCEDURE — 92083 HUMPHREY VISUAL FIELD - OU - BOTH EYES: ICD-10-PCS | Mod: S$GLB,,, | Performed by: OPHTHALMOLOGY

## 2022-04-20 PROCEDURE — 92083 EXTENDED VISUAL FIELD XM: CPT | Mod: S$GLB,,, | Performed by: OPHTHALMOLOGY

## 2022-04-20 PROCEDURE — 99999 PR PBB SHADOW E&M-EST. PATIENT-LVL III: ICD-10-PCS | Mod: PBBFAC,,, | Performed by: OPHTHALMOLOGY

## 2022-04-20 NOTE — PROGRESS NOTES
HPI     Glaucoma     Comments: Pt reports for 4m IOP check with [facility]. Denies any pain or   discomfort. Va stable. 100% compliant with gtts.              Comments     1. Anatomic variant mac degen  2. Cobblestone od  3. Dry eyes  Punctal plugs ou  4. H/O Lasik  5. H/O Iritis os  HVF 3/18/19  6.poag  7.rce od  8. SLT OU 6-11-20      Latanoprost QHS OU  PF Thera Tears 4-6 times a day OU  E-mycin OINT DAILY OS            Last edited by Leonel Sampson on 4/20/2022  3:31 PM. (History)            Assessment /Plan     For exam results, see Encounter Report.      ICD-10-CM ICD-9-CM    1. Primary open angle glaucoma (POAG) of both eyes, mild stage  H40.1131 365.11 Sung Visual Field - OU - Extended - Both Eyes done today   Doing well - intraocular pressure is within acceptable range relative to target pressure with no evidence of progression.   Continue current treatment.  Reviewed importance of continued compliance with treatment and follow up.        365.71    2. Recurrent corneal erosion, left  H18.832 371.42 H/o- appears stable- follow    3. Keratitis sicca, bilateral  M35.01 370.8 Use tears    4. 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 Dryness, eye  H04.129 375.15 See above, use tears and gel            Latanoprost QHS OU    RETURN TO CLINIC 4 month, DOA AND HVF   PF Thera Tears 4-6 times a day OU  E-mycin OINT DAILY OS

## 2022-04-20 NOTE — PROGRESS NOTES
Health Maintenance Due   Topic Date Due    Shingles Vaccine (2 of 3) 12/25/2015    Colorectal Cancer Screening  05/16/2021    COVID-19 Vaccine (3 - Booster for Moderna series) 08/15/2021    Pneumococcal Vaccines (Age 65+) (2 - PCV) 12/17/2021     Updates were requested from care everywhere.  Chart was reviewed for overdue Proactive [facility] Encounters (IT) topics (CRS, Breast Cancer Screening, Eye exam)  Health Maintenance has been updated.  LINKS immunization registry triggered.  Immunizations were reconciled.

## 2022-05-19 ENCOUNTER — HOSPITAL ENCOUNTER (OUTPATIENT)
Dept: RADIOLOGY | Facility: HOSPITAL | Age: 67
Discharge: HOME | End: 2022-05-19
Attending: UROLOGY
Payer: MEDICARE

## 2022-05-19 DIAGNOSIS — N28.1 RENAL CYST: ICD-10-CM

## 2022-05-19 PROCEDURE — 76770 US EXAM ABDO BACK WALL COMP: CPT | Mod: TC

## 2022-05-19 PROCEDURE — 76770 US RETROPERITONEAL COMPLETE: ICD-10-PCS | Mod: 26,,, | Performed by: RADIOLOGY

## 2022-05-19 PROCEDURE — 76770 US EXAM ABDO BACK WALL COMP: CPT | Mod: 26,,, | Performed by: RADIOLOGY

## 2022-05-24 ENCOUNTER — OFFICE VISIT (OUTPATIENT)
Dept: UROLOGY | Facility: CLINIC | Age: 67
End: 2022-05-24
Payer: MEDICARE

## 2022-05-24 VITALS
TEMPERATURE: 98 F | HEART RATE: 77 BPM | WEIGHT: 185.44 LBS | SYSTOLIC BLOOD PRESSURE: 144 MMHG | BODY MASS INDEX: 32.84 KG/M2 | DIASTOLIC BLOOD PRESSURE: 82 MMHG

## 2022-05-24 DIAGNOSIS — N28.1 RENAL CYST: Primary | ICD-10-CM

## 2022-05-24 PROCEDURE — 1101F PT FALLS ASSESS-DOCD LE1/YR: CPT | Mod: CPTII,S$GLB,, | Performed by: UROLOGY

## 2022-05-24 PROCEDURE — 3077F PR MOST RECENT SYSTOLIC BLOOD PRESSURE >= 140 MM HG: ICD-10-PCS | Mod: CPTII,S$GLB,, | Performed by: UROLOGY

## 2022-05-24 PROCEDURE — 1159F MED LIST DOCD IN RCRD: CPT | Mod: CPTII,S$GLB,, | Performed by: UROLOGY

## 2022-05-24 PROCEDURE — 3288F PR FALLS RISK ASSESSMENT DOCUMENTED: ICD-10-PCS | Mod: CPTII,S$GLB,, | Performed by: UROLOGY

## 2022-05-24 PROCEDURE — 99999 PR PBB SHADOW E&M-EST. PATIENT-LVL IV: ICD-10-PCS | Mod: PBBFAC,,, | Performed by: UROLOGY

## 2022-05-24 PROCEDURE — 3079F PR MOST RECENT DIASTOLIC BLOOD PRESSURE 80-89 MM HG: ICD-10-PCS | Mod: CPTII,S$GLB,, | Performed by: UROLOGY

## 2022-05-24 PROCEDURE — 3077F SYST BP >= 140 MM HG: CPT | Mod: CPTII,S$GLB,, | Performed by: UROLOGY

## 2022-05-24 PROCEDURE — 3288F FALL RISK ASSESSMENT DOCD: CPT | Mod: CPTII,S$GLB,, | Performed by: UROLOGY

## 2022-05-24 PROCEDURE — 1159F PR MEDICATION LIST DOCUMENTED IN MEDICAL RECORD: ICD-10-PCS | Mod: CPTII,S$GLB,, | Performed by: UROLOGY

## 2022-05-24 PROCEDURE — 3008F PR BODY MASS INDEX (BMI) DOCUMENTED: ICD-10-PCS | Mod: CPTII,S$GLB,, | Performed by: UROLOGY

## 2022-05-24 PROCEDURE — 1101F PR PT FALLS ASSESS DOC 0-1 FALLS W/OUT INJ PAST YR: ICD-10-PCS | Mod: CPTII,S$GLB,, | Performed by: UROLOGY

## 2022-05-24 PROCEDURE — 1160F PR REVIEW ALL MEDS BY PRESCRIBER/CLIN PHARMACIST DOCUMENTED: ICD-10-PCS | Mod: CPTII,S$GLB,, | Performed by: UROLOGY

## 2022-05-24 PROCEDURE — 99213 OFFICE O/P EST LOW 20 MIN: CPT | Mod: S$GLB,,, | Performed by: UROLOGY

## 2022-05-24 PROCEDURE — 1126F PR PAIN SEVERITY QUANTIFIED, NO PAIN PRESENT: ICD-10-PCS | Mod: CPTII,S$GLB,, | Performed by: UROLOGY

## 2022-05-24 PROCEDURE — 3008F BODY MASS INDEX DOCD: CPT | Mod: CPTII,S$GLB,, | Performed by: UROLOGY

## 2022-05-24 PROCEDURE — 1160F RVW MEDS BY RX/DR IN RCRD: CPT | Mod: CPTII,S$GLB,, | Performed by: UROLOGY

## 2022-05-24 PROCEDURE — 99213 PR OFFICE/OUTPT VISIT, EST, LEVL III, 20-29 MIN: ICD-10-PCS | Mod: S$GLB,,, | Performed by: UROLOGY

## 2022-05-24 PROCEDURE — 1126F AMNT PAIN NOTED NONE PRSNT: CPT | Mod: CPTII,S$GLB,, | Performed by: UROLOGY

## 2022-05-24 PROCEDURE — 99999 PR PBB SHADOW E&M-EST. PATIENT-LVL IV: CPT | Mod: PBBFAC,,, | Performed by: UROLOGY

## 2022-05-24 PROCEDURE — 3079F DIAST BP 80-89 MM HG: CPT | Mod: CPTII,S$GLB,, | Performed by: UROLOGY

## 2022-05-24 NOTE — PROGRESS NOTES
Chief Complaint: Renal cysts    HPI:   05/25/2021 - patient presents today for follow-up, no issues in the interim, renal ultrasound several days ago showed stable renal cysts, no voiding issues, no gross hematuria    5/21/20: Reviewed history in detail. Feeling fine.  DORY about the same.   Indep assessment of imaging agree with report:  Renal cyst same right kidney.  No time for PFMT her  is having severe complications of multiple maladies.  5/20/19: Reviewed history in detail. Feeling fine, no problems.  Cyst same as before smaller if anything.  Some DORY not needing a pad.  5/17/18: No problems in the interval. Bilateral cysts same.  5/11/17: 62 yo woman followed for 4 years for renal cysts.  Had a pyelonephritis on the way.  Has a 2.5 cm right Adal II cyst that looks stable over many years.    Allergies:  Climara [estradiol]    Medications: has a current medication list which includes the following prescription(s): aspirin, azelastine, butalbital-acetaminophen-caffeine -40 mg, cyclosporine, finasteride, latanoprost, levocetirizine, metoprolol tartrate, montelukast, multivitamin with minerals, pantoprazole, piroxicam, thyroid (pork), gabapentin, and methylprednisolone.    Review of Systems:  General: No fever, chills, fatigability, or weight loss.  Skin: No rashes, itching, or changes in color or texture of skin.  Chest: Denies NIEVES, cyanosis, wheezing, cough, and sputum production.  Abdomen: Appetite fine. No weight loss. Denies diarrhea, abdominal pain, hematemesis, or blood in stool.  Musculoskeletal: No joint stiffness or swelling. Denies back pain.  : As above.  All other review of systems negative.    PMH:   has a past medical history of Chronic constipation, Depression, Dry eyes, Endometriosis of broad ligament, Hypothyroidism, Macular degeneration, Migraine headache, Osteoarthritis of hand, PAF (paroxysmal atrial fibrillation), Primary open angle glaucoma (POAG) of both eyes, mild stage  (04/24/2018), and Seasonal allergic rhinitis.    PSH:   has a past surgical history that includes Lipoma resection (2010); Total abdominal hysterectomy w/ bilateral salpingoophorectomy (March 2008); Cervical conization w/ laser; Dilation and curettage of uterus; LASIK (2007); Argon Trabeculoplasty - OU - Both Eyes (Bilateral, 06/11/2020); and Hysterectomy.    FamHx: family history includes Dementia in her mother; Diabetes in her father; Glaucoma in her maternal uncle; Heart disease in her father and mother; Heart failure in her father; Hypertension in her father and mother; No Known Problems in her brother.    SocHx:  reports that she quit smoking about 39 years ago. She has never used smokeless tobacco. She reports current alcohol use. She reports that she does not use drugs.     Physical Exam:  Vitals:   Vitals:    05/24/22 1500   BP: (!) 144/82   Pulse: 77   Temp: 98.1 °F (36.7 °C)     General: awake, alert, cooperative  Head: NC/AT  Ears: external ears normal  Eyes: sclera normal  Lungs: normal inspiration, NAD  Heart: well-perfused  Skin: The skin is warm and dry.  Ext: No c/c/e.  Neuro: grossly intact, AOx3      Labs/Studies:   US RETROPERITONEAL COMPLETE 05/19/2022      CLINICAL HISTORY:  Cyst of kidney, acquired     TECHNIQUE:  Ultrasound of the kidneys and urinary bladder was performed including color flow and Doppler evaluation of the kidneys.     COMPARISON:  05/19/2021.     FINDINGS:  Right kidney: The right kidney measures 10.3 cm with normal cortical echogenicity.  No cortical thinning or loss of corticomedullary distinction.  No significant change in 1.4 cm cyst with mural calcification in the lower pole.  No renal mass, nephrolithiasis, or hydronephrosis.     Left kidney: The left kidney measures 9.8 cm with normal cortical echogenicity.  No cortical thinning or loss of corticomedullary distinction. No significant change in 9 mm midpole cyst with suggested mural calcification.  No renal mass,  nephrolithiasis, or hydronephrosis.     Bladder: Unremarkable.     Impression:     Stable examination.    Impression/Plan:   Minimally complex renal cyst - f/u 1 yr with GLORIA Light MD

## 2022-07-08 ENCOUNTER — PATIENT MESSAGE (OUTPATIENT)
Dept: FAMILY MEDICINE | Facility: CLINIC | Age: 67
End: 2022-07-08
Payer: MEDICARE

## 2022-07-08 DIAGNOSIS — Z12.11 COLON CANCER SCREENING: Primary | ICD-10-CM

## 2022-07-20 ENCOUNTER — HOSPITAL ENCOUNTER (OUTPATIENT)
Dept: PREADMISSION TESTING | Facility: HOSPITAL | Age: 67
Discharge: HOME | End: 2022-07-20
Attending: FAMILY MEDICINE
Payer: MEDICARE

## 2022-07-20 DIAGNOSIS — Z12.11 COLON CANCER SCREENING: Primary | ICD-10-CM

## 2022-08-16 ENCOUNTER — ANESTHESIA EVENT (OUTPATIENT)
Dept: ENDOSCOPY | Facility: HOSPITAL | Age: 67
End: 2022-08-16
Payer: MEDICARE

## 2022-08-16 NOTE — ANESTHESIA PREPROCEDURE EVALUATION
08/16/2022  Yolis Bourgeois is a 66 y.o., female.  Past Surgical History:   Procedure Laterality Date    CERVICAL CONIZATION   W/ LASER      DILATION AND CURETTAGE OF UTERUS      HYSTERECTOMY      LASIK  2007    LIPOMA RESECTION  2010    right thigh    SLT - OU - BOTH EYES Bilateral 06/11/2020    TOTAL ABDOMINAL HYSTERECTOMY W/ BILATERAL SALPINGOOPHORECTOMY  March 2008     Past Medical History:   Diagnosis Date    Chronic constipation     Depression     Dry eyes     Endometriosis of broad ligament     Hypothyroidism     Macular degeneration     Migraine headache     Osteoarthritis of hand     PAF (paroxysmal atrial fibrillation)     Primary open angle glaucoma (POAG) of both eyes, mild stage 04/24/2018    Seasonal allergic rhinitis      Patient Active Problem List   Diagnosis    Acquired hypothyroidism    Migraine headache    Osteoarthritis of hand    Seasonal allergic rhinitis    Paroxysmal atrial fibrillation    Dryness, eye    Adjustment disorder with anxious mood    Chronic constipation    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     I have reviewed the Nursing Notes. I have reviewed the NPO Status.   I have reviewed the Medications.     Review of Systems  Anesthesia Hx:  No problems with previous Anesthesia  Denies Family Hx of Anesthesia complications.   Denies Personal Hx of Anesthesia complications.   Social:  Former Smoker, Social Alcohol Use    Hematology/Oncology:  Hematology Normal   Oncology Normal     EENT/Dental:EENT/Dental Normal   Cardiovascular:  Cardiovascular Normal Exercise tolerance: good  Past history of PAF; pt. Denies at present    Pulmonary:  Pulmonary Normal    Renal/:  Renal/ Normal  Renal cyst    Hepatic/GI:  Hepatic/GI Normal Bowel Prep.    Musculoskeletal:   Arthritis     Neurological:   Headaches    Endocrine:   Hypothyroidism  Obesity / BMI > 30  Dermatological:  Skin Normal    Psych:   depression          Physical Exam  General: Well nourished, Cooperative, Alert and Oriented    Airway:  Mallampati: II   Mouth Opening: Normal  TM Distance: Normal  Tongue: Normal  Neck ROM: Normal ROM    Dental:  Intact    Chest/Lungs:  Normal Respiratory Rate        Anesthesia Plan  Type of Anesthesia, risks & benefits discussed:    Anesthesia Type: Gen Natural Airway  Intra-op Monitoring Plan: Standard ASA Monitors  Post Op Pain Control Plan: multimodal analgesia  Induction:  IV  Informed Consent: Informed consent signed with the Patient and all parties understand the risks and agree with anesthesia plan.  All questions answered. Patient consented to blood products? No  ASA Score: 2  Day of Surgery Review of History & Physical: H&P Update referred to the surgeon/provider.    Ready For Surgery From Anesthesia Perspective.     .

## 2022-08-18 ENCOUNTER — HOSPITAL ENCOUNTER (OUTPATIENT)
Facility: HOSPITAL | Age: 67
Discharge: HOME | End: 2022-08-18
Attending: SURGERY | Admitting: SURGERY
Payer: MEDICARE

## 2022-08-18 ENCOUNTER — ANESTHESIA (OUTPATIENT)
Dept: ENDOSCOPY | Facility: HOSPITAL | Age: 67
End: 2022-08-18
Payer: MEDICARE

## 2022-08-18 VITALS
TEMPERATURE: 97 F | RESPIRATION RATE: 20 BRPM | OXYGEN SATURATION: 97 % | DIASTOLIC BLOOD PRESSURE: 78 MMHG | HEIGHT: 63 IN | HEART RATE: 53 BPM | SYSTOLIC BLOOD PRESSURE: 171 MMHG | WEIGHT: 183.44 LBS | BODY MASS INDEX: 32.5 KG/M2

## 2022-08-18 DIAGNOSIS — Z12.11 COLON CANCER SCREENING: ICD-10-CM

## 2022-08-18 PROCEDURE — 45380 COLONOSCOPY AND BIOPSY: CPT | Mod: PT | Performed by: SURGERY

## 2022-08-18 PROCEDURE — 88305 TISSUE EXAM BY PATHOLOGIST: CPT | Performed by: PATHOLOGY

## 2022-08-18 PROCEDURE — 37000008 HC ANESTHESIA 1ST 15 MINUTES: Performed by: SURGERY

## 2022-08-18 PROCEDURE — D9220A PRA ANESTHESIA: ICD-10-PCS | Mod: PT,CRNA,, | Performed by: NURSE ANESTHETIST, CERTIFIED REGISTERED

## 2022-08-18 PROCEDURE — 63600175 PHARM REV CODE 636 W HCPCS: Performed by: SURGERY

## 2022-08-18 PROCEDURE — 37000009 HC ANESTHESIA EA ADD 15 MINS: Performed by: SURGERY

## 2022-08-18 PROCEDURE — 88305 TISSUE EXAM BY PATHOLOGIST: CPT | Mod: 26,,, | Performed by: PATHOLOGY

## 2022-08-18 PROCEDURE — 27201012 HC FORCEPS, HOT/COLD, DISP: Performed by: SURGERY

## 2022-08-18 PROCEDURE — D9220A PRA ANESTHESIA: Mod: PT,CRNA,, | Performed by: NURSE ANESTHETIST, CERTIFIED REGISTERED

## 2022-08-18 PROCEDURE — 45380 PR COLONOSCOPY,BIOPSY: ICD-10-PCS | Mod: PT,,, | Performed by: SURGERY

## 2022-08-18 PROCEDURE — 88305 TISSUE EXAM BY PATHOLOGIST: ICD-10-PCS | Mod: 26,,, | Performed by: PATHOLOGY

## 2022-08-18 PROCEDURE — 45380 COLONOSCOPY AND BIOPSY: CPT | Mod: PT,,, | Performed by: SURGERY

## 2022-08-18 PROCEDURE — D9220A PRA ANESTHESIA: Mod: PT,ANES,, | Performed by: ANESTHESIOLOGY

## 2022-08-18 PROCEDURE — D9220A PRA ANESTHESIA: ICD-10-PCS | Mod: PT,ANES,, | Performed by: ANESTHESIOLOGY

## 2022-08-18 PROCEDURE — 63600175 PHARM REV CODE 636 W HCPCS: Performed by: NURSE ANESTHETIST, CERTIFIED REGISTERED

## 2022-08-18 RX ORDER — SODIUM CHLORIDE, SODIUM LACTATE, POTASSIUM CHLORIDE, CALCIUM CHLORIDE 600; 310; 30; 20 MG/100ML; MG/100ML; MG/100ML; MG/100ML
INJECTION, SOLUTION INTRAVENOUS CONTINUOUS
Status: DISCONTINUED | OUTPATIENT
Start: 2022-08-18 | End: 2022-08-18 | Stop reason: HOSPADM

## 2022-08-18 RX ORDER — LIDOCAINE HYDROCHLORIDE 10 MG/ML
INJECTION INFILTRATION; PERINEURAL
Status: DISCONTINUED | OUTPATIENT
Start: 2022-08-18 | End: 2022-08-18

## 2022-08-18 RX ORDER — PROPOFOL 10 MG/ML
VIAL (ML) INTRAVENOUS
Status: DISCONTINUED | OUTPATIENT
Start: 2022-08-18 | End: 2022-08-18

## 2022-08-18 RX ADMIN — PROPOFOL 20 MG: 10 INJECTION, EMULSION INTRAVENOUS at 10:08

## 2022-08-18 RX ADMIN — PROPOFOL 10 MG: 10 INJECTION, EMULSION INTRAVENOUS at 10:08

## 2022-08-18 RX ADMIN — LIDOCAINE HYDROCHLORIDE 20 MG: 10 INJECTION INFILTRATION; PERINEURAL at 10:08

## 2022-08-18 RX ADMIN — PROPOFOL 30 MG: 10 INJECTION, EMULSION INTRAVENOUS at 10:08

## 2022-08-18 RX ADMIN — PROPOFOL 100 MG: 10 INJECTION, EMULSION INTRAVENOUS at 10:08

## 2022-08-18 RX ADMIN — SODIUM CHLORIDE, SODIUM LACTATE, POTASSIUM CHLORIDE, AND CALCIUM CHLORIDE: .6; .31; .03; .02 INJECTION, SOLUTION INTRAVENOUS at 08:08

## 2022-08-18 NOTE — ANESTHESIA POSTPROCEDURE EVALUATION
Anesthesia Post Evaluation    Patient: Yolis Bourgeois    Procedure(s) Performed: Procedure(s) (LRB):  COLONOSCOPY (N/A)    Final Anesthesia Type: general      Patient location during evaluation: PACU  Patient participation: Yes- Able to Participate  Level of consciousness: awake and alert and oriented  Post-procedure vital signs: reviewed and stable  Pain management: adequate  Airway patency: patent    PONV status at discharge: No PONV  Anesthetic complications: no      Cardiovascular status: blood pressure returned to baseline, stable and hemodynamically stable  Respiratory status: unassisted  Hydration status: euvolemic  Follow-up not needed.          Vitals Value Taken Time   /78 08/18/22 1035   Temp 36.2 °C (97.1 °F) 08/18/22 1020   Pulse 52 08/18/22 1042   Resp 46 08/18/22 1041   SpO2 99 % 08/18/22 1042   Vitals shown include unvalidated device data.      Event Time   Out of Recovery 10:48:00         Pain/Kathy Score: Kathy Score: 10 (8/18/2022 10:44 AM)

## 2022-08-18 NOTE — DISCHARGE SUMMARY
[facility] - Endoscopy 1st Fl  Discharge Note  Short Stay    Procedure(s) (LRB):  COLONOSCOPY (N/A)    OUTCOME: Patient tolerated treatment/procedure well without complication and is now ready for discharge.    DISPOSITION: Home or Self Care    FINAL DIAGNOSIS:  Colon cancer screening    FOLLOWUP: None    DISCHARGE INSTRUCTIONS:    Discharge Procedure Orders   Activity as tolerated        TIME SPENT ON DISCHARGE: 30 minutes

## 2022-08-18 NOTE — PROVATION PATIENT INSTRUCTIONS
Discharge Summary/Instructions after an Endoscopic Procedure  Patient Name: Yolis Bourgeois  Patient MRN: [id number]  Patient YOB: 1955  Thursday, August 18, 2022  Seamus South MD  Dear patient,  As a result of recent federal legislation (The Federal Cures Act), you may   receive lab or pathology results from your procedure in your [facility]   account before your physician is able to contact you. Your physician or   their representative will relay the results to you with their   recommendations at their soonest availability.  Thank you,  RESTRICTIONS:  During your procedure today, you received medications for sedation.  These   medications may affect your judgment, balance and coordination.  Therefore,   for 24 hours, you have the following restrictions:   - DO NOT drive a car, operate machinery, make legal/financial decisions,   sign important papers or drink alcohol.    ACTIVITY:  Today: no heavy lifting, straining or running due to procedural   sedation/anesthesia.  The following day: return to full activity including work.  DIET:  Eat and drink normally unless instructed otherwise.     TREATMENT FOR COMMON SIDE EFFECTS:  - Mild abdominal pain, nausea, belching, bloating or excessive gas:  rest,   eat lightly and use a heating pad.  - Sore Throat: treat with throat lozenges and/or gargle with warm salt   water.  - Because air was used during the procedure, expelling large amounts of air   from your rectum or belching is normal.  - If a bowel prep was taken, you may not have a bowel movement for 1-3 days.    This is normal.  SYMPTOMS TO WATCH FOR AND REPORT TO YOUR PHYSICIAN:  1. Abdominal pain or bloating, other than gas cramps.  2. Chest pain.  3. Back pain.  4. Signs of infection such as: chills or fever occurring within 24 hours   after the procedure.  5. Rectal bleeding, which would show as bright red, maroon, or black stools.   (A tablespoon of blood from the rectum is not serious, especially  if   hemorrhoids are present.)  6. Vomiting.  7. Weakness or dizziness.  GO DIRECTLY TO THE NEAREST EMERGENCY ROOM IF YOU HAVE ANY OF THE FOLLOWING:      Difficulty breathing              Chills and/or fever over 101 F   Persistent vomiting and/or vomiting blood   Severe abdominal pain   Severe chest pain   Black, tarry stools   Bleeding- more than one tablespoon   Any other symptom or condition that you feel may need urgent attention  Your doctor recommends these additional instructions:  If any biopsies were taken, your doctors clinic will contact you in 1 to 2   weeks with any results.  - Patient has a contact number available for emergencies.  The signs and   symptoms of potential delayed complications were discussed with the   patient.  Return to normal activities tomorrow.  Written discharge   instructions were provided to the patient.   - Resume previous diet.   - Continue present medications.   - Await pathology results.   - Repeat colonoscopy in 10 years for screening purposes.   - Return to referring physician as previously scheduled.   - Discharge patient to home.  For questions, problems or results please call your physician Seamus South MD at Work:  [phone]  If you have any questions about the above instructions, call the GI   department at [phone] or call the endoscopy unit at [phone]   from 7am until 3 pm.  [facility], EMERGENCY ROOM PHONE NUMBER:   [phone]  IF A COMPLICATION OR EMERGENCY SITUATION ARISES AND YOU ARE UNABLE TO REACH   YOUR PHYSICIAN - GO DIRECTLY TO THE EMERGENCY ROOM.  I have read or have had read to me these discharge instructions for my   procedure and have received a written copy.  I understand these   instructions and will follow-up with my physician if I have any questions.     __________________________________       _____________________________________  Nurse Signature                                           Patient/Designated   Responsible Party Signature  Seamus South MD  8/18/2022 10:19:09 AM  This report has been verified and signed electronically.  Dear patient,  As a result of recent federal legislation (The Federal Cures Act), you may   receive lab or pathology results from your procedure in your [facility]   account before your physician is able to contact you. Your physician or   their representative will relay the results to you with their   recommendations at their soonest availability.  Thank you,  PROVATION

## 2022-08-18 NOTE — H&P
Endoscopy H&P    Procedure : Colonoscopy      asymptomatic screening exam and most recent endoscopic exam 12 years ago      Past Medical History:   Diagnosis Date    Chronic constipation     Depression     Dry eyes     Endometriosis of broad ligament     Hypothyroidism     Macular degeneration     Migraine headache     Osteoarthritis of hand     PAF (paroxysmal atrial fibrillation)     Primary open angle glaucoma (POAG) of both eyes, mild stage 04/24/2018    Seasonal allergic rhinitis        Past Surgical History:   Procedure Laterality Date    CERVICAL CONIZATION   W/ LASER      DILATION AND CURETTAGE OF UTERUS      HYSTERECTOMY      LASIK  2007    LIPOMA RESECTION  2010    right thigh    SLT - OU - BOTH EYES Bilateral 06/11/2020    TOTAL ABDOMINAL HYSTERECTOMY W/ BILATERAL SALPINGOOPHORECTOMY  March 2008     No current facility-administered medications on file prior to encounter.     Current Outpatient Medications on File Prior to Encounter   Medication Sig Dispense Refill    aspirin (ECOTRIN) 81 MG EC tablet Take 81 mg by mouth once daily.      azelastine (ASTELIN) 137 mcg (0.1 %) nasal spray SMARTSIG:Both Nares      butalbital-acetaminophen-caffeine -40 mg (FIORICET, ESGIC) -40 mg per tablet Take 1 tablet by mouth every 4 to 6 hours as needed for Headaches. 60 tablet 2    cycloSPORINE (RESTASIS) 0.05 % ophthalmic emulsion Place 1 drop into both eyes 2 (two) times daily. 60 each 12    finasteride (PROPECIA) 1 mg tablet Take 1 mg by mouth every other day.       gabapentin (NEURONTIN) 100 MG capsule Take 1 capsule (100 mg total) by mouth every evening. 90 capsule 1    latanoprost 0.005 % ophthalmic solution Place 1 drop into both eyes every evening. 2.5 mL 6    levocetirizine (XYZAL) 5 MG tablet Take 1 tablet (5 mg total) by mouth every evening. 30 tablet 11    methylPREDNISolone (MEDROL DOSEPACK) 4 mg tablet use as directed 1 each 0    metoprolol tartrate (LOPRESSOR) 25 MG  tablet Take 25 mg by mouth 2 (two) times daily.   9    montelukast (SINGULAIR) 10 mg tablet Take 10 mg by mouth once daily.      multivitamin with minerals Cap Take 2 capsules by mouth Daily.      pantoprazole (PROTONIX) 40 MG tablet Take 1 tablet (40 mg total) by mouth once daily. 30 tablet 11    piroxicam (FELDENE) 20 MG capsule Take 1 capsule (20 mg total) by mouth daily as needed (pain). 30 capsule 2    thyroid, pork, (ARMOUR THYROID) 90 mg Tab Take 1 tablet by mouth before breakfast.       Review of patient's allergies indicates:   Allergen Reactions    Climara [estradiol]      Glue on climara patch           Review of Systems -ROS:  GENERAL: No fever, chills, fatigability or weight loss.  CHEST: Denies NIEVES, cyanosis, wheezing, cough and sputum production.  CARDIOVASCULAR: Denies chest pain, PND, orthopnea or reduced exercise tolerance.   Musculoskeletal ROS: negative for - gait disturbance or joint pain  Neurological ROS: negative for - confusion or memory loss        Physical Exam:  General: well developed, well nourished, no distress  Head: normocephalic  Neck: supple, symmetrical, trachea midline  Lungs:  clear to auscultation bilaterally and normal respiratory effort  Heart: regular rate and rhythm, S1, S2 normal, no murmur, rub or gallop and regular rate and rhythm  Abdomen: soft, non-tender non-distented; bowel sounds normal; no masses,  no organomegaly  Extremities: no cyanosis or edema, or clubbing       Moderate Sedation (choice): Mallampati Score 1    ASA : II    IMP: asymptomatic screening exam and most recent endoscopic exam 12 years ago    Plan: Colonoscopy with Moderate sedation.  I have explained the procedure including indications, alternatives, expected outcomes and potential complications. The patient appears to understand and gives informed consent. The patient is medically ready for surgery.

## 2022-08-18 NOTE — PLAN OF CARE
/78, HR 53, pt asymptomatic, denies CP, SOB, HA, vision changes or dizziness/weakness, CRNA notified and cleared pt for d/c home.

## 2022-08-18 NOTE — TRANSFER OF CARE
"Anesthesia Transfer of Care Note    Patient: Yolis Bourgeois    Procedure(s) Performed: Procedure(s) (LRB):  COLONOSCOPY (N/A)    Patient location: PACU    Anesthesia Type: MAC    Transport from OR: Transported from OR on room air with adequate spontaneous ventilation    Post pain: adequate analgesia    Post assessment: no apparent anesthetic complications and tolerated procedure well    Post vital signs: stable    Level of consciousness: awake and alert    Nausea/Vomiting: no nausea/vomiting    Complications: none    Transfer of care protocol was followed      Last vitals:   Visit Vitals  BP (!) 184/91   Pulse 60   Temp 36.6 °C (97.8 °F)   Resp 16   Ht 5' 3" (1.6 m)   Wt 83.2 kg (183 lb 6.8 oz)   SpO2 96%   Breastfeeding No   BMI 32.49 kg/m²     "

## 2022-08-18 NOTE — PLAN OF CARE
Discharge instructions reviewed with patient and visitor. Handouts given & verbalized understanding with no further questions at this time. Dr. South spoke to pt at bedside, reviewed procedure and findings, answered questions. Made aware they are awaiting biopsy results with MD telephone number provided per AVS sheet. VSS on RA, no pain or nausea noted, tolerating po fluids, no complaints noted. Fall precautions reviewed, consents in chart.

## 2022-08-23 LAB
FINAL PATHOLOGIC DIAGNOSIS: NORMAL
GROSS: NORMAL
Lab: NORMAL

## 2022-08-24 ENCOUNTER — OFFICE VISIT (OUTPATIENT)
Dept: OPHTHALMOLOGY | Facility: CLINIC | Age: 67
End: 2022-08-24
Payer: MEDICARE

## 2022-08-24 DIAGNOSIS — H40.1131 PRIMARY OPEN ANGLE GLAUCOMA (POAG) OF BOTH EYES, MILD STAGE: Primary | ICD-10-CM

## 2022-08-24 DIAGNOSIS — M35.01 KERATITIS SICCA, BILATERAL: ICD-10-CM

## 2022-08-24 DIAGNOSIS — H04.129 DRYNESS, EYE: ICD-10-CM

## 2022-08-24 DIAGNOSIS — H18.832 RECURRENT CORNEAL EROSION, LEFT: ICD-10-CM

## 2022-08-24 PROCEDURE — 1159F PR MEDICATION LIST DOCUMENTED IN MEDICAL RECORD: ICD-10-PCS | Mod: CPTII,S$GLB,, | Performed by: OPHTHALMOLOGY

## 2022-08-24 PROCEDURE — 99214 PR OFFICE/OUTPT VISIT, EST, LEVL IV, 30-39 MIN: ICD-10-PCS | Mod: S$GLB,,, | Performed by: OPHTHALMOLOGY

## 2022-08-24 PROCEDURE — 99214 OFFICE O/P EST MOD 30 MIN: CPT | Mod: S$GLB,,, | Performed by: OPHTHALMOLOGY

## 2022-08-24 PROCEDURE — 99999 PR PBB SHADOW E&M-EST. PATIENT-LVL III: ICD-10-PCS | Mod: PBBFAC,,, | Performed by: OPHTHALMOLOGY

## 2022-08-24 PROCEDURE — 1159F MED LIST DOCD IN RCRD: CPT | Mod: CPTII,S$GLB,, | Performed by: OPHTHALMOLOGY

## 2022-08-24 PROCEDURE — 99999 PR PBB SHADOW E&M-EST. PATIENT-LVL III: CPT | Mod: PBBFAC,,, | Performed by: OPHTHALMOLOGY

## 2022-08-24 NOTE — PROGRESS NOTES
HPI     Glaucoma     Comments: 4 month IOP check with [facility]     Patient states OU has been irritated but states she does not use the   ointment as much as she should but feels its more environmental.   Wants   to defer dilation until next visit and HVF.              Comments     1. Anatomic variant mac degen  2. Cobblestone od  3. Dry eyes  Punctal plugs ou  4. H/O Lasik  5. H/O Iritis os  HVF 3/18/19  6.poag  7.rce od  8. SLT OU 6-11-20      Latanoprost QHS OU  PF Thera Tears 4-6 times a day OU  Systane oint prn          Last edited by Miriam Mcgovern, Patient Care Assistant on 8/24/2022  3:25   PM. (History)            Assessment /Plan     For exam results, see Encounter Report.      ICD-10-CM ICD-9-CM    1. Primary open angle glaucoma (POAG) of both eyes, mild stage  H40.1131 365.11 Doing well - intraocular pressure is within acceptable range relative to target pressure with no evidence of progression.   Continue current treatment.  Reviewed importance of continued compliance with treatment and follow up.        365.71    2. Recurrent corneal erosion, left  H18.832 371.42 H/o    3. Keratitis sicca, bilateral  M35.01 370.8 Significant today and worse on exam    4. Dryness, eye  H04.129 375.15 See above        Start Systane Hydration BID OU and Use refresh Yan tears BID  Will alternate between the 2 drops    Resume gel at bedtime       RETURN TO CLINIC 4 mths for angy  and k sensitivity  test with [facility]   Consider Zioptan

## 2022-08-27 ENCOUNTER — PATIENT MESSAGE (OUTPATIENT)
Dept: SURGERY | Facility: HOSPITAL | Age: 67
End: 2022-08-27
Payer: MEDICARE

## 2022-10-27 ENCOUNTER — E-VISIT (OUTPATIENT)
Dept: FAMILY MEDICINE | Facility: CLINIC | Age: 67
End: 2022-10-27
Payer: MEDICARE

## 2022-10-27 ENCOUNTER — PATIENT MESSAGE (OUTPATIENT)
Dept: FAMILY MEDICINE | Facility: CLINIC | Age: 67
End: 2022-10-27

## 2022-10-27 DIAGNOSIS — R05.9 COUGH, UNSPECIFIED TYPE: ICD-10-CM

## 2022-10-27 DIAGNOSIS — J32.9 SINUSITIS, UNSPECIFIED CHRONICITY, UNSPECIFIED LOCATION: Primary | ICD-10-CM

## 2022-10-27 PROCEDURE — 99422 OL DIG E/M SVC 11-20 MIN: CPT | Mod: S$GLB,,, | Performed by: REGISTERED NURSE

## 2022-10-27 PROCEDURE — 99422 PR E&M, ONLINE DIGIT, EST, < 7 DAYS,  11-20 MINS: ICD-10-PCS | Mod: S$GLB,,, | Performed by: REGISTERED NURSE

## 2022-10-28 RX ORDER — PROMETHAZINE HYDROCHLORIDE AND DEXTROMETHORPHAN HYDROBROMIDE 6.25; 15 MG/5ML; MG/5ML
5 SYRUP ORAL
Qty: 150 ML | Refills: 0 | Status: SHIPPED | OUTPATIENT
Start: 2022-10-28 | End: 2022-12-28

## 2022-10-28 RX ORDER — AMOXICILLIN AND CLAVULANATE POTASSIUM 875; 125 MG/1; MG/1
1 TABLET, FILM COATED ORAL 2 TIMES DAILY
Qty: 20 TABLET | Refills: 0 | Status: SHIPPED | OUTPATIENT
Start: 2022-10-28 | End: 2022-11-07

## 2022-10-28 RX ORDER — BENZONATATE 100 MG/1
100-200 CAPSULE ORAL 3 TIMES DAILY PRN
Qty: 45 CAPSULE | Refills: 0 | Status: SHIPPED | OUTPATIENT
Start: 2022-10-28 | End: 2023-02-09

## 2022-10-28 NOTE — PROGRESS NOTES
Patient ID: Yolis Bourgeois is a 67 y.o. White female.    Chief Complaint:  Cough, fatigue, runny nose      Yolis initiated a request through My-Chart on 10/27/2022 for evaluation and management of their chief complaint.  I evaluated the questionnaire results that were submitted on 10/27/2022.      Ohs Peq Evisit Upper Respitatory/Cough Questionnaire    10/27/2022  7:51 PM CDT - Filed by Patient   Do you agree to participate in an E-Visit? Yes   If you have any of the following symptoms, please present to your local ER or call 911:  I acknowledge   What is the main issue that you would like for your doctor to address today? Slow recovery   Are you able to take your vital signs? No   What symptoms do you currently have?  Cough;  Fatigue;  Runny nose   Have you had a fever? Yes   What has been the range of your fever? Less than 100.4   When did your symptoms first appear? 10/18/2022   In the last two weeks, have you been in close contact with someone who has COVID-19 or the Flu? No   In the last two weeks, have you worked or volunteered in a healthcare facility or as a ? Healthcare facilities include a hospital, medical or dental clinic, long-term care facility, or nursing home No   Do you live in a long-term care facility, nursing home, or homeless shelter? No   List what you have done or taken to help your symptoms. Taken prescription meds for symptoms   How severe are your symptoms? Moderate   Have you taken an at home Covid test? Yes   What were the results? Negative   Have you taken a Flu test? No   Have you been fully vaccinated for COVID? (2 Pfizer, 2 Moderna or 1 Farooq & Far[employer]q vaccine injections) Yes   Have you received a booster? No   Have you recieved a Flu shot? No   Do you have transportation to get tested for COVID if it is indicated and ordered for you at an [facility] location? 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   Latest known visit with results is:   Admission on 08/18/2022, Discharged on 08/18/2022   Component Date Value Ref Range Status    Final Pathologic Diagnosis 08/18/2022    Final                    Value:RECTUM, POLYPECTOMY:  Mild hyperplastic changes      Interp By Shauna Maki M.D., Signed on 08/23/2022 at 13:04    Gross 08/18/2022    Final                    Value:Pathology ID/Patient ID:  [id number]  The specimen is received in formalin labeled "rectal polypectomy".  The  specimen consists of one tan-brown fragment of soft tissue with measurements  0.2 x 0.2 x 0.2 cm.  The specimen is submitted entirely in cassette  FRK--1-WAYLON Dickens      Disclaimer 08/18/2022    Final                    Value:Unless the case is a 'gross only' or additional testing only, the final  diagnosis for each specimen is based on a microscopic examination of  appropriate tissue sections.           Diagnosis/Assessment:    1. Sinusitis, unspecified chronicity, unspecified location  - amoxicillin-clavulanate 875-125mg (AUGMENTIN) 875-125 mg per tablet; Take 1 tablet by mouth 2 (two) times daily. for 10 days  Dispense: 20 tablet; Refill: 0    2. Cough, unspecified type  - benzonatate " (TESSALON) 100 MG capsule; Take 1-2 capsules (100-200 mg total) by mouth 3 (three) times daily as needed for Cough.  Dispense: 45 capsule; Refill: 0  - promethazine-dextromethorphan (PROMETHAZINE-DM) 6.25-15 mg/5 mL Syrp; Take 5 mLs by mouth every 4 to 6 hours as needed (cough).  Dispense: 150 mL; Refill: 0      Follow-up:    Contact office back if s/s worsen or fail to improve.  RTC as directed and/or prn.      E-Visit Time Tracking:    Day 1 Time (in minutes): 8  Day 2 Time (in minutes): 5

## 2022-12-28 PROBLEM — Z12.11 COLON CANCER SCREENING: Status: RESOLVED | Noted: 2022-08-18 | Resolved: 2022-12-28

## 2022-12-29 ENCOUNTER — OFFICE VISIT (OUTPATIENT)
Dept: FAMILY MEDICINE | Facility: CLINIC | Age: 67
End: 2022-12-29
Payer: MEDICARE

## 2022-12-29 ENCOUNTER — LAB VISIT (OUTPATIENT)
Dept: LAB | Facility: HOSPITAL | Age: 67
End: 2022-12-29
Attending: FAMILY MEDICINE
Payer: MEDICARE

## 2022-12-29 VITALS
WEIGHT: 186.81 LBS | OXYGEN SATURATION: 97 % | SYSTOLIC BLOOD PRESSURE: 144 MMHG | HEIGHT: 63 IN | DIASTOLIC BLOOD PRESSURE: 86 MMHG | BODY MASS INDEX: 33.1 KG/M2 | HEART RATE: 88 BPM | TEMPERATURE: 98 F

## 2022-12-29 DIAGNOSIS — F43.22 ADJUSTMENT DISORDER WITH ANXIOUS MOOD: Chronic | ICD-10-CM

## 2022-12-29 DIAGNOSIS — Z83.3 FAMILY HISTORY OF DIABETES MELLITUS (DM): ICD-10-CM

## 2022-12-29 DIAGNOSIS — Z00.00 PREVENTATIVE HEALTH CARE: Primary | ICD-10-CM

## 2022-12-29 DIAGNOSIS — G43.909 MIGRAINE WITHOUT STATUS MIGRAINOSUS, NOT INTRACTABLE, UNSPECIFIED MIGRAINE TYPE: ICD-10-CM

## 2022-12-29 DIAGNOSIS — I48.0 PAROXYSMAL ATRIAL FIBRILLATION: Chronic | ICD-10-CM

## 2022-12-29 DIAGNOSIS — E66.9 CLASS 1 OBESITY WITH BODY MASS INDEX (BMI) OF 33.0 TO 33.9 IN ADULT, UNSPECIFIED OBESITY TYPE, UNSPECIFIED WHETHER SERIOUS COMORBIDITY PRESENT: ICD-10-CM

## 2022-12-29 DIAGNOSIS — Z00.00 PREVENTATIVE HEALTH CARE: ICD-10-CM

## 2022-12-29 DIAGNOSIS — E03.9 ACQUIRED HYPOTHYROIDISM: Chronic | ICD-10-CM

## 2022-12-29 DIAGNOSIS — Z23 NEED FOR PNEUMOCOCCAL VACCINATION: ICD-10-CM

## 2022-12-29 PROBLEM — E66.811 CLASS 1 OBESITY WITH BODY MASS INDEX (BMI) OF 33.0 TO 33.9 IN ADULT: Status: ACTIVE | Noted: 2022-12-29

## 2022-12-29 LAB
ALBUMIN SERPL BCP-MCNC: 4 G/DL (ref 3.5–5.2)
ALP SERPL-CCNC: 75 U/L (ref 55–135)
ALT SERPL W/O P-5'-P-CCNC: 25 U/L (ref 10–44)
ANION GAP SERPL CALC-SCNC: 12 MMOL/L (ref 8–16)
AST SERPL-CCNC: 25 U/L (ref 10–40)
BASOPHILS # BLD AUTO: 0.04 K/UL (ref 0–0.2)
BASOPHILS NFR BLD: 0.6 % (ref 0–1.9)
BILIRUB SERPL-MCNC: 0.6 MG/DL (ref 0.1–1)
BUN SERPL-MCNC: 12 MG/DL (ref 8–23)
CALCIUM SERPL-MCNC: 9.6 MG/DL (ref 8.7–10.5)
CHLORIDE SERPL-SCNC: 106 MMOL/L (ref 95–110)
CO2 SERPL-SCNC: 21 MMOL/L (ref 23–29)
CREAT SERPL-MCNC: 0.7 MG/DL (ref 0.5–1.4)
DIFFERENTIAL METHOD: ABNORMAL
EOSINOPHIL # BLD AUTO: 0.1 K/UL (ref 0–0.5)
EOSINOPHIL NFR BLD: 1.5 % (ref 0–8)
ERYTHROCYTE [DISTWIDTH] IN BLOOD BY AUTOMATED COUNT: 12.5 % (ref 11.5–14.5)
EST. GFR  (NO RACE VARIABLE): >60 ML/MIN/1.73 M^2
ESTIMATED AVG GLUCOSE: 108 MG/DL (ref 68–131)
GLUCOSE SERPL-MCNC: 92 MG/DL (ref 70–110)
HBA1C MFR BLD: 5.4 % (ref 4–5.6)
HCT VFR BLD AUTO: 43.1 % (ref 37–48.5)
HGB BLD-MCNC: 14.1 G/DL (ref 12–16)
IMM GRANULOCYTES # BLD AUTO: 0.02 K/UL (ref 0–0.04)
IMM GRANULOCYTES NFR BLD AUTO: 0.3 % (ref 0–0.5)
INSULIN COLLECTION INTERVAL: NORMAL
INSULIN SERPL-ACNC: 11.8 UU/ML
LYMPHOCYTES # BLD AUTO: 2 K/UL (ref 1–4.8)
LYMPHOCYTES NFR BLD: 30.2 % (ref 18–48)
MCH RBC QN AUTO: 31.7 PG (ref 27–31)
MCHC RBC AUTO-ENTMCNC: 32.7 G/DL (ref 32–36)
MCV RBC AUTO: 97 FL (ref 82–98)
MONOCYTES # BLD AUTO: 0.6 K/UL (ref 0.3–1)
MONOCYTES NFR BLD: 8.2 % (ref 4–15)
NEUTROPHILS # BLD AUTO: 4 K/UL (ref 1.8–7.7)
NEUTROPHILS NFR BLD: 59.2 % (ref 38–73)
NRBC BLD-RTO: 0 /100 WBC
PLATELET # BLD AUTO: 281 K/UL (ref 150–450)
PMV BLD AUTO: 9.6 FL (ref 9.2–12.9)
POTASSIUM SERPL-SCNC: 4.4 MMOL/L (ref 3.5–5.1)
PROT SERPL-MCNC: 7.4 G/DL (ref 6–8.4)
RBC # BLD AUTO: 4.45 M/UL (ref 4–5.4)
SODIUM SERPL-SCNC: 139 MMOL/L (ref 136–145)
T4 FREE SERPL-MCNC: 0.86 NG/DL (ref 0.71–1.51)
TSH SERPL DL<=0.005 MIU/L-ACNC: 0.03 UIU/ML (ref 0.4–4)
WBC # BLD AUTO: 6.69 K/UL (ref 3.9–12.7)

## 2022-12-29 PROCEDURE — 83525 ASSAY OF INSULIN: CPT | Performed by: REGISTERED NURSE

## 2022-12-29 PROCEDURE — 3079F PR MOST RECENT DIASTOLIC BLOOD PRESSURE 80-89 MM HG: ICD-10-PCS | Mod: CPTII,S$GLB,, | Performed by: REGISTERED NURSE

## 2022-12-29 PROCEDURE — 99999 PR PBB SHADOW E&M-EST. PATIENT-LVL IV: CPT | Mod: PBBFAC,,, | Performed by: REGISTERED NURSE

## 2022-12-29 PROCEDURE — 3288F PR FALLS RISK ASSESSMENT DOCUMENTED: ICD-10-PCS | Mod: CPTII,S$GLB,, | Performed by: REGISTERED NURSE

## 2022-12-29 PROCEDURE — G0009 PNEUMOCOCCAL CONJUGATE VACCINE 20-VALENT: ICD-10-PCS | Mod: S$GLB,,, | Performed by: REGISTERED NURSE

## 2022-12-29 PROCEDURE — 1159F MED LIST DOCD IN RCRD: CPT | Mod: CPTII,S$GLB,, | Performed by: REGISTERED NURSE

## 2022-12-29 PROCEDURE — 1126F AMNT PAIN NOTED NONE PRSNT: CPT | Mod: CPTII,S$GLB,, | Performed by: REGISTERED NURSE

## 2022-12-29 PROCEDURE — 80053 COMPREHEN METABOLIC PANEL: CPT | Performed by: REGISTERED NURSE

## 2022-12-29 PROCEDURE — 99999 PR PBB SHADOW E&M-EST. PATIENT-LVL IV: ICD-10-PCS | Mod: PBBFAC,,, | Performed by: REGISTERED NURSE

## 2022-12-29 PROCEDURE — 3008F PR BODY MASS INDEX (BMI) DOCUMENTED: ICD-10-PCS | Mod: CPTII,S$GLB,, | Performed by: REGISTERED NURSE

## 2022-12-29 PROCEDURE — 1159F PR MEDICATION LIST DOCUMENTED IN MEDICAL RECORD: ICD-10-PCS | Mod: CPTII,S$GLB,, | Performed by: REGISTERED NURSE

## 2022-12-29 PROCEDURE — 90677 PNEUMOCOCCAL CONJUGATE VACCINE 20-VALENT: ICD-10-PCS | Mod: S$GLB,,, | Performed by: REGISTERED NURSE

## 2022-12-29 PROCEDURE — 3288F FALL RISK ASSESSMENT DOCD: CPT | Mod: CPTII,S$GLB,, | Performed by: REGISTERED NURSE

## 2022-12-29 PROCEDURE — 85025 COMPLETE CBC W/AUTO DIFF WBC: CPT | Performed by: REGISTERED NURSE

## 2022-12-29 PROCEDURE — 3077F PR MOST RECENT SYSTOLIC BLOOD PRESSURE >= 140 MM HG: ICD-10-PCS | Mod: CPTII,S$GLB,, | Performed by: REGISTERED NURSE

## 2022-12-29 PROCEDURE — 1126F PR PAIN SEVERITY QUANTIFIED, NO PAIN PRESENT: ICD-10-PCS | Mod: CPTII,S$GLB,, | Performed by: REGISTERED NURSE

## 2022-12-29 PROCEDURE — 3079F DIAST BP 80-89 MM HG: CPT | Mod: CPTII,S$GLB,, | Performed by: REGISTERED NURSE

## 2022-12-29 PROCEDURE — 3008F BODY MASS INDEX DOCD: CPT | Mod: CPTII,S$GLB,, | Performed by: REGISTERED NURSE

## 2022-12-29 PROCEDURE — 1101F PR PT FALLS ASSESS DOC 0-1 FALLS W/OUT INJ PAST YR: ICD-10-PCS | Mod: CPTII,S$GLB,, | Performed by: REGISTERED NURSE

## 2022-12-29 PROCEDURE — 83036 HEMOGLOBIN GLYCOSYLATED A1C: CPT | Performed by: REGISTERED NURSE

## 2022-12-29 PROCEDURE — 90677 PCV20 VACCINE IM: CPT | Mod: S$GLB,,, | Performed by: REGISTERED NURSE

## 2022-12-29 PROCEDURE — 3077F SYST BP >= 140 MM HG: CPT | Mod: CPTII,S$GLB,, | Performed by: REGISTERED NURSE

## 2022-12-29 PROCEDURE — 99397 PR PREVENTIVE VISIT,EST,65 & OVER: ICD-10-PCS | Mod: 25,S$GLB,, | Performed by: REGISTERED NURSE

## 2022-12-29 PROCEDURE — 36415 COLL VENOUS BLD VENIPUNCTURE: CPT | Mod: PO | Performed by: REGISTERED NURSE

## 2022-12-29 PROCEDURE — G0009 ADMIN PNEUMOCOCCAL VACCINE: HCPCS | Mod: S$GLB,,, | Performed by: REGISTERED NURSE

## 2022-12-29 PROCEDURE — 1101F PT FALLS ASSESS-DOCD LE1/YR: CPT | Mod: CPTII,S$GLB,, | Performed by: REGISTERED NURSE

## 2022-12-29 PROCEDURE — 84443 ASSAY THYROID STIM HORMONE: CPT | Performed by: REGISTERED NURSE

## 2022-12-29 PROCEDURE — 84439 ASSAY OF FREE THYROXINE: CPT | Performed by: REGISTERED NURSE

## 2022-12-29 PROCEDURE — 99397 PER PM REEVAL EST PAT 65+ YR: CPT | Mod: 25,S$GLB,, | Performed by: REGISTERED NURSE

## 2022-12-29 RX ORDER — BUTALBITAL, ACETAMINOPHEN AND CAFFEINE 50; 325; 40 MG/1; MG/1; MG/1
1 TABLET ORAL
Qty: 60 TABLET | Refills: 2 | Status: SHIPPED | OUTPATIENT
Start: 2022-12-29 | End: 2023-09-25 | Stop reason: SDUPTHER

## 2022-12-29 RX ORDER — PAROXETINE 10 MG/1
10 TABLET, FILM COATED ORAL EVERY MORNING
Qty: 90 TABLET | Refills: 1 | Status: SHIPPED | OUTPATIENT
Start: 2022-12-29 | End: 2023-02-20

## 2022-12-29 NOTE — PROGRESS NOTES
Subjective:      Yolis Bourgeois is a 67 y.o. female, to the office today for:   ANNUAL WELLNESS    HPI:    Yolis Bourgeois has fasted to have bloodwork done today.  I have reviewed the patient's medical history in detail and updated the computerized patient record.      Health Maintenance Due   Topic Date Due    Shingles Vaccine (2 of 3) 12/25/2015 --- get at pharmacy    Pneumococcal Vaccines (Age 65+) (2 - PCV) 12/17/2021 --- due for Prevnar-20       Review of Systems   Constitutional:  Positive for activity change and fatigue. Negative for appetite change, chills, diaphoresis and fever.        Wt Readings from Last 3 Encounters:  12/29/22 0847 : 84.8 kg (186 lb 13.4 oz)  08/18/22 0806 : 83.2 kg (183 lb 6.8 oz)  05/24/22 1500 : 84.1 kg (185 lb 6.5 oz)  Weight stable.  Appetite changes, no exercise.  Chronic fatigue, no focusing --- taking care of .   HENT: Negative.     Eyes:  Negative for discharge and visual disturbance.   Respiratory:  Negative for cough, shortness of breath and wheezing.    Cardiovascular:  Negative for chest pain, palpitations and leg swelling.   Gastrointestinal: Negative.    Endocrine: Positive for hypothyroidism. Negative for diabetes.        Reports increased thirst and urination.  Father with h/o DM.  Reports going for long periods of time w/out eating with s/s low blood sugar --- shakes, sweats, crankiness.  Does eat a lot of carbs for energy as not sleeping well, taking care of .   Genitourinary:  Positive for bladder incontinence (stress UI 2/t cough/sneeze/laugh). Negative for dysuria, flank pain, hematuria and pelvic pain.        Followed by Urology with yearly kidney scan, followed for renal cyst.   Musculoskeletal:  Positive for arthralgias (shoulder, hip) and back pain.   Integumentary:  Negative for rash and mole/lesion.   Neurological:  Positive for headaches (takes Fioricet with good results, takes sparingly). Negative for vertigo, seizures, syncope and numbness.    Hematological: Negative.    Psychiatric/Behavioral:  Positive for sleep disturbance. Negative for depression. The patient is nervous/anxious.    Breast: negative.        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    Primary open angle glaucoma (POAG) of both eyes, mild stage    Renal cyst           Current Outpatient Medications:     aspirin (ECOTRIN) 81 MG EC tablet, Take 81 mg by mouth once daily., Disp: , Rfl:     benzonatate (TESSALON) 100 MG capsule, Take 1-2 capsules (100-200 mg total) by mouth 3 (three) times daily as needed for Cough., Disp: 45 capsule, Rfl: 0    butalbital-acetaminophen-caffeine -40 mg (FIORICET, ESGIC) -40 mg per tablet, Take 1 tablet by mouth every 4 to 6 hours as needed for Headaches., Disp: 60 tablet, Rfl: 2    cycloSPORINE (RESTASIS) 0.05 % ophthalmic emulsion, Place 1 drop into both eyes 2 (two) times daily., Disp: 60 each, Rfl: 12    finasteride (PROPECIA) 1 mg tablet, Take 1 mg by mouth every other day. , Disp: , Rfl:     latanoprost 0.005 % ophthalmic solution, place 1 drop IN EACH EYE EVERY EVENING, Disp: 2.5 mL, Rfl: 6    levocetirizine (XYZAL) 5 MG tablet, Take 1 tablet (5 mg total) by mouth every evening., Disp: 30 tablet, Rfl: 11    metoprolol tartrate (LOPRESSOR) 25 MG tablet, Take 25 mg by mouth 2 (two) times daily. , Disp: , Rfl: 9    montelukast (SINGULAIR) 10 mg tablet, Take 10 mg by mouth once daily., Disp: , Rfl:     multivitamin with minerals Cap, Take 2 capsules by mouth Daily., Disp: , Rfl:     pantoprazole (PROTONIX) 40 MG tablet, Take 1 tablet (40 mg total) by mouth once  daily., Disp: 30 tablet, Rfl: 11    thyroid, pork, (ARMOUR THYROID) 90 mg Tab, Take 1 tablet by mouth before breakfast., Disp: , Rfl:       Past Medical History:   Diagnosis Date    Chronic constipation     Depression     Dry eyes     Endometriosis of broad ligament     Hypothyroidism     Macular degeneration     Migraine headache     Osteoarthritis of hand     PAF (paroxysmal atrial fibrillation)     Primary open angle glaucoma (POAG) of both eyes, mild stage 2018    Seasonal allergic rhinitis        Past Surgical History:   Procedure Laterality Date    ADENOIDECTOMY      CERVICAL CONIZATION   W/ LASER      COLONOSCOPY N/A 2022    Procedure: COLONOSCOPY;  Surgeon: Seamus South MD;  Location: [facility] ENDO;  Service: General;  Laterality: N/A;    DILATION AND CURETTAGE OF UTERUS      EYE SURGERY  2020    Glaucoma surgery    LASIK      LIPOMA RESECTION      right thigh    SLT - OU - BOTH EYES Bilateral 2020    TONSILLECTOMY      TOTAL ABDOMINAL HYSTERECTOMY W/ BILATERAL SALPINGOOPHORECTOMY  2008    TUBAL LIGATION  1980       Family History   Problem Relation Age of Onset    Hypertension Mother     Heart disease Mother         AFib    Dementia Mother     Depression Mother     Vision loss Mother         [place]    Diabetes Father     Hypertension Father     Heart disease Father         Congestive heart failure    Heart failure Father     Arthritis Father     Glaucoma Maternal Uncle     No Known Problems Brother        Social History     Tobacco Use    Smoking status: Former     Packs/day: 1.00     Years: 15.00     Pack years: 15.00     Types: Cigarettes     Quit date: 1983     Years since quittin.0    Smokeless tobacco: Never    Tobacco comments:     quit 33 years ago    Substance Use Topics    Alcohol use: Yes     Comment: Occasionally    Drug use: No         Objective:     Vitals:    22 0847   BP: (!) 144/86   Pulse: 88   Temp: 97.6 °F (36.4 °C)   SpO2: 97%  "  Weight: 84.8 kg (186 lb 13.4 oz)   Height: 5' 3" (1.6 m)       Body mass index is 33.1 kg/m².      Physical Exam  Constitutional:       General: She is not in acute distress.     Appearance: She is well-developed. She is not diaphoretic.   HENT:      Head: Normocephalic and atraumatic.      Right Ear: Tympanic membrane, ear canal and external ear normal. There is no impacted cerumen.      Left Ear: Tympanic membrane, ear canal and external ear normal. There is no impacted cerumen.      Nose: Nose normal. No nasal deformity, mucosal edema, congestion or rhinorrhea.      Mouth/Throat:      Mouth: Mucous membranes are moist. Mucous membranes are not dry and not cyanotic. No oral lesions.      Dentition: Normal dentition.      Pharynx: Oropharynx is clear. Uvula midline. No oropharyngeal exudate, posterior oropharyngeal erythema or uvula swelling.      Tonsils: No tonsillar abscesses.   Eyes:      General: Lids are normal. Lids are everted, no foreign bodies appreciated. No scleral icterus.        Right eye: No discharge.         Left eye: No discharge.      Conjunctiva/sclera: Conjunctivae normal.      Right eye: Right conjunctiva is not injected. No exudate.     Left eye: Left conjunctiva is not injected. No exudate.     Pupils: Pupils are equal, round, and reactive to light.   Neck:      Thyroid: No thyroid mass or thyromegaly.      Vascular: No carotid bruit or JVD.      Trachea: No tracheal deviation.   Cardiovascular:      Rate and Rhythm: Normal rate and regular rhythm.      Pulses: Normal pulses.      Heart sounds: Normal heart sounds. No murmur heard.    No friction rub. No gallop.   Pulmonary:      Effort: Pulmonary effort is normal. No respiratory distress.      Breath sounds: Normal breath sounds. No stridor. No wheezing.   Chest:      Chest wall: No tenderness.   Abdominal:      General: Bowel sounds are normal. There is no distension.      Palpations: Abdomen is soft. There is no mass.      Tenderness: " There is no abdominal tenderness. There is no guarding or rebound.   Genitourinary:     Comments: Deferred//hysterectomy  Musculoskeletal:         General: No swelling, tenderness or deformity. Normal range of motion.      Cervical back: Normal range of motion and neck supple. No edema or erythema. Normal range of motion.   Lymphadenopathy:      Cervical: No cervical adenopathy.   Skin:     General: Skin is warm and dry.      Capillary Refill: Capillary refill takes less than 2 seconds.      Coloration: Skin is not pale.      Findings: No bruising, erythema or rash.   Neurological:      Mental Status: She is alert and oriented to person, place, and time.      Sensory: No sensory deficit.      Motor: No weakness, tremor, atrophy or abnormal muscle tone.      Coordination: Coordination normal.      Gait: Gait normal.      Deep Tendon Reflexes: Reflexes are normal and symmetric.   Psychiatric:         Mood and Affect: Mood normal.         Speech: Speech normal.         Behavior: Behavior normal.         Thought Content: Thought content normal.         Cognition and Memory: Memory is not impaired.         Judgment: Judgment normal.       LABS REVIEWED:    Lab Results   Component Value Date    WBC 5.95 03/01/2022    RBC 4.69 03/01/2022    HGB 14.7 03/01/2022    HCT 44.4 03/01/2022    MCV 95 03/01/2022    MCH 31.3 (H) 03/01/2022    MCHC 33.1 03/01/2022    RDW 12.1 03/01/2022     03/01/2022    MPV 9.6 03/01/2022    GRAN 3.6 03/01/2022    GRAN 60.6 03/01/2022    LYMPH 1.7 03/01/2022    LYMPH 27.9 03/01/2022    MONO 0.6 03/01/2022    MONO 9.2 03/01/2022    EOS 0.1 03/01/2022    BASO 0.03 03/01/2022    EOSINOPHIL 1.5 03/01/2022    BASOPHIL 0.5 03/01/2022       Sodium   Date Value Ref Range Status   03/01/2022 137 136 - 145 mmol/L Final     Potassium   Date Value Ref Range Status   03/01/2022 4.4 3.5 - 5.1 mmol/L Final     Chloride   Date Value Ref Range Status   03/01/2022 101 95 - 110 mmol/L Final     CO2   Date Value  Ref Range Status   03/01/2022 26 23 - 29 mmol/L Final     Glucose   Date Value Ref Range Status   03/01/2022 102 70 - 110 mg/dL Final     BUN   Date Value Ref Range Status   03/01/2022 15 8 - 23 mg/dL Final     Creatinine   Date Value Ref Range Status   03/01/2022 0.8 0.5 - 1.4 mg/dL Final     Calcium   Date Value Ref Range Status   03/01/2022 9.8 8.7 - 10.5 mg/dL Final     Total Protein   Date Value Ref Range Status   03/01/2022 7.9 6.0 - 8.4 g/dL Final     Albumin   Date Value Ref Range Status   03/01/2022 4.2 3.5 - 5.2 g/dL Final     Total Bilirubin   Date Value Ref Range Status   03/01/2022 0.6 0.1 - 1.0 mg/dL Final     Comment:     For infants and newborns, interpretation of results should be based  on gestational age, weight and in agreement with clinical  observations.    Premature Infant recommended reference ranges:  Up to 24 hours.............<8.0 mg/dL  Up to 48 hours............<12.0 mg/dL  3-5 days..................<15.0 mg/dL  6-29 days.................<15.0 mg/dL       Alkaline Phosphatase   Date Value Ref Range Status   03/01/2022 65 55 - 135 U/L Final     AST   Date Value Ref Range Status   03/01/2022 22 10 - 40 U/L Final     ALT   Date Value Ref Range Status   03/01/2022 26 10 - 44 U/L Final     Anion Gap   Date Value Ref Range Status   03/01/2022 10 8 - 16 mmol/L Final       eGFR if non    Date Value Ref Range Status   03/01/2022 >60.0 >60 mL/min/1.73 m^2 Final     Comment:     Calculation used to obtain the estimated glomerular filtration  rate (eGFR) is the CKD-EPI equation.             Lab Results   Component Value Date    CHOL 184 03/01/2022     Lab Results   Component Value Date    TRIG 107 03/01/2022     Lab Results   Component Value Date    HDL 59 03/01/2022     Lab Results   Component Value Date    LDLCALC 103.6 03/01/2022       Lab Results   Component Value Date    TSH 0.022 (L) 12/18/2020    FREET4 0.76 12/18/2020       Lab Results   Component Value Date    HGBA1C 5.4  01/29/2020       Insulin   Date Value Ref Range Status   01/29/2020 6.6 <25.0 uU/mL Final       Lab Results   Component Value Date    IJFFTSQN44 855 10/05/2010       Lab Results   Component Value Date    JJS52FILZ Negative 12/18/2020       Lab Results   Component Value Date    HEPAIGM Negative 09/12/2014    HEPBIGM Negative 09/12/2014    HEPCAB Negative 09/12/2014       Diagnosis/Assessment:     1. Preventative health care --- lipids deferred (last checked 3/2022)  - (In Office Administered) Pneumococcal Conjugate Vaccine (20 Valent) (IM)  - CBC Auto Differential; Future  - Comprehensive Metabolic Panel; Future  - TSH; Future  - Hemoglobin A1C; Future  - Insulin, Random; Future    Lab Results   Component Value Date    CHOL 184 03/01/2022     Lab Results   Component Value Date    HDL 59 03/01/2022     Lab Results   Component Value Date    LDLCALC 103.6 03/01/2022     Lab Results   Component Value Date    TRIG 107 03/01/2022         2. Paroxysmal atrial fibrillation -- stable on BB, followed by Cardiology  - Comprehensive Metabolic Panel; Future    3. Acquired hypothyroidism --- on thyroid med as ordered, check lab  - TSH; Future    4. Adjustment disorder with anxious mood --- uncontrolled, restart Paxil at low dose and monitor  - paroxetine (PAXIL) 10 MG tablet; Take 1 tablet (10 mg total) by mouth every morning.  Dispense: 90 tablet; Refill: 1    5. Migraine without status migrainosus, not intractable, unspecified migraine type --- refilled  - butalbital-acetaminophen-caffeine -40 mg (FIORICET, ESGIC) -40 mg per tablet; Take 1 tablet by mouth every 4 to 6 hours as needed for Headaches.  Dispense: 60 tablet; Refill: 2  - CBC Auto Differential; Future  - Comprehensive Metabolic Panel; Future  - TSH; Future  - Hemoglobin A1C; Future  - Insulin, Random; Future    6. Class 1 obesity with body mass index (BMI) of 33.0 to 33.9 in adult, unspecified obesity type, unspecified whether serious comorbidity  present  Healthy dietary & lifestyle changes discussed  - CBC Auto Differential; Future  - Comprehensive Metabolic Panel; Future  - TSH; Future  - Hemoglobin A1C; Future  - Insulin, Random; Future    7. Family history of diabetes mellitus (DM)  - Hemoglobin A1C; Future  - Insulin, Random; Future    8. Need for pneumococcal vaccination  - (In Office Administered) Pneumococcal Conjugate Vaccine (20 Valent) (IM)       Plan:     Mammogram due on/after 3/1/2023.      Follow-Up:     Pending lab.  Recheck in 1 to 2 months --- dx # 4  RTC as directed or prn.        FAHAD Blackburn  [facility]       Patient Care Team:  Dana Johnson MD as PCP - General (Family Medicine)  Rufino Abbasi MD as Consulting Physician (Cardiology)  Panfilo Light MD as Consulting Physician (Urology)  Leonel Bosch MD as Consulting Physician (Ophthalmology)      Future Appointments   Date Time Provider Department Center   1/6/2023  9:15 AM Leonel Bosch MD [facility] OPH[facility]   5/19/2023 10:00 [address] [facility]   5/26/2023  2:00 PM Panfilo Light MD [facility] UROLOGY [facility]

## 2023-01-06 ENCOUNTER — OFFICE VISIT (OUTPATIENT)
Dept: OPHTHALMOLOGY | Facility: CLINIC | Age: 68
End: 2023-01-06
Payer: MEDICARE

## 2023-01-06 DIAGNOSIS — H40.1131 PRIMARY OPEN ANGLE GLAUCOMA (POAG) OF BOTH EYES, MILD STAGE: Primary | ICD-10-CM

## 2023-01-06 DIAGNOSIS — M35.01 KERATITIS SICCA, BILATERAL: ICD-10-CM

## 2023-01-06 DIAGNOSIS — H18.832 RECURRENT CORNEAL EROSION, LEFT: ICD-10-CM

## 2023-01-06 DIAGNOSIS — Z98.890 HISTORY OF REFRACTIVE SURGERY: ICD-10-CM

## 2023-01-06 PROCEDURE — 99214 PR OFFICE/OUTPT VISIT, EST, LEVL IV, 30-39 MIN: ICD-10-PCS | Mod: S$GLB,,, | Performed by: OPHTHALMOLOGY

## 2023-01-06 PROCEDURE — 99999 PR PBB SHADOW E&M-EST. PATIENT-LVL III: ICD-10-PCS | Mod: PBBFAC,,, | Performed by: OPHTHALMOLOGY

## 2023-01-06 PROCEDURE — 99214 OFFICE O/P EST MOD 30 MIN: CPT | Mod: S$GLB,,, | Performed by: OPHTHALMOLOGY

## 2023-01-06 PROCEDURE — 99999 PR PBB SHADOW E&M-EST. PATIENT-LVL III: CPT | Mod: PBBFAC,,, | Performed by: OPHTHALMOLOGY

## 2023-01-06 PROCEDURE — 1159F PR MEDICATION LIST DOCUMENTED IN MEDICAL RECORD: ICD-10-PCS | Mod: CPTII,S$GLB,, | Performed by: OPHTHALMOLOGY

## 2023-01-06 PROCEDURE — 1159F MED LIST DOCD IN RCRD: CPT | Mod: CPTII,S$GLB,, | Performed by: OPHTHALMOLOGY

## 2023-01-06 NOTE — PROGRESS NOTES
HPI     Glaucoma            Comments: 4 month IOP check     Patient states OU feels dry at times but no pain or irritation.          Comments    1. Anatomic variant mac degen  2. Cobblestone od  3. Dry eyes  Punctal plugs ou  4. H/O Lasik  5. H/O Iritis os  HVF 3/18/19  6.poag  7.rce od  8. SLT OU 6-11-20    Restasis BID OU  Latanoprost QHS OU  Systane Hydration BID OU   Refresh Yan tears BID          Last edited by Miriam Mcgovern, Patient Care Assistant on 1/6/2023  9:19 AM.              Assessment /Plan     For exam results, see Encounter Report.      ICD-10-CM ICD-9-CM    1. Primary open angle glaucoma (POAG) of both eyes, mild stage  H40.1131 365.11 Doing well - intraocular pressure is within acceptable range relative to target pressure with no evidence of progression.   Continue current treatment.  Reviewed importance of continued compliance with treatment and follow up.        365.71       2. Keratitis sicca, bilateral  M35.01 370.8 Seems improved today on exam- follow       3. Recurrent corneal erosion, left  H18.832 371.42 H/o- follow       4. History of refractive surgery  Z98.890 V45.69           RETURN TO CLINIC 6 months HVF, GOCT AND HAVE MGM CHECK K FOR SENSITIVITY TEST PRIOR TO DILATION     Restasis BID OU  Latanoprost QHS OU  Systane Hydration BID OU   Refresh Yan tears BID

## 2023-01-10 ENCOUNTER — PATIENT MESSAGE (OUTPATIENT)
Dept: OTOLARYNGOLOGY | Facility: CLINIC | Age: 68
End: 2023-01-10
Payer: MEDICARE

## 2023-02-09 ENCOUNTER — OFFICE VISIT (OUTPATIENT)
Dept: FAMILY MEDICINE | Facility: CLINIC | Age: 68
End: 2023-02-09
Payer: MEDICARE

## 2023-02-09 VITALS
WEIGHT: 185.06 LBS | DIASTOLIC BLOOD PRESSURE: 88 MMHG | BODY MASS INDEX: 32.79 KG/M2 | OXYGEN SATURATION: 97 % | HEIGHT: 63 IN | HEART RATE: 64 BPM | TEMPERATURE: 98 F | SYSTOLIC BLOOD PRESSURE: 150 MMHG

## 2023-02-09 DIAGNOSIS — F43.23 ADJUSTMENT DISORDER WITH MIXED ANXIETY AND DEPRESSED MOOD: ICD-10-CM

## 2023-02-09 DIAGNOSIS — I10 ESSENTIAL HYPERTENSION: Primary | ICD-10-CM

## 2023-02-09 DIAGNOSIS — I48.0 PAROXYSMAL ATRIAL FIBRILLATION: Chronic | ICD-10-CM

## 2023-02-09 PROCEDURE — 1159F PR MEDICATION LIST DOCUMENTED IN MEDICAL RECORD: ICD-10-PCS | Mod: CPTII,S$GLB,, | Performed by: FAMILY MEDICINE

## 2023-02-09 PROCEDURE — 1126F AMNT PAIN NOTED NONE PRSNT: CPT | Mod: CPTII,S$GLB,, | Performed by: FAMILY MEDICINE

## 2023-02-09 PROCEDURE — 1160F RVW MEDS BY RX/DR IN RCRD: CPT | Mod: CPTII,S$GLB,, | Performed by: FAMILY MEDICINE

## 2023-02-09 PROCEDURE — 1126F PR PAIN SEVERITY QUANTIFIED, NO PAIN PRESENT: ICD-10-PCS | Mod: CPTII,S$GLB,, | Performed by: FAMILY MEDICINE

## 2023-02-09 PROCEDURE — 3288F PR FALLS RISK ASSESSMENT DOCUMENTED: ICD-10-PCS | Mod: CPTII,S$GLB,, | Performed by: FAMILY MEDICINE

## 2023-02-09 PROCEDURE — 99999 PR PBB SHADOW E&M-EST. PATIENT-LVL IV: ICD-10-PCS | Mod: PBBFAC,,, | Performed by: FAMILY MEDICINE

## 2023-02-09 PROCEDURE — 99214 OFFICE O/P EST MOD 30 MIN: CPT | Mod: S$GLB,,, | Performed by: FAMILY MEDICINE

## 2023-02-09 PROCEDURE — 3077F SYST BP >= 140 MM HG: CPT | Mod: CPTII,S$GLB,, | Performed by: FAMILY MEDICINE

## 2023-02-09 PROCEDURE — 1160F PR REVIEW ALL MEDS BY PRESCRIBER/CLIN PHARMACIST DOCUMENTED: ICD-10-PCS | Mod: CPTII,S$GLB,, | Performed by: FAMILY MEDICINE

## 2023-02-09 PROCEDURE — 1101F PT FALLS ASSESS-DOCD LE1/YR: CPT | Mod: CPTII,S$GLB,, | Performed by: FAMILY MEDICINE

## 2023-02-09 PROCEDURE — 99999 PR PBB SHADOW E&M-EST. PATIENT-LVL IV: CPT | Mod: PBBFAC,,, | Performed by: FAMILY MEDICINE

## 2023-02-09 PROCEDURE — 3077F PR MOST RECENT SYSTOLIC BLOOD PRESSURE >= 140 MM HG: ICD-10-PCS | Mod: CPTII,S$GLB,, | Performed by: FAMILY MEDICINE

## 2023-02-09 PROCEDURE — 3079F DIAST BP 80-89 MM HG: CPT | Mod: CPTII,S$GLB,, | Performed by: FAMILY MEDICINE

## 2023-02-09 PROCEDURE — 1159F MED LIST DOCD IN RCRD: CPT | Mod: CPTII,S$GLB,, | Performed by: FAMILY MEDICINE

## 2023-02-09 PROCEDURE — 3079F PR MOST RECENT DIASTOLIC BLOOD PRESSURE 80-89 MM HG: ICD-10-PCS | Mod: CPTII,S$GLB,, | Performed by: FAMILY MEDICINE

## 2023-02-09 PROCEDURE — 3008F BODY MASS INDEX DOCD: CPT | Mod: CPTII,S$GLB,, | Performed by: FAMILY MEDICINE

## 2023-02-09 PROCEDURE — 3008F PR BODY MASS INDEX (BMI) DOCUMENTED: ICD-10-PCS | Mod: CPTII,S$GLB,, | Performed by: FAMILY MEDICINE

## 2023-02-09 PROCEDURE — 3288F FALL RISK ASSESSMENT DOCD: CPT | Mod: CPTII,S$GLB,, | Performed by: FAMILY MEDICINE

## 2023-02-09 PROCEDURE — 1101F PR PT FALLS ASSESS DOC 0-1 FALLS W/OUT INJ PAST YR: ICD-10-PCS | Mod: CPTII,S$GLB,, | Performed by: FAMILY MEDICINE

## 2023-02-09 PROCEDURE — 99214 PR OFFICE/OUTPT VISIT, EST, LEVL IV, 30-39 MIN: ICD-10-PCS | Mod: S$GLB,,, | Performed by: FAMILY MEDICINE

## 2023-02-09 RX ORDER — VALSARTAN AND HYDROCHLOROTHIAZIDE 160; 12.5 MG/1; MG/1
1 TABLET, FILM COATED ORAL DAILY
Qty: 90 TABLET | Refills: 2 | Status: SHIPPED | OUTPATIENT
Start: 2023-02-09 | End: 2023-11-10 | Stop reason: SDUPTHER

## 2023-02-09 NOTE — PROGRESS NOTES
CHIEF COMPLAINT: This is a 67-year-old female complains of elevated blood pressure.     SUBJECTIVE:  Patient has been having elevated BP readings for several months. She reports systolics in the 150-160s over 80s. She is followed by Cardiology for PAF and palpitations for which she takes metoprolol 12.5 mg twice daily. She complains of headaches and occasional dizziness. She denies chest pain, palpitations, lightheadedness or syncope. She has a great deal of situational stress related to 's illness. She takes Paxil 20 mg daily for depression with anxiety.  She is taking pork thyroid for hypothyroidism per OB-GYN and finasteride for hair loss. The patient has migraine headaches and takes Fioricet as needed.      ROS:  GENERAL: Patient denies fever, chills, night sweats.  Patient denies weight loss. Patient denies anorexia, fatigue, weakness or swollen glands.  SKIN: Patient denies rash or hair loss.  HEENT: Patient denies sore throat, ear pain, hearing loss, nasal congestion, or runny nose. Patient denies visual disturbance, eye irritation or discharge.  LUNGS: Patient denies cough, wheeze or hemoptysis.  CARDIOVASCULAR: Patient denies chest pain, shortness of breath, palpitations, syncope or lower extremity edema.  GI: Patient denies abdominal pain, nausea, vomiting, diarrhea, constipation, blood in stool or melena.  GENITOURINARY:  Patient denies dysuria, frequency, hematuria, nocturia, urgency or incontinence.  MUSCULOSKELETAL: Patient denies joint swelling, redness or warmth.  NEUROLOGIC: Patient denies headache, vertigo, paresthesias, weakness in limb, dysarthria, dysphagia or abnormality of gait.  PSYCHIATRIC: Patient denies memory loss.  Positive for depression and anxiety.     OBJECTIVE:   GENERAL: Well-developed well-nourished obese white female alert and oriented x3, in no acute distress.  Memory, judgment and cognition without deficit.   SKIN: Clear without rash.  Normal color and tone.  HEENT:  Eyes: Clear conjunctivae. No scleral icterus.    NECK: Supple, normal range of motion.  No masses, lymphadenopathy or enlarged thyroid.  No JVD.  Carotids 2+ and equal.  No bruits.  LUNGS: Clear to auscultation.  Normal respiratory effort.  CARDIOVASCULAR:  Regular rhythm, normal S1, S2 without murmur, gallop or rub.  EXTREMITIES: Without cyanosis, clubbing or edema.  Distal pulses 2+ and equal.  Normal range of motion in all extremities.  No joint effusion, erythema or warmth.  NEUROLOGIC: Gait without abnormality.  No tremor.      ASSESSMENT:  1. Essential hypertension    2. Paroxysmal atrial fibrillation    3. Adjustment disorder with mixed anxiety and depressed mood      PLAN:  1.  Weight reduction.   2.  Exercise 150 minutes per week.  3.  Limit salt in diet.  4.  Add valsartan -12.5 mg daily.  5.  Monitor blood pressure.  Report readings in 2-3 weeks.  6.  Follow-up if no improvement or worsening symptoms.    30 minutes of total time spent on the encounter, which includes face to face time and non-face to face time preparing to see the patient (eg, review of tests), Obtaining and/or reviewing separately obtained history, Documenting clinical information in the electronic or other health record, Independently interpreting results (not separately reported) and communicating results to the patient/family/caregiver, or Care coordination (not separately reported).     This note is generated with speech recognition software and is subject to transcription error and sound alike phrases that may be missed by proofreading.

## 2023-02-20 ENCOUNTER — PATIENT MESSAGE (OUTPATIENT)
Dept: FAMILY MEDICINE | Facility: CLINIC | Age: 68
End: 2023-02-20
Payer: MEDICARE

## 2023-02-20 DIAGNOSIS — F43.22 ADJUSTMENT DISORDER WITH ANXIOUS MOOD: Chronic | ICD-10-CM

## 2023-02-20 RX ORDER — PAROXETINE HYDROCHLORIDE 20 MG/1
20 TABLET, FILM COATED ORAL EVERY MORNING
Qty: 90 TABLET | Refills: 3 | Status: SHIPPED | OUTPATIENT
Start: 2023-02-20 | End: 2023-08-11

## 2023-02-27 ENCOUNTER — PATIENT MESSAGE (OUTPATIENT)
Dept: FAMILY MEDICINE | Facility: CLINIC | Age: 68
End: 2023-02-27
Payer: MEDICARE

## 2023-02-28 NOTE — PROGRESS NOTES
"Subjective:      Yolis Bourgeois is a 67 y.o. female, here today for routine follow-up, est patient of Dana Johnson MD    HPI:    Yolis Bourgeois is here for routine follow-up.  Last seen by PCP: 2/9/2023  I have reviewed and updated electronic medical record as indicated.      Review of Systems   Constitutional:  Negative for activity change, appetite change, chills, diaphoresis, fatigue and fever.   HENT: Negative.     Eyes:  Negative for photophobia, pain and visual disturbance.   Respiratory:  Negative for cough, chest tightness, shortness of breath and wheezing.    Cardiovascular:  Negative for chest pain, palpitations and leg swelling.        HTN -- on med as ordered, reports home checks ok but not able to report numbers.  Does report bp goes up in times of stress.   Gastrointestinal:  Negative for abdominal distention, abdominal pain, anal bleeding, blood in stool, constipation, diarrhea, nausea, rectal pain and vomiting.   Endocrine: Negative for polydipsia, polyphagia and polyuria.   Genitourinary: Negative.    Musculoskeletal: Negative.    Skin:  Negative for rash and wound.   Neurological:  Positive for headaches (much improved, tx with Fioricet (rarely takes)). Negative for dizziness, tremors, seizures, syncope, facial asymmetry, speech difficulty, weakness, light-headedness and numbness.   Hematological:  Negative for adenopathy. Does not bruise/bleed easily.   Psychiatric/Behavioral:          Chronic issues w/ anxiety/stress, states "I had a crazy morning".   recovering from recent toe amputation, along with chronic medical issues.  Does take Paxil 20 mg daily, works well.  Sleeping well, helps her to focus/concentration and keep her "in check".       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    Primary open angle glaucoma (POAG) of both eyes, mild stage    Renal cyst    Class 1 obesity with body mass index (BMI) of 33.0 to 33.9 in adult         Current Outpatient Medications:     aspirin (ECOTRIN) 81 MG EC tablet, Take 81 mg by mouth once daily., Disp: , Rfl:     butalbital-acetaminophen-caffeine -40 mg (FIORICET, ESGIC) -40 mg per tablet, Take 1 tablet by mouth every 4 to 6 hours as needed for Headaches., Disp: 60 tablet, Rfl: 2    cycloSPORINE (RESTASIS) 0.05 % ophthalmic emulsion, Place 1 drop into both eyes 2 (two) times daily., Disp: 60 each, Rfl: 12    finasteride (PROPECIA) 1 mg tablet, Take 1 mg by mouth every other day. , Disp: , Rfl:     latanoprost 0.005 % ophthalmic solution, place 1 drop IN EACH EYE EVERY EVENING, Disp: 2.5 mL, Rfl: 6    metoprolol tartrate (LOPRESSOR) 25 MG tablet, Take 25 mg by mouth 2 (two) times daily. , Disp: , Rfl: 9    multivitamin with minerals Cap, Take 2 capsules by mouth Daily., Disp: , Rfl:     pantoprazole (PROTONIX) 40 MG tablet, Take 1 tablet (40 mg total) by mouth once daily., Disp: 30 tablet, Rfl: 11    paroxetine (PAXIL) 20 MG tablet, Take 1 tablet (20 mg total) by mouth every morning., Disp: 90 tablet, Rfl: 3    thyroid, pork, (ARMOUR THYROID) 90 mg Tab, Take 1 tablet by mouth before breakfast., Disp: , Rfl:     valsartan-hydrochlorothiazide (DIOVAN-HCT) 160-12.5 mg per tablet, Take 1 tablet by mouth once daily., Disp: 90 tablet, Rfl: 2      Past medical, surgical, family and social histories have been reviewed today.      Objective:     Vitals:    03/01/23 0955 03/01/23 1015   BP: (!) 142/67 130/72   Pulse: 62 (!) 56   Temp: 96.7 °F (35.9 °C)    SpO2: 96%    Weight: 84.1 kg (185 lb 6.5 oz)    Height: 5' 3" (1.6 m)        Physical Exam  Vitals reviewed. "   Constitutional:       General: She is not in acute distress.  HENT:      Head: Normocephalic and atraumatic.   Eyes:      Pupils: Pupils are equal, round, and reactive to light.   Cardiovascular:      Rate and Rhythm: Regular rhythm. Bradycardia present.      Pulses: Normal pulses.      Heart sounds: Normal heart sounds.   Pulmonary:      Effort: Pulmonary effort is normal.      Breath sounds: Normal breath sounds.   Musculoskeletal:         General: Normal range of motion.      Cervical back: Normal range of motion and neck supple. No rigidity.      Right lower leg: No edema.      Left lower leg: No edema.   Skin:     Capillary Refill: Capillary refill takes less than 2 seconds.   Neurological:      Mental Status: She is alert and oriented to person, place, and time.      Motor: No weakness.      Gait: Gait normal.   Psychiatric:         Mood and Affect: Mood normal.         Behavior: Behavior normal.         Thought Content: Thought content normal.         Judgment: Judgment normal.         Diagnosis/Assessment:     1. Essential hypertension --- stable on current med, followed by Cardiology.    2. Adjustment disorder with mixed anxiety and depressed mood --- stable, on Paxil daily.    3. Encounter for screening mammogram for malignant neoplasm of breast  - Mammo Digital Screening Bilat w/ [facility]; Future       Plan:     HCM addressed --- mammo ordered.  See Cardiology (Dr. Abbasi) as scheduled for f/u on 4/23/23.    Follow-Up:     RTC as directed and/or prn.        FAHAD Blackburn  [facility]       30 minutes of total time spent on the encounter, which includes face to face time and non-face to face time preparing to see the patient.  This included obtaining and/or reviewing separately obtained history, and documenting clinical information in the electronic or other health record.   Also includes independent interpretation of results (not separately reported) and communicating  results to the patient/family/caregiver, with care coordination (not separately reported).

## 2023-03-01 ENCOUNTER — OFFICE VISIT (OUTPATIENT)
Dept: FAMILY MEDICINE | Facility: CLINIC | Age: 68
End: 2023-03-01
Payer: MEDICARE

## 2023-03-01 VITALS
TEMPERATURE: 97 F | HEART RATE: 56 BPM | BODY MASS INDEX: 32.86 KG/M2 | DIASTOLIC BLOOD PRESSURE: 72 MMHG | HEIGHT: 63 IN | OXYGEN SATURATION: 96 % | SYSTOLIC BLOOD PRESSURE: 130 MMHG | WEIGHT: 185.44 LBS

## 2023-03-01 DIAGNOSIS — Z12.31 ENCOUNTER FOR SCREENING MAMMOGRAM FOR MALIGNANT NEOPLASM OF BREAST: ICD-10-CM

## 2023-03-01 DIAGNOSIS — F43.23 ADJUSTMENT DISORDER WITH MIXED ANXIETY AND DEPRESSED MOOD: ICD-10-CM

## 2023-03-01 DIAGNOSIS — I10 ESSENTIAL HYPERTENSION: Primary | ICD-10-CM

## 2023-03-01 PROCEDURE — 3078F DIAST BP <80 MM HG: CPT | Mod: CPTII,S$GLB,, | Performed by: REGISTERED NURSE

## 2023-03-01 PROCEDURE — 99999 PR PBB SHADOW E&M-EST. PATIENT-LVL IV: ICD-10-PCS | Mod: PBBFAC,,, | Performed by: REGISTERED NURSE

## 2023-03-01 PROCEDURE — 3078F PR MOST RECENT DIASTOLIC BLOOD PRESSURE < 80 MM HG: ICD-10-PCS | Mod: CPTII,S$GLB,, | Performed by: REGISTERED NURSE

## 2023-03-01 PROCEDURE — 3008F BODY MASS INDEX DOCD: CPT | Mod: CPTII,S$GLB,, | Performed by: REGISTERED NURSE

## 2023-03-01 PROCEDURE — 3075F SYST BP GE 130 - 139MM HG: CPT | Mod: CPTII,S$GLB,, | Performed by: REGISTERED NURSE

## 2023-03-01 PROCEDURE — 1126F PR PAIN SEVERITY QUANTIFIED, NO PAIN PRESENT: ICD-10-PCS | Mod: CPTII,S$GLB,, | Performed by: REGISTERED NURSE

## 2023-03-01 PROCEDURE — 99214 PR OFFICE/OUTPT VISIT, EST, LEVL IV, 30-39 MIN: ICD-10-PCS | Mod: S$GLB,,, | Performed by: REGISTERED NURSE

## 2023-03-01 PROCEDURE — 1159F MED LIST DOCD IN RCRD: CPT | Mod: CPTII,S$GLB,, | Performed by: REGISTERED NURSE

## 2023-03-01 PROCEDURE — 99214 OFFICE O/P EST MOD 30 MIN: CPT | Mod: S$GLB,,, | Performed by: REGISTERED NURSE

## 2023-03-01 PROCEDURE — 3075F PR MOST RECENT SYSTOLIC BLOOD PRESS GE 130-139MM HG: ICD-10-PCS | Mod: CPTII,S$GLB,, | Performed by: REGISTERED NURSE

## 2023-03-01 PROCEDURE — 1159F PR MEDICATION LIST DOCUMENTED IN MEDICAL RECORD: ICD-10-PCS | Mod: CPTII,S$GLB,, | Performed by: REGISTERED NURSE

## 2023-03-01 PROCEDURE — 3008F PR BODY MASS INDEX (BMI) DOCUMENTED: ICD-10-PCS | Mod: CPTII,S$GLB,, | Performed by: REGISTERED NURSE

## 2023-03-01 PROCEDURE — 1126F AMNT PAIN NOTED NONE PRSNT: CPT | Mod: CPTII,S$GLB,, | Performed by: REGISTERED NURSE

## 2023-03-01 PROCEDURE — 99999 PR PBB SHADOW E&M-EST. PATIENT-LVL IV: CPT | Mod: PBBFAC,,, | Performed by: REGISTERED NURSE

## 2023-04-03 ENCOUNTER — HOSPITAL ENCOUNTER (OUTPATIENT)
Dept: RADIOLOGY | Facility: HOSPITAL | Age: 68
Discharge: HOME | End: 2023-04-03
Attending: REGISTERED NURSE
Payer: MEDICARE

## 2023-04-03 DIAGNOSIS — Z12.31 ENCOUNTER FOR SCREENING MAMMOGRAM FOR MALIGNANT NEOPLASM OF BREAST: ICD-10-CM

## 2023-04-03 PROCEDURE — 77067 MAMMO DIGITAL SCREENING BILAT WITH TOMO: ICD-10-PCS | Mod: 26,,, | Performed by: RADIOLOGY

## 2023-04-03 PROCEDURE — 77067 SCR MAMMO BI INCL CAD: CPT | Mod: 26,,, | Performed by: RADIOLOGY

## 2023-04-03 PROCEDURE — 77067 SCR MAMMO BI INCL CAD: CPT | Mod: TC

## 2023-04-03 PROCEDURE — 77063 BREAST TOMOSYNTHESIS BI: CPT | Mod: 26,,, | Performed by: RADIOLOGY

## 2023-04-03 PROCEDURE — 77063 MAMMO DIGITAL SCREENING BILAT WITH TOMO: ICD-10-PCS | Mod: 26,,, | Performed by: RADIOLOGY

## 2023-05-19 ENCOUNTER — HOSPITAL ENCOUNTER (OUTPATIENT)
Dept: RADIOLOGY | Facility: HOSPITAL | Age: 68
Discharge: HOME | End: 2023-05-19
Attending: UROLOGY
Payer: MEDICARE

## 2023-05-19 DIAGNOSIS — N28.1 RENAL CYST: ICD-10-CM

## 2023-05-19 PROCEDURE — 76770 US RETROPERITONEAL COMPLETE: ICD-10-PCS | Mod: 26,,, | Performed by: RADIOLOGY

## 2023-05-19 PROCEDURE — 76770 US EXAM ABDO BACK WALL COMP: CPT | Mod: 26,,, | Performed by: RADIOLOGY

## 2023-05-19 PROCEDURE — 76770 US EXAM ABDO BACK WALL COMP: CPT | Mod: TC

## 2023-05-26 ENCOUNTER — OFFICE VISIT (OUTPATIENT)
Dept: UROLOGY | Facility: CLINIC | Age: 68
End: 2023-05-26
Payer: MEDICARE

## 2023-05-26 VITALS
BODY MASS INDEX: 32.78 KG/M2 | WEIGHT: 185 LBS | HEIGHT: 63 IN | DIASTOLIC BLOOD PRESSURE: 68 MMHG | HEART RATE: 55 BPM | SYSTOLIC BLOOD PRESSURE: 127 MMHG

## 2023-05-26 DIAGNOSIS — N28.1 RENAL CYST: Primary | ICD-10-CM

## 2023-05-26 PROCEDURE — 1101F PT FALLS ASSESS-DOCD LE1/YR: CPT | Mod: CPTII,S$GLB,, | Performed by: UROLOGY

## 2023-05-26 PROCEDURE — 1160F PR REVIEW ALL MEDS BY PRESCRIBER/CLIN PHARMACIST DOCUMENTED: ICD-10-PCS | Mod: CPTII,S$GLB,, | Performed by: UROLOGY

## 2023-05-26 PROCEDURE — 3288F PR FALLS RISK ASSESSMENT DOCUMENTED: ICD-10-PCS | Mod: CPTII,S$GLB,, | Performed by: UROLOGY

## 2023-05-26 PROCEDURE — 3074F SYST BP LT 130 MM HG: CPT | Mod: CPTII,S$GLB,, | Performed by: UROLOGY

## 2023-05-26 PROCEDURE — 3008F BODY MASS INDEX DOCD: CPT | Mod: CPTII,S$GLB,, | Performed by: UROLOGY

## 2023-05-26 PROCEDURE — 1126F PR PAIN SEVERITY QUANTIFIED, NO PAIN PRESENT: ICD-10-PCS | Mod: CPTII,S$GLB,, | Performed by: UROLOGY

## 2023-05-26 PROCEDURE — 3008F PR BODY MASS INDEX (BMI) DOCUMENTED: ICD-10-PCS | Mod: CPTII,S$GLB,, | Performed by: UROLOGY

## 2023-05-26 PROCEDURE — 3078F PR MOST RECENT DIASTOLIC BLOOD PRESSURE < 80 MM HG: ICD-10-PCS | Mod: CPTII,S$GLB,, | Performed by: UROLOGY

## 2023-05-26 PROCEDURE — 99999 PR PBB SHADOW E&M-EST. PATIENT-LVL III: CPT | Mod: PBBFAC,,, | Performed by: UROLOGY

## 2023-05-26 PROCEDURE — 1101F PR PT FALLS ASSESS DOC 0-1 FALLS W/OUT INJ PAST YR: ICD-10-PCS | Mod: CPTII,S$GLB,, | Performed by: UROLOGY

## 2023-05-26 PROCEDURE — 1160F RVW MEDS BY RX/DR IN RCRD: CPT | Mod: CPTII,S$GLB,, | Performed by: UROLOGY

## 2023-05-26 PROCEDURE — 3074F PR MOST RECENT SYSTOLIC BLOOD PRESSURE < 130 MM HG: ICD-10-PCS | Mod: CPTII,S$GLB,, | Performed by: UROLOGY

## 2023-05-26 PROCEDURE — 1126F AMNT PAIN NOTED NONE PRSNT: CPT | Mod: CPTII,S$GLB,, | Performed by: UROLOGY

## 2023-05-26 PROCEDURE — 3078F DIAST BP <80 MM HG: CPT | Mod: CPTII,S$GLB,, | Performed by: UROLOGY

## 2023-05-26 PROCEDURE — 1159F PR MEDICATION LIST DOCUMENTED IN MEDICAL RECORD: ICD-10-PCS | Mod: CPTII,S$GLB,, | Performed by: UROLOGY

## 2023-05-26 PROCEDURE — 1159F MED LIST DOCD IN RCRD: CPT | Mod: CPTII,S$GLB,, | Performed by: UROLOGY

## 2023-05-26 PROCEDURE — 99213 OFFICE O/P EST LOW 20 MIN: CPT | Mod: S$GLB,,, | Performed by: UROLOGY

## 2023-05-26 PROCEDURE — 99213 PR OFFICE/OUTPT VISIT, EST, LEVL III, 20-29 MIN: ICD-10-PCS | Mod: S$GLB,,, | Performed by: UROLOGY

## 2023-05-26 PROCEDURE — 3288F FALL RISK ASSESSMENT DOCD: CPT | Mod: CPTII,S$GLB,, | Performed by: UROLOGY

## 2023-05-26 PROCEDURE — 99999 PR PBB SHADOW E&M-EST. PATIENT-LVL III: ICD-10-PCS | Mod: PBBFAC,,, | Performed by: UROLOGY

## 2023-05-26 NOTE — PROGRESS NOTES
Chief Complaint: Renal cysts    HPI:   05/26/2023 - patient returns today for follow-up, no new issues in the interim, renal ultrasound shows stable cysts, having some stress incontinence and wears three thin panty liners per day, previously saw pelvic floor physical therapy and found it helpful, she would like to go back and see them, denies any gross hematuria or UTIs    05/25/2022 - patient presents today for follow-up, no issues in the interim, renal ultrasound several days ago showed stable renal cysts, no voiding issues, no gross hematuria    5/21/20: Reviewed history in detail. Feeling fine.  DORY about the same.   Indep assessment of imaging agree with report:  Renal cyst same right kidney.  No time for PFMT her  is having severe complications of multiple maladies.  5/20/19: Reviewed history in detail. Feeling fine, no problems.  Cyst same as before smaller if anything.  Some DORY not needing a pad.  5/17/18: No problems in the interval. Bilateral cysts same.  5/11/17: 62 yo woman followed for 4 years for renal cysts.  Had a pyelonephritis on the way.  Has a 2.5 cm right Adal II cyst that looks stable over many years.    Allergies:  Climara [estradiol]    Medications: has a current medication list which includes the following prescription(s): aspirin, butalbital-acetaminophen-caffeine -40 mg, finasteride, latanoprost, metoprolol tartrate, multivitamin with minerals, pantoprazole, paroxetine, thyroid (pork), valsartan-hydrochlorothiazide, and cyclosporine.    Review of Systems:  General: No fever, chills  Skin: No rashes  Chest:  Denies cough and sputum production  Heart: Denies chest pain  Resp: Denies dyspnea  Abdomen: Denies diarrhea, abdominal pain, hematemesis, or blood in stool.  Musculoskeletal: No joint stiffness or swelling. Denies back pain.  : see HPI  Neuro: no dizziness or weakness      PMH:   has a past medical history of Chronic constipation, Depression, Dry eyes, Endometriosis of  broad ligament, Hypothyroidism, Macular degeneration, Migraine headache, Osteoarthritis of hand, PAF (paroxysmal atrial fibrillation), Primary open angle glaucoma (POAG) of both eyes, mild stage (04/24/2018), and Seasonal allergic rhinitis.    PSH:   has a past surgical history that includes Lipoma resection (2010); Total abdominal hysterectomy w/ bilateral salpingoophorectomy (03/2008); Cervical conization w/ laser; Dilation and curettage of uterus; LASIK (2007); Argon Trabeculoplasty - OU - Both Eyes (Bilateral, 06/11/2020); Colonoscopy (N/A, 08/18/2022); Adenoidectomy (1962); Eye surgery (July 2020); Tonsillectomy (1962); Tubal ligation (1980); and Hysterectomy (03/2008).    FamHx: family history includes Arthritis in her father; Dementia in her mother; Depression in her mother; Diabetes in her father; Glaucoma in her maternal uncle; Heart disease in her father and mother; Heart failure in her father; Hypertension in her father and mother; No Known Problems in her brother; Vision loss in her mother.    SocHx:  reports that she quit smoking about 40 years ago. Her smoking use included cigarettes. She has a 15.00 pack-year smoking history. She has never used smokeless tobacco. She reports current alcohol use. She reports that she does not use drugs.     Physical Exam:  Vitals:   Vitals:    05/26/23 1401   BP: 127/68   Pulse: (!) 55     General: awake, alert, cooperative  Head: NC/AT  Ears: external ears normal  Eyes: sclera normal  Lungs: normal inspiration, NAD  Heart: well-perfused  Skin: The skin is warm and dry.  Ext: No c/c/e.  Neuro: grossly intact, AOx3      Labs/Studies:   US RETROPERITONEAL COMPLETE 05/26/2023     CLINICAL HISTORY:  Cyst of kidney, acquired     TECHNIQUE:  Routine imaging of the kidneys and bladder performed.     COMPARISON:  05/19/2022     FINDINGS:  Right kidney: The right kidney measures 9.9 cm. Echotexture normal.  1 cm lower pole complex cyst, previous 1.4 cm.  No nephrolithiasis.  No  hydronephrosis.     Left kidney: The left kidney measures 10.1 cm. Echotexture normal.  9 mm cyst with peripheral calcification, not significantly changed.  No nephrolithiasis.  No hydronephrosis.     The bladder is unremarkable for distension.     Impression:     No detrimental change    Impression/Plan:   Minimally complex renal cyst - f/u 1 yr with GLORIA    DORY - refer to PFPT    Panfilo Light MD

## 2023-06-14 RX ORDER — CYCLOSPORINE 0.5 MG/ML
1 EMULSION OPHTHALMIC 2 TIMES DAILY
Qty: 60 EACH | Refills: 12 | Status: SHIPPED | OUTPATIENT
Start: 2023-06-14 | End: 2024-06-13

## 2023-07-07 ENCOUNTER — OFFICE VISIT (OUTPATIENT)
Dept: OPHTHALMOLOGY | Facility: CLINIC | Age: 68
End: 2023-07-07
Payer: MEDICARE

## 2023-07-07 DIAGNOSIS — Z98.890 HISTORY OF REFRACTIVE SURGERY: ICD-10-CM

## 2023-07-07 DIAGNOSIS — M35.01 KERATITIS SICCA, BILATERAL: ICD-10-CM

## 2023-07-07 DIAGNOSIS — H40.1131 PRIMARY OPEN ANGLE GLAUCOMA (POAG) OF BOTH EYES, MILD STAGE: Primary | ICD-10-CM

## 2023-07-07 DIAGNOSIS — H18.832 RECURRENT CORNEAL EROSION, LEFT: ICD-10-CM

## 2023-07-07 PROCEDURE — 92083 HUMPHREY VISUAL FIELD - OU - BOTH EYES: ICD-10-PCS | Mod: S$GLB,,, | Performed by: OPHTHALMOLOGY

## 2023-07-07 PROCEDURE — 99999 PR PBB SHADOW E&M-EST. PATIENT-LVL I: CPT | Mod: PBBFAC,,, | Performed by: OPHTHALMOLOGY

## 2023-07-07 PROCEDURE — 99214 PR OFFICE/OUTPT VISIT, EST, LEVL IV, 30-39 MIN: ICD-10-PCS | Mod: S$GLB,,, | Performed by: OPHTHALMOLOGY

## 2023-07-07 PROCEDURE — 92133 CPTRZD OPH DX IMG PST SGM ON: CPT | Mod: S$GLB,,, | Performed by: OPHTHALMOLOGY

## 2023-07-07 PROCEDURE — 1160F RVW MEDS BY RX/DR IN RCRD: CPT | Mod: CPTII,S$GLB,, | Performed by: OPHTHALMOLOGY

## 2023-07-07 PROCEDURE — 1159F PR MEDICATION LIST DOCUMENTED IN MEDICAL RECORD: ICD-10-PCS | Mod: CPTII,S$GLB,, | Performed by: OPHTHALMOLOGY

## 2023-07-07 PROCEDURE — 99214 OFFICE O/P EST MOD 30 MIN: CPT | Mod: S$GLB,,, | Performed by: OPHTHALMOLOGY

## 2023-07-07 PROCEDURE — 92133 POSTERIOR SEGMENT OCT OPTIC NERVE(OCULAR COHERENCE TOMOGRAPHY) - OU - BOTH EYES: ICD-10-PCS | Mod: S$GLB,,, | Performed by: OPHTHALMOLOGY

## 2023-07-07 PROCEDURE — 99999 PR PBB SHADOW E&M-EST. PATIENT-LVL I: ICD-10-PCS | Mod: PBBFAC,,, | Performed by: OPHTHALMOLOGY

## 2023-07-07 PROCEDURE — 1160F PR REVIEW ALL MEDS BY PRESCRIBER/CLIN PHARMACIST DOCUMENTED: ICD-10-PCS | Mod: CPTII,S$GLB,, | Performed by: OPHTHALMOLOGY

## 2023-07-07 PROCEDURE — 92083 EXTENDED VISUAL FIELD XM: CPT | Mod: S$GLB,,, | Performed by: OPHTHALMOLOGY

## 2023-07-07 PROCEDURE — 1159F MED LIST DOCD IN RCRD: CPT | Mod: CPTII,S$GLB,, | Performed by: OPHTHALMOLOGY

## 2023-07-07 RX ORDER — BRINZOLAMIDE/BRIMONIDINE TARTRATE 10; 2 MG/ML; MG/ML
1 SUSPENSION/ DROPS OPHTHALMIC 3 TIMES DAILY
Qty: 8 ML | Refills: 6 | Status: SHIPPED | OUTPATIENT
Start: 2023-07-07 | End: 2023-09-20

## 2023-07-07 NOTE — PROGRESS NOTES
HPI     Glaucoma            Comments: Pt reports for 6m HVF GOCT dil and check K. Denies any pain or   irritation. Va stable. 100% compliant with gtts. Pt thinks dryness is much   better since last visit           Comments    1. Anatomic variant mac degen  2. Cobblestone od  3. Dry eyes  Punctal plugs ou  4. H/O Lasik  5. H/O Iritis os  HVF 3/18/19  6.poag  7.rce od  8. SLT OU 6-11-20    Restasis BID OU  Latanoprost QHS OU  Systane Hydration BID OU   Refresh Yan tears BID            Last edited by Leonel Bosch MD on 7/7/2023  9:49 AM.            Assessment /Plan     For exam results, see Encounter Report.      ICD-10-CM ICD-9-CM    1. Primary open angle glaucoma (POAG) of both eyes, mild stage  H40.1131 365.11 Posterior Segment OCT Optic Nerve- Both eyes     365.71 Sung Visual Field - OU - Extended - Both Eyes would like IOP lower   Add simbrinza trial       2. Keratitis sicca, bilateral  M35.01 370.8 Significantly improved today on exam - continue same meds       3. Recurrent corneal erosion, left  H18.832 371.42 Stable- follow       4. History of refractive surgery  Z98.890 V45.69 S/P lasik           RETURN TO CLINIC 2 month IOP       Simbrinza BID OU   Restasis BID OU  Latanoprost QHS OU  Systane Hydration BID OU   Refresh Yan tears BID

## 2023-08-11 ENCOUNTER — OFFICE VISIT (OUTPATIENT)
Dept: FAMILY MEDICINE | Facility: CLINIC | Age: 68
End: 2023-08-11
Payer: MEDICARE

## 2023-08-11 VITALS
HEIGHT: 63 IN | HEART RATE: 50 BPM | TEMPERATURE: 97 F | OXYGEN SATURATION: 97 % | WEIGHT: 160.94 LBS | BODY MASS INDEX: 28.52 KG/M2 | SYSTOLIC BLOOD PRESSURE: 126 MMHG | DIASTOLIC BLOOD PRESSURE: 70 MMHG

## 2023-08-11 DIAGNOSIS — R53.83 FATIGUE, UNSPECIFIED TYPE: ICD-10-CM

## 2023-08-11 DIAGNOSIS — K12.1 ORAL ULCER: ICD-10-CM

## 2023-08-11 DIAGNOSIS — I10 ESSENTIAL HYPERTENSION: ICD-10-CM

## 2023-08-11 DIAGNOSIS — L04.9 ACUTE LYMPHADENITIS: Primary | ICD-10-CM

## 2023-08-11 DIAGNOSIS — E03.9 ACQUIRED HYPOTHYROIDISM: Chronic | ICD-10-CM

## 2023-08-11 PROCEDURE — 3288F FALL RISK ASSESSMENT DOCD: CPT | Mod: CPTII,S$GLB,, | Performed by: FAMILY MEDICINE

## 2023-08-11 PROCEDURE — 1125F AMNT PAIN NOTED PAIN PRSNT: CPT | Mod: CPTII,S$GLB,, | Performed by: FAMILY MEDICINE

## 2023-08-11 PROCEDURE — 1101F PT FALLS ASSESS-DOCD LE1/YR: CPT | Mod: CPTII,S$GLB,, | Performed by: FAMILY MEDICINE

## 2023-08-11 PROCEDURE — 3078F DIAST BP <80 MM HG: CPT | Mod: CPTII,S$GLB,, | Performed by: FAMILY MEDICINE

## 2023-08-11 PROCEDURE — 3008F PR BODY MASS INDEX (BMI) DOCUMENTED: ICD-10-PCS | Mod: CPTII,S$GLB,, | Performed by: FAMILY MEDICINE

## 2023-08-11 PROCEDURE — 1160F PR REVIEW ALL MEDS BY PRESCRIBER/CLIN PHARMACIST DOCUMENTED: ICD-10-PCS | Mod: CPTII,S$GLB,, | Performed by: FAMILY MEDICINE

## 2023-08-11 PROCEDURE — 99999 PR PBB SHADOW E&M-EST. PATIENT-LVL IV: ICD-10-PCS | Mod: PBBFAC,,, | Performed by: FAMILY MEDICINE

## 2023-08-11 PROCEDURE — 3074F SYST BP LT 130 MM HG: CPT | Mod: CPTII,S$GLB,, | Performed by: FAMILY MEDICINE

## 2023-08-11 PROCEDURE — 1125F PR PAIN SEVERITY QUANTIFIED, PAIN PRESENT: ICD-10-PCS | Mod: CPTII,S$GLB,, | Performed by: FAMILY MEDICINE

## 2023-08-11 PROCEDURE — 3078F PR MOST RECENT DIASTOLIC BLOOD PRESSURE < 80 MM HG: ICD-10-PCS | Mod: CPTII,S$GLB,, | Performed by: FAMILY MEDICINE

## 2023-08-11 PROCEDURE — 1159F MED LIST DOCD IN RCRD: CPT | Mod: CPTII,S$GLB,, | Performed by: FAMILY MEDICINE

## 2023-08-11 PROCEDURE — 1160F RVW MEDS BY RX/DR IN RCRD: CPT | Mod: CPTII,S$GLB,, | Performed by: FAMILY MEDICINE

## 2023-08-11 PROCEDURE — 1101F PR PT FALLS ASSESS DOC 0-1 FALLS W/OUT INJ PAST YR: ICD-10-PCS | Mod: CPTII,S$GLB,, | Performed by: FAMILY MEDICINE

## 2023-08-11 PROCEDURE — 99214 PR OFFICE/OUTPT VISIT, EST, LEVL IV, 30-39 MIN: ICD-10-PCS | Mod: S$GLB,,, | Performed by: FAMILY MEDICINE

## 2023-08-11 PROCEDURE — 3288F PR FALLS RISK ASSESSMENT DOCUMENTED: ICD-10-PCS | Mod: CPTII,S$GLB,, | Performed by: FAMILY MEDICINE

## 2023-08-11 PROCEDURE — 1159F PR MEDICATION LIST DOCUMENTED IN MEDICAL RECORD: ICD-10-PCS | Mod: CPTII,S$GLB,, | Performed by: FAMILY MEDICINE

## 2023-08-11 PROCEDURE — 99999 PR PBB SHADOW E&M-EST. PATIENT-LVL IV: CPT | Mod: PBBFAC,,, | Performed by: FAMILY MEDICINE

## 2023-08-11 PROCEDURE — 3008F BODY MASS INDEX DOCD: CPT | Mod: CPTII,S$GLB,, | Performed by: FAMILY MEDICINE

## 2023-08-11 PROCEDURE — 3074F PR MOST RECENT SYSTOLIC BLOOD PRESSURE < 130 MM HG: ICD-10-PCS | Mod: CPTII,S$GLB,, | Performed by: FAMILY MEDICINE

## 2023-08-11 PROCEDURE — 99214 OFFICE O/P EST MOD 30 MIN: CPT | Mod: S$GLB,,, | Performed by: FAMILY MEDICINE

## 2023-08-11 RX ORDER — CEPHALEXIN 500 MG/1
500 CAPSULE ORAL 2 TIMES DAILY
Qty: 20 CAPSULE | Refills: 0 | Status: SHIPPED | OUTPATIENT
Start: 2023-08-11 | End: 2023-08-21

## 2023-08-11 NOTE — PROGRESS NOTES
CHIEF COMPLAINT: This is a 67-year-old female complains of ear pain and lightheadedness.     SUBJECTIVE:  Patient complains of one-week history of soreness in ear, swollen lymph glands and gingival pain.  Symptoms started after flossing her teeth vigorously.  She describes a soreness in her ear as a pressure-like sensation.  She denies pain with chewing, sore throat, nasal congestion or runny nose.  Patient has not been recently swimming.    Patient complains of weeks to months of fatigue with episodes of energy draining out of her body from her head to her legs.  She describes an occasional presyncopal feeling and occasional slight dizziness.  The patient's cardiologist decreased metoprolol 25 mg twice daily to 12.5 mg twice daily due to low heart rate.  Patient has a history of palpitations and PAF.  In addition, she takes valsartan -12.5 mg daily for hypertension.  She reports that she has to take medication in the evening because if she takes it in the morning she feels weakness and fatigue.  Her blood pressure today is 126/70.  Her pulse varies from 49-51.      Patient takes pork thyroid for hypothyroidism per OBGYN and finasteride for hair loss.  The patient has migraine headaches and takes Fioricet as needed.  She discontinued Paxil for depression which remains in remission.     ROS:  GENERAL: Patient denies fever, chills, night sweats.  Patient denies weight loss. Patient denies anorexia.  Positive for fatigue, weakness and swollen gland.    SKIN: Patient denies rash or hair loss.  HEENT: Patient denies sore throat, hearing loss, nasal congestion, or runny nose. Patient denies visual disturbance, eye irritation or discharge.  Positive for ear pain and gum pain.  LUNGS: Patient denies cough, wheeze or hemoptysis.  CARDIOVASCULAR: Patient denies chest pain, shortness of breath, palpitations, syncope or lower extremity edema.  GI: Patient denies abdominal pain, nausea, vomiting, diarrhea, constipation,  blood in stool or melena.  GENITOURINARY:  Patient denies dysuria, frequency, hematuria, nocturia, urgency or incontinence.  MUSCULOSKELETAL: Patient denies joint swelling, redness or warmth.  NEUROLOGIC: Patient denies headache, vertigo, paresthesias, weakness in limb, dysarthria, dysphagia or abnormality of gait.  Positive for dizziness.  PSYCHIATRIC: Patient denies depression, anxiety or memory loss.     OBJECTIVE:   GENERAL: Well-developed well-nourished obese white female alert and oriented x3, in no acute distress.  Memory, judgment and cognition without deficit.   SKIN: Clear without rash.  Normal color and tone.  HEENT: Eyes: Clear conjunctivae. No scleral icterus.  Ears:  Clear canals.  Clear TMs.  Nose:  Without congestion.  Pharynx:  Without injection or exudates.  Inflamed right upper gingiva with ulceration adjacent to be 2nd molar.    NECK: Supple, normal range of motion.  Enlarged, slightly tender right anterior cervical lymph node.  No JVD.  Carotids 2+ and equal.  No bruits.  LUNGS: Clear to auscultation.  Normal respiratory effort.  CARDIOVASCULAR:  Slow regular rhythm, normal S1, S2 without murmur, gallop or rub.  ABDOMEN:  Soft, nontender without mass organomegaly.  No rebound or guarding.  EXTREMITIES: Without cyanosis, clubbing or edema.  Distal pulses 2+ and equal.  Normal range of motion in all extremities.  No joint effusion, erythema or warmth.  NEUROLOGIC:  Motor strength equal bilaterally.  Sensation normal to touch.  Gait without abnormality.  No tremor.      ASSESSMENT:  1. Acute lymphadenitis    2. Oral ulcer    3. Fatigue, unspecified type    4. Acquired hypothyroidism    5. Essential hypertension      PLAN:  1. Keflex 500 mg twice daily for 10 days.    2. Check lab to include CBC, CMP, TSH, lipid panel and sed rate.  3. Keep well hydrated.    4. Follow-up with Cardiology regarding bradycardia.    5. Return to clinic if no improvement or worsening symptoms.    30 minutes of total time  spent on the encounter, which includes face to face time and non-face to face time preparing to see the patient (eg, review of tests), Obtaining and/or reviewing separately obtained history, Documenting clinical information in the electronic or other health record, Independently interpreting results (not separately reported) and communicating results to the patient/family/caregiver, or Care coordination (not separately reported).     This note is generated with speech recognition software and is subject to transcription error and sound alike phrases that may be missed by proofreading.

## 2023-08-13 ENCOUNTER — PATIENT MESSAGE (OUTPATIENT)
Dept: FAMILY MEDICINE | Facility: CLINIC | Age: 68
End: 2023-08-13
Payer: MEDICARE

## 2023-08-14 ENCOUNTER — PATIENT MESSAGE (OUTPATIENT)
Dept: OPHTHALMOLOGY | Facility: CLINIC | Age: 68
End: 2023-08-14
Payer: MEDICARE

## 2023-08-14 DIAGNOSIS — H40.1131 PRIMARY OPEN ANGLE GLAUCOMA (POAG) OF BOTH EYES, MILD STAGE: Primary | ICD-10-CM

## 2023-08-15 ENCOUNTER — LAB VISIT (OUTPATIENT)
Dept: LAB | Facility: HOSPITAL | Age: 68
End: 2023-08-15
Attending: FAMILY MEDICINE
Payer: MEDICARE

## 2023-08-15 DIAGNOSIS — E03.9 ACQUIRED HYPOTHYROIDISM: Chronic | ICD-10-CM

## 2023-08-15 DIAGNOSIS — R53.83 FATIGUE, UNSPECIFIED TYPE: ICD-10-CM

## 2023-08-15 DIAGNOSIS — I10 ESSENTIAL HYPERTENSION: ICD-10-CM

## 2023-08-15 LAB
ALBUMIN SERPL BCP-MCNC: 4 G/DL (ref 3.5–5.2)
ALP SERPL-CCNC: 62 U/L (ref 55–135)
ALT SERPL W/O P-5'-P-CCNC: 17 U/L (ref 10–44)
ANION GAP SERPL CALC-SCNC: 12 MMOL/L (ref 8–16)
AST SERPL-CCNC: 20 U/L (ref 10–40)
BASOPHILS # BLD AUTO: 0.05 K/UL (ref 0–0.2)
BASOPHILS NFR BLD: 1 % (ref 0–1.9)
BILIRUB SERPL-MCNC: 0.5 MG/DL (ref 0.1–1)
BUN SERPL-MCNC: 16 MG/DL (ref 8–23)
CALCIUM SERPL-MCNC: 9.4 MG/DL (ref 8.7–10.5)
CHLORIDE SERPL-SCNC: 103 MMOL/L (ref 95–110)
CHOLEST SERPL-MCNC: 154 MG/DL (ref 120–199)
CHOLEST/HDLC SERPL: 3.1 {RATIO} (ref 2–5)
CO2 SERPL-SCNC: 24 MMOL/L (ref 23–29)
CREAT SERPL-MCNC: 0.8 MG/DL (ref 0.5–1.4)
DIFFERENTIAL METHOD: ABNORMAL
EOSINOPHIL # BLD AUTO: 0.1 K/UL (ref 0–0.5)
EOSINOPHIL NFR BLD: 2.3 % (ref 0–8)
ERYTHROCYTE [DISTWIDTH] IN BLOOD BY AUTOMATED COUNT: 11.9 % (ref 11.5–14.5)
ERYTHROCYTE [SEDIMENTATION RATE] IN BLOOD BY PHOTOMETRIC METHOD: 19 MM/HR (ref 0–36)
EST. GFR  (NO RACE VARIABLE): >60 ML/MIN/1.73 M^2
GLUCOSE SERPL-MCNC: 85 MG/DL (ref 70–110)
HCT VFR BLD AUTO: 41.6 % (ref 37–48.5)
HDLC SERPL-MCNC: 49 MG/DL (ref 40–75)
HDLC SERPL: 31.8 % (ref 20–50)
HGB BLD-MCNC: 13.8 G/DL (ref 12–16)
IMM GRANULOCYTES # BLD AUTO: 0.01 K/UL (ref 0–0.04)
IMM GRANULOCYTES NFR BLD AUTO: 0.2 % (ref 0–0.5)
LDLC SERPL CALC-MCNC: 85.4 MG/DL (ref 63–159)
LYMPHOCYTES # BLD AUTO: 1.9 K/UL (ref 1–4.8)
LYMPHOCYTES NFR BLD: 36.9 % (ref 18–48)
MCH RBC QN AUTO: 31.4 PG (ref 27–31)
MCHC RBC AUTO-ENTMCNC: 33.2 G/DL (ref 32–36)
MCV RBC AUTO: 95 FL (ref 82–98)
MONOCYTES # BLD AUTO: 0.6 K/UL (ref 0.3–1)
MONOCYTES NFR BLD: 11 % (ref 4–15)
NEUTROPHILS # BLD AUTO: 2.5 K/UL (ref 1.8–7.7)
NEUTROPHILS NFR BLD: 48.6 % (ref 38–73)
NONHDLC SERPL-MCNC: 105 MG/DL
NRBC BLD-RTO: 0 /100 WBC
PLATELET # BLD AUTO: 253 K/UL (ref 150–450)
PMV BLD AUTO: 9.4 FL (ref 9.2–12.9)
POTASSIUM SERPL-SCNC: 4.1 MMOL/L (ref 3.5–5.1)
PROT SERPL-MCNC: 7.4 G/DL (ref 6–8.4)
RBC # BLD AUTO: 4.39 M/UL (ref 4–5.4)
SODIUM SERPL-SCNC: 139 MMOL/L (ref 136–145)
T4 FREE SERPL-MCNC: 0.8 NG/DL (ref 0.71–1.51)
TRIGL SERPL-MCNC: 98 MG/DL (ref 30–150)
TSH SERPL DL<=0.005 MIU/L-ACNC: 0.05 UIU/ML (ref 0.4–4)
WBC # BLD AUTO: 5.17 K/UL (ref 3.9–12.7)

## 2023-08-15 PROCEDURE — 84439 ASSAY OF FREE THYROXINE: CPT | Performed by: FAMILY MEDICINE

## 2023-08-15 PROCEDURE — 84443 ASSAY THYROID STIM HORMONE: CPT | Performed by: FAMILY MEDICINE

## 2023-08-15 PROCEDURE — 80061 LIPID PANEL: CPT | Performed by: FAMILY MEDICINE

## 2023-08-15 PROCEDURE — 85025 COMPLETE CBC W/AUTO DIFF WBC: CPT | Performed by: FAMILY MEDICINE

## 2023-08-15 PROCEDURE — 85652 RBC SED RATE AUTOMATED: CPT | Performed by: FAMILY MEDICINE

## 2023-08-15 PROCEDURE — 80053 COMPREHEN METABOLIC PANEL: CPT | Performed by: FAMILY MEDICINE

## 2023-08-15 PROCEDURE — 36415 COLL VENOUS BLD VENIPUNCTURE: CPT | Mod: PO | Performed by: FAMILY MEDICINE

## 2023-08-15 RX ORDER — DORZOLAMIDE HCL 20 MG/ML
1 SOLUTION/ DROPS OPHTHALMIC 2 TIMES DAILY
Qty: 10 ML | Refills: 1 | Status: SHIPPED | OUTPATIENT
Start: 2023-08-15 | End: 2024-08-14

## 2023-09-20 ENCOUNTER — OFFICE VISIT (OUTPATIENT)
Dept: OPHTHALMOLOGY | Facility: CLINIC | Age: 68
End: 2023-09-20
Payer: MEDICARE

## 2023-09-20 DIAGNOSIS — M35.01 KERATITIS SICCA, BILATERAL: ICD-10-CM

## 2023-09-20 DIAGNOSIS — H18.832 RECURRENT CORNEAL EROSION, LEFT: ICD-10-CM

## 2023-09-20 DIAGNOSIS — Z98.890 HISTORY OF REFRACTIVE SURGERY: ICD-10-CM

## 2023-09-20 DIAGNOSIS — H40.1131 PRIMARY OPEN ANGLE GLAUCOMA (POAG) OF BOTH EYES, MILD STAGE: Primary | ICD-10-CM

## 2023-09-20 PROCEDURE — 1160F RVW MEDS BY RX/DR IN RCRD: CPT | Mod: CPTII,S$GLB,, | Performed by: OPHTHALMOLOGY

## 2023-09-20 PROCEDURE — 1160F PR REVIEW ALL MEDS BY PRESCRIBER/CLIN PHARMACIST DOCUMENTED: ICD-10-PCS | Mod: CPTII,S$GLB,, | Performed by: OPHTHALMOLOGY

## 2023-09-20 PROCEDURE — 99213 PR OFFICE/OUTPT VISIT, EST, LEVL III, 20-29 MIN: ICD-10-PCS | Mod: S$GLB,,, | Performed by: OPHTHALMOLOGY

## 2023-09-20 PROCEDURE — 1159F MED LIST DOCD IN RCRD: CPT | Mod: CPTII,S$GLB,, | Performed by: OPHTHALMOLOGY

## 2023-09-20 PROCEDURE — 99999 PR PBB SHADOW E&M-EST. PATIENT-LVL III: ICD-10-PCS | Mod: PBBFAC,,, | Performed by: OPHTHALMOLOGY

## 2023-09-20 PROCEDURE — 1159F PR MEDICATION LIST DOCUMENTED IN MEDICAL RECORD: ICD-10-PCS | Mod: CPTII,S$GLB,, | Performed by: OPHTHALMOLOGY

## 2023-09-20 PROCEDURE — 99213 OFFICE O/P EST LOW 20 MIN: CPT | Mod: S$GLB,,, | Performed by: OPHTHALMOLOGY

## 2023-09-20 PROCEDURE — 99999 PR PBB SHADOW E&M-EST. PATIENT-LVL III: CPT | Mod: PBBFAC,,, | Performed by: OPHTHALMOLOGY

## 2023-09-20 PROCEDURE — 1126F PR PAIN SEVERITY QUANTIFIED, NO PAIN PRESENT: ICD-10-PCS | Mod: CPTII,S$GLB,, | Performed by: OPHTHALMOLOGY

## 2023-09-20 PROCEDURE — 1126F AMNT PAIN NOTED NONE PRSNT: CPT | Mod: CPTII,S$GLB,, | Performed by: OPHTHALMOLOGY

## 2023-09-20 RX ORDER — TIMOLOL MALEATE 5 MG/ML
1 SOLUTION/ DROPS OPHTHALMIC 2 TIMES DAILY
Qty: 15 ML | Refills: 3 | Status: SHIPPED | OUTPATIENT
Start: 2023-09-20 | End: 2024-09-19

## 2023-09-20 RX ORDER — LATANOPROST 50 UG/ML
1 SOLUTION/ DROPS OPHTHALMIC NIGHTLY
Qty: 2.5 ML | Refills: 11 | Status: SHIPPED | OUTPATIENT
Start: 2023-09-20

## 2023-09-20 NOTE — PROGRESS NOTES
HPI    Pt states she could not tolerate the Simbrinza so she was changed to   Dorzolamide. She states the Dorzolamide burns, she only use once daily.   She would like to get a glasses prescription to help with glare.    1. Anatomic variant mac degen  2. Cobblestone od  3. Dry eyes  Punctal plugs ou  4. H/O Lasik  5. H/O Iritis os  HVF 3/18/19  6.poag  7.rce od  8. SLT OU 6-11-20  K sensitivity test NO OU - 7/7/2023     Simbrinza BID OU   Restasis BID OU  Latanoprost QHS OU  Systane Hydration BID OU   Refresh Yan tears BID  Last edited by Brad Mendez MA on 9/20/2023  8:08 AM.            Assessment /Plan     For exam results, see Encounter Report.      ICD-10-CM ICD-9-CM    1. Primary open angle glaucoma (POAG) of both eyes, mild stage  H40.1131 365.11 latanoprost 0.005 % ophthalmic solution    Will add Timolol BID OU, pt unable to tolerate Simbrinza and Dorzolamide     365.71       2. Keratitis sicca, bilateral  M35.01 370.8       3. Recurrent corneal erosion, left  H18.832 371.42       4. History of refractive surgery  Z98.890 V45.69           Return to clinic 4-6 weeks for IOP        Timolol BID OU  Latanoprost QHS OU  Restasis PRN OU  Systane Hydration BID OU   Refresh Yan tears BID

## 2023-09-25 DIAGNOSIS — G43.909 MIGRAINE WITHOUT STATUS MIGRAINOSUS, NOT INTRACTABLE, UNSPECIFIED MIGRAINE TYPE: ICD-10-CM

## 2023-09-25 RX ORDER — BUTALBITAL, ACETAMINOPHEN AND CAFFEINE 50; 325; 40 MG/1; MG/1; MG/1
1 TABLET ORAL
Qty: 60 TABLET | Refills: 2 | Status: SHIPPED | OUTPATIENT
Start: 2023-09-25

## 2023-09-25 RX ORDER — BUTALBITAL, ACETAMINOPHEN AND CAFFEINE 50; 325; 40 MG/1; MG/1; MG/1
1 TABLET ORAL
Qty: 60 TABLET | Refills: 2 | Status: SHIPPED | OUTPATIENT
Start: 2023-09-25 | End: 2023-09-25 | Stop reason: SDUPTHER

## 2023-10-25 ENCOUNTER — OFFICE VISIT (OUTPATIENT)
Dept: OPHTHALMOLOGY | Facility: CLINIC | Age: 68
End: 2023-10-25
Payer: MEDICARE

## 2023-10-25 DIAGNOSIS — Z98.890 HISTORY OF REFRACTIVE SURGERY: ICD-10-CM

## 2023-10-25 DIAGNOSIS — H40.1131 PRIMARY OPEN ANGLE GLAUCOMA (POAG) OF BOTH EYES, MILD STAGE: Primary | ICD-10-CM

## 2023-10-25 DIAGNOSIS — M35.01 KERATITIS SICCA, BILATERAL: ICD-10-CM

## 2023-10-25 DIAGNOSIS — H18.832 RECURRENT CORNEAL EROSION, LEFT: ICD-10-CM

## 2023-10-25 PROCEDURE — 1126F AMNT PAIN NOTED NONE PRSNT: CPT | Mod: CPTII,S$GLB,, | Performed by: OPHTHALMOLOGY

## 2023-10-25 PROCEDURE — 99999 PR PBB SHADOW E&M-EST. PATIENT-LVL III: CPT | Mod: PBBFAC,,, | Performed by: OPHTHALMOLOGY

## 2023-10-25 PROCEDURE — 99213 PR OFFICE/OUTPT VISIT, EST, LEVL III, 20-29 MIN: ICD-10-PCS | Mod: S$GLB,,, | Performed by: OPHTHALMOLOGY

## 2023-10-25 PROCEDURE — 1160F RVW MEDS BY RX/DR IN RCRD: CPT | Mod: CPTII,S$GLB,, | Performed by: OPHTHALMOLOGY

## 2023-10-25 PROCEDURE — 99213 OFFICE O/P EST LOW 20 MIN: CPT | Mod: S$GLB,,, | Performed by: OPHTHALMOLOGY

## 2023-10-25 PROCEDURE — 1159F MED LIST DOCD IN RCRD: CPT | Mod: CPTII,S$GLB,, | Performed by: OPHTHALMOLOGY

## 2023-10-25 PROCEDURE — 1126F PR PAIN SEVERITY QUANTIFIED, NO PAIN PRESENT: ICD-10-PCS | Mod: CPTII,S$GLB,, | Performed by: OPHTHALMOLOGY

## 2023-10-25 PROCEDURE — 99999 PR PBB SHADOW E&M-EST. PATIENT-LVL III: ICD-10-PCS | Mod: PBBFAC,,, | Performed by: OPHTHALMOLOGY

## 2023-10-25 PROCEDURE — 1160F PR REVIEW ALL MEDS BY PRESCRIBER/CLIN PHARMACIST DOCUMENTED: ICD-10-PCS | Mod: CPTII,S$GLB,, | Performed by: OPHTHALMOLOGY

## 2023-10-25 PROCEDURE — 1159F PR MEDICATION LIST DOCUMENTED IN MEDICAL RECORD: ICD-10-PCS | Mod: CPTII,S$GLB,, | Performed by: OPHTHALMOLOGY

## 2023-10-25 NOTE — PROGRESS NOTES
HPI     Glaucoma            Comments: 4 week IOP check on new eye drop: Pt states using drops as   directed once she got a schedule down. No pain or irritation today.   Notices some dryness, just had Restasis refilled.          Comments    **Simbrinza - dizzy, weak      Timolol BID OU  Latanoprost QHS OU  Restasis PRN OU  Systane Hydration BID OU   Refresh Yan tears BID  Last HVF and GOCT 7/7/23  1. Anatomic variant mac degen  2. Cobblestone OD  3. Dry eyes  K sensitivity test NO OU - 7/7/2023   Punctal plugs OU  4. H/O Lasik  5. H/O Iritis OS  6. COAG  GCL 24/31 - 07/07/23  7. RCE OD  8. SLT OU 6-11-20            Last edited by Jessica Baig on 10/25/2023  9:15 AM.            Assessment /Plan     For exam results, see Encounter Report.      ICD-10-CM ICD-9-CM    1. Primary open angle glaucoma (POAG) of both eyes, mild stage  H40.1131 365.11 Doing well - intraocular pressure is within acceptable range relative to target pressure with no evidence of progression.   Continue current treatment.  Reviewed importance of continued compliance with treatment and follow up.        365.71       2. Keratitis sicca, bilateral  M35.01 370.8 Continue current treatment       3. Recurrent corneal erosion, left  H18.832 371.42 Resolved       4. History of refractive surgery  Z98.890 V45.69           Return to clinic 4 months for IOP      Timolol BID OU  Latanoprost QHS OU  Restasis PRN OU  Systane Hydration BID OU   Refresh Yan tears BID

## 2023-11-10 DIAGNOSIS — I10 ESSENTIAL HYPERTENSION: ICD-10-CM

## 2023-11-10 RX ORDER — VALSARTAN AND HYDROCHLOROTHIAZIDE 160; 12.5 MG/1; MG/1
1 TABLET, FILM COATED ORAL DAILY
Qty: 90 TABLET | Refills: 2 | Status: SHIPPED | OUTPATIENT
Start: 2023-11-10

## 2023-11-10 NOTE — TELEPHONE ENCOUNTER
No care due was identified.  [facility] Embedded Care Due Messages. Reference number: [id number].   11/10/2023 8:22:18 AM CST

## 2023-11-10 NOTE — TELEPHONE ENCOUNTER
Refill Decision Note   Yolis Bourgeois  is requesting a refill authorization.  Brief Assessment and Rationale for Refill:  Approve     Medication Therapy Plan:         Comments:     Note composed:1:19 PM 11/10/2023

## 2024-01-17 ENCOUNTER — PATIENT MESSAGE (OUTPATIENT)
Dept: FAMILY MEDICINE | Facility: CLINIC | Age: 69
End: 2024-01-17
Payer: MEDICARE

## 2024-02-16 ENCOUNTER — PATIENT MESSAGE (OUTPATIENT)
Dept: FAMILY MEDICINE | Facility: CLINIC | Age: 69
End: 2024-02-16
Payer: MEDICARE

## 2024-02-16 RX ORDER — PANTOPRAZOLE SODIUM 40 MG/1
40 TABLET, DELAYED RELEASE ORAL DAILY
Qty: 90 TABLET | Refills: 1 | Status: SHIPPED | OUTPATIENT
Start: 2024-02-16

## 2024-02-28 ENCOUNTER — TELEPHONE (OUTPATIENT)
Dept: OPHTHALMOLOGY | Facility: CLINIC | Age: 69
End: 2024-02-28

## 2024-02-28 ENCOUNTER — OFFICE VISIT (OUTPATIENT)
Dept: OPHTHALMOLOGY | Facility: CLINIC | Age: 69
End: 2024-02-28
Payer: MEDICARE

## 2024-02-28 ENCOUNTER — PATIENT MESSAGE (OUTPATIENT)
Dept: OPHTHALMOLOGY | Facility: CLINIC | Age: 69
End: 2024-02-28

## 2024-02-28 DIAGNOSIS — Z98.890 HISTORY OF REFRACTIVE SURGERY: ICD-10-CM

## 2024-02-28 DIAGNOSIS — M35.01 KERATITIS SICCA, BILATERAL: ICD-10-CM

## 2024-02-28 DIAGNOSIS — H18.832 RECURRENT CORNEAL EROSION, LEFT: ICD-10-CM

## 2024-02-28 DIAGNOSIS — H40.1131 PRIMARY OPEN ANGLE GLAUCOMA (POAG) OF BOTH EYES, MILD STAGE: Primary | ICD-10-CM

## 2024-02-28 PROCEDURE — 1159F MED LIST DOCD IN RCRD: CPT | Mod: CPTII,S$GLB,, | Performed by: OPHTHALMOLOGY

## 2024-02-28 PROCEDURE — 1160F RVW MEDS BY RX/DR IN RCRD: CPT | Mod: CPTII,S$GLB,, | Performed by: OPHTHALMOLOGY

## 2024-02-28 PROCEDURE — 99214 OFFICE O/P EST MOD 30 MIN: CPT | Mod: S$GLB,,, | Performed by: OPHTHALMOLOGY

## 2024-02-28 PROCEDURE — 99999 PR PBB SHADOW E&M-EST. PATIENT-LVL II: CPT | Mod: PBBFAC,,, | Performed by: OPHTHALMOLOGY

## 2024-02-28 RX ORDER — DORZOLAMIDE HYDROCHLORIDE AND TIMOLOL MALEATE PRESERVATIVE FREE 20; 5 MG/ML; MG/ML
1 SOLUTION/ DROPS OPHTHALMIC 2 TIMES DAILY
Qty: 60 EACH | Refills: 3 | Status: SHIPPED | OUTPATIENT
Start: 2024-02-28

## 2024-02-28 RX ORDER — CEVIMELINE HYDROCHLORIDE 30 MG/1
30 CAPSULE ORAL 3 TIMES DAILY
Qty: 90 CAPSULE | Refills: 11 | Status: SHIPPED | OUTPATIENT
Start: 2024-02-28 | End: 2025-02-27

## 2024-02-28 NOTE — PROGRESS NOTES
HPI     Glaucoma            Comments: 4 month IOP check. Reports for the last month her dryness has   progressed, OD>OS. Using dry eye treatment as recommended. Using gtts as   recommended.           Comments    1. Anatomic variant mac degen  2. Cobblestone OD  3. Dry eyes  K sensitivity test NO OU - 7/7/2023   Punctal plugs OU  4. H/O Lasik  5. H/O Iritis OS  6. COAG  7. RCE OD  8. SLT OU 6-11-20      **Simbrinza - dizzy, weak      GCL 24/31 -  07/07/23      Timolol BID OU  Latanoprost QHS OU  Restasis PRN OU  Systane Hydration BID OU   Refresh Yan tears BID            Last edited by Leonel Bosch MD on 2/28/2024  9:24 AM.            Assessment /Plan     For exam results, see Encounter Report.      ICD-10-CM ICD-9-CM    1. Primary open angle glaucoma (POAG) of both eyes, mild stage  H40.1131 365.11 IOP above target OD>OS  Switch to Cosopt PF in place of Timolol      365.71       2. Keratitis sicca, bilateral  M35.01 370.8 Add Evoxac       3. Recurrent corneal erosion, left  H18.832 371.42       4. History of refractive surgery  Z98.890 V45.69           Return to clinic 2 months for IOP check/dry eye         PF Cosopt BID OU  Latanoprost QHS OU  Restasis PRN OU  Systane Hydration BID OU   Refresh Yan tears BID

## 2024-03-06 DIAGNOSIS — Z78.0 MENOPAUSE: ICD-10-CM

## 2024-03-14 ENCOUNTER — APPOINTMENT (OUTPATIENT)
Dept: RADIOLOGY | Facility: HOSPITAL | Age: 69
End: 2024-03-14
Attending: FAMILY MEDICINE
Payer: MEDICARE

## 2024-03-14 DIAGNOSIS — Z78.0 MENOPAUSE: ICD-10-CM

## 2024-03-14 PROCEDURE — 77080 DXA BONE DENSITY AXIAL: CPT | Mod: 26,,, | Performed by: RADIOLOGY

## 2024-03-14 PROCEDURE — 77080 DXA BONE DENSITY AXIAL: CPT | Mod: TC

## 2024-03-23 ENCOUNTER — OFFICE VISIT (OUTPATIENT)
Dept: URGENT CARE | Facility: CLINIC | Age: 69
End: 2024-03-23
Payer: MEDICARE

## 2024-03-23 VITALS
TEMPERATURE: 98 F | RESPIRATION RATE: 18 BRPM | BODY MASS INDEX: 28.35 KG/M2 | SYSTOLIC BLOOD PRESSURE: 130 MMHG | WEIGHT: 160 LBS | HEIGHT: 63 IN | OXYGEN SATURATION: 96 % | DIASTOLIC BLOOD PRESSURE: 63 MMHG | HEART RATE: 73 BPM

## 2024-03-23 DIAGNOSIS — S05.02XA CORNEA ABRASION, LEFT, INITIAL ENCOUNTER: Primary | ICD-10-CM

## 2024-03-23 PROCEDURE — 99213 OFFICE O/P EST LOW 20 MIN: CPT | Mod: S$GLB,,, | Performed by: PHYSICIAN ASSISTANT

## 2024-03-23 RX ORDER — ERYTHROMYCIN 5 MG/G
OINTMENT OPHTHALMIC EVERY 4 HOURS
Status: DISCONTINUED | OUTPATIENT
Start: 2024-03-23 | End: 2024-03-23

## 2024-03-23 RX ORDER — ERYTHROMYCIN 5 MG/G
OINTMENT OPHTHALMIC EVERY 4 HOURS
Qty: 1 G | Refills: 0 | Status: SHIPPED | OUTPATIENT
Start: 2024-03-23 | End: 2024-03-30

## 2024-03-23 NOTE — PROGRESS NOTES
"Subjective:      Patient ID: Yolis Bourgeois is a 68 y.o. female.    Vitals:  height is 5' 3" (1.6 m) and weight is 72.6 kg (160 lb). Her oral temperature is 98.1 °F (36.7 °C). Her blood pressure is 130/63 and her pulse is 73. Her respiration is 18 and oxygen saturation is 96%.     Chief Complaint: Eye Pain    C/o left eye pain, x 3 days rates pain 4/10, no known injuries, pt has a medical hx of glaucoma and dry eye and current use eye drops    Eye Pain   The left eye is affected. This is a new problem. The current episode started in the past 7 days. The problem occurs constantly. The problem has been unchanged. There was no injury mechanism. The pain is at a severity of 4/10. The pain is mild. There is No known exposure to pink eye. She Does not wear contacts. Associated symptoms include blurred vision and eye redness. Pertinent negatives include no eye discharge, double vision, fever, foreign body sensation, itching, nausea, photophobia, recent URI or vomiting. She has tried eye drops for the symptoms. The treatment provided mild relief.       Constitution: Negative for fever.   Eyes:  Positive for eye pain, eye redness and blurred vision. Negative for eye discharge, eye itching, photophobia and double vision.   Gastrointestinal:  Negative for nausea and vomiting.      Objective:     Physical Exam   HENT:   Head: Normocephalic.   Ears:   Right Ear: Tympanic membrane normal.   Left Ear: Tympanic membrane normal.   Eyes: Lids are normal. Lids are everted and swept, no foreign bodies found. Left eye exhibits no chemosis, no discharge, no exudate and no hordeolum. No foreign body present in the left eye. Left conjunctiva is injected. Left conjunctiva has no hemorrhage. Left pupil is round, reactive and not sluggish.   Fundoscopic exam:       The left eye shows no exudate and no hemorrhage. The left eye shows red reflex present.   Slit lamp exam:       The left eye shows corneal abrasion and fluorescein uptake.   left " eye Vianca exam negative  Cardiovascular: Normal rate.   Nursing note and vitals reviewed.      Assessment:     1. Cornea abrasion, left, initial encounter        Here with left eye irritation and redness. She denies any eye pain or sudden vision loss. She reports chronic dry eye. She feels she might of cut her eye last week. She denies any wood working or grass mowing. I evaluated the left eye under [facility] and there is a small corneal abrasion. I will start her on erythromycin and have her follow up with her ophthalmologist next week.     Plan:       Cornea abrasion, left, initial encounter  -     erythromycin 5 mg/gram (0.5 %) ophthalmic ointment

## 2024-04-03 ENCOUNTER — OFFICE VISIT (OUTPATIENT)
Dept: OPHTHALMOLOGY | Facility: CLINIC | Age: 69
End: 2024-04-03
Payer: MEDICARE

## 2024-04-03 DIAGNOSIS — M35.01 KERATITIS SICCA, BILATERAL: ICD-10-CM

## 2024-04-03 DIAGNOSIS — H40.1131 PRIMARY OPEN ANGLE GLAUCOMA (POAG) OF BOTH EYES, MILD STAGE: ICD-10-CM

## 2024-04-03 DIAGNOSIS — H18.832 RECURRENT CORNEAL EROSION, LEFT: Primary | ICD-10-CM

## 2024-04-03 PROCEDURE — 99999 PR PBB SHADOW E&M-EST. PATIENT-LVL III: CPT | Mod: PBBFAC,,, | Performed by: OPTOMETRIST

## 2024-04-03 PROCEDURE — 1159F MED LIST DOCD IN RCRD: CPT | Mod: CPTII,S$GLB,, | Performed by: OPTOMETRIST

## 2024-04-03 PROCEDURE — 99213 OFFICE O/P EST LOW 20 MIN: CPT | Mod: S$GLB,,, | Performed by: OPTOMETRIST

## 2024-04-03 NOTE — PROGRESS NOTES
HPI     Eye Problem            Comments: Patient here today following Cornea Abrasion OS  Saw NP at urgent care          Comments    Pain Scale:  0-1  Onset:   1 Week ago  OD, OS, OU:   OS  Discharge:   No  A.M. Matting:  No  Itch:   Np  Redness:   No  Photophobia:   No  Foreign body sensation:   No  Deep pain:   No  Previous occurrence:   No  Drops:   Erythromycin Ointment q4h    1. Anatomic variant mac degen  2. Cobblestone OD  3. Dry eyes  K sensitivity test NO OU - 7/7/2023   Punctal plugs OU  4. H/O Lasik  5. H/O Iritis OS  6. COAG  7. RCE OD  8. SLT OU 6-11-20      **Simbrinza - dizzy, weak      GCL 24/31 - 07/07/23      Timolol BID OU  Latanoprost QHS OU  Restasis PRN OU  Systane Hydration BID OU   Refresh Yan tears BID               Last edited by Hien Rivas, PCT on 4/3/2024  9:30 AM.            Assessment /Plan     For exam results, see Encounter Report.    Recurrent corneal erosion, left  Resolved, no signs of K abrasion on exam today, discussed refresh gel at bedtime with aggressive tear lubrication 3-4 times daily.     Primary open angle glaucoma (POAG) of both eyes, mild stage  IOP improved today, continue per Dr Bosch    Keratitis sicca, bilateral  Continue drops as directed below:  Cosopt BID  Latanoprost QHS  Restasis PRN  Systane Hydration BID  Refresh Mercer tears BID    RTC with Dr Bosch as scheduled for glaucoma exams, DKT PRN.

## 2024-04-04 ENCOUNTER — HOSPITAL ENCOUNTER (OUTPATIENT)
Dept: RADIOLOGY | Facility: HOSPITAL | Age: 69
Discharge: HOME | End: 2024-04-04
Attending: FAMILY MEDICINE
Payer: MEDICARE

## 2024-04-04 DIAGNOSIS — Z12.31 ENCOUNTER FOR SCREENING MAMMOGRAM FOR BREAST CANCER: ICD-10-CM

## 2024-04-04 PROCEDURE — 77063 BREAST TOMOSYNTHESIS BI: CPT | Mod: 26,,, | Performed by: RADIOLOGY

## 2024-04-04 PROCEDURE — 77067 SCR MAMMO BI INCL CAD: CPT | Mod: TC,PN

## 2024-04-04 PROCEDURE — 77067 SCR MAMMO BI INCL CAD: CPT | Mod: 26,,, | Performed by: RADIOLOGY

## 2024-04-05 ENCOUNTER — PATIENT MESSAGE (OUTPATIENT)
Dept: OPHTHALMOLOGY | Facility: CLINIC | Age: 69
End: 2024-04-05
Payer: MEDICARE

## 2024-05-24 ENCOUNTER — HOSPITAL ENCOUNTER (OUTPATIENT)
Dept: RADIOLOGY | Facility: HOSPITAL | Age: 69
Discharge: HOME | End: 2024-05-24
Attending: UROLOGY
Payer: MEDICARE

## 2024-05-24 DIAGNOSIS — N28.1 RENAL CYST: ICD-10-CM

## 2024-05-24 PROCEDURE — 76770 US EXAM ABDO BACK WALL COMP: CPT | Mod: TC,PN

## 2024-05-24 PROCEDURE — 76770 US EXAM ABDO BACK WALL COMP: CPT | Mod: 26,,, | Performed by: RADIOLOGY

## 2024-05-28 ENCOUNTER — OFFICE VISIT (OUTPATIENT)
Dept: UROLOGY | Facility: CLINIC | Age: 69
End: 2024-05-28
Payer: MEDICARE

## 2024-05-28 VITALS — DIASTOLIC BLOOD PRESSURE: 83 MMHG | SYSTOLIC BLOOD PRESSURE: 138 MMHG | HEART RATE: 66 BPM | RESPIRATION RATE: 16 BRPM

## 2024-05-28 DIAGNOSIS — N28.1 RENAL CYST: Primary | ICD-10-CM

## 2024-05-28 PROCEDURE — 1160F RVW MEDS BY RX/DR IN RCRD: CPT | Mod: CPTII,S$GLB,, | Performed by: UROLOGY

## 2024-05-28 PROCEDURE — 3079F DIAST BP 80-89 MM HG: CPT | Mod: CPTII,S$GLB,, | Performed by: UROLOGY

## 2024-05-28 PROCEDURE — 3288F FALL RISK ASSESSMENT DOCD: CPT | Mod: CPTII,S$GLB,, | Performed by: UROLOGY

## 2024-05-28 PROCEDURE — 3075F SYST BP GE 130 - 139MM HG: CPT | Mod: CPTII,S$GLB,, | Performed by: UROLOGY

## 2024-05-28 PROCEDURE — 1159F MED LIST DOCD IN RCRD: CPT | Mod: CPTII,S$GLB,, | Performed by: UROLOGY

## 2024-05-28 PROCEDURE — 99214 OFFICE O/P EST MOD 30 MIN: CPT | Mod: S$GLB,,, | Performed by: UROLOGY

## 2024-05-28 PROCEDURE — 99999 PR PBB SHADOW E&M-EST. PATIENT-LVL III: CPT | Mod: PBBFAC,,, | Performed by: UROLOGY

## 2024-05-28 PROCEDURE — 1101F PT FALLS ASSESS-DOCD LE1/YR: CPT | Mod: CPTII,S$GLB,, | Performed by: UROLOGY

## 2024-05-28 NOTE — PROGRESS NOTES
Chief Complaint: Renal cysts    HPI:   05/28/2024 - returns today for follow-up and review of her ultrasound, this shows increase in size of her complex renal cyst with some increased complexity including a possible solid component, there is no Doppler flow, still has some stress incontinence, wears three panty liners per day, not overly bothersome to her, no gross hematuria or dysuria    05/26/2023 - patient returns today for follow-up, no new issues in the interim, renal ultrasound shows stable cysts, having some stress incontinence and wears three thin panty liners per day, previously saw pelvic floor physical therapy and found it helpful, she would like to go back and see them, denies any gross hematuria or UTIs    05/25/2022 - patient presents today for follow-up, no issues in the interim, renal ultrasound several days ago showed stable renal cysts, no voiding issues, no gross hematuria    5/21/20: Reviewed history in detail. Feeling fine.  DORY about the same.   Indep assessment of imaging agree with report:  Renal cyst same right kidney.  No time for PFMT her  is having severe complications of multiple maladies.  5/20/19: Reviewed history in detail. Feeling fine, no problems.  Cyst same as before smaller if anything.  Some DORY not needing a pad.  5/17/18: No problems in the interval. Bilateral cysts same.  5/11/17: 62 yo woman followed for 4 years for renal cysts.  Had a pyelonephritis on the way.  Has a 2.5 cm right Adal II cyst that looks stable over many years.    Allergies:  Climara [estradiol]    Medications: has a current medication list which includes the following prescription(s): aspirin, butalbital-acetaminophen-caffeine -40 mg, cevimeline, cyclosporine, dorzolamide-timolol (pf), finasteride, latanoprost, metoprolol tartrate, multivitamin with minerals, pantoprazole, thyroid (pork), valsartan-hydrochlorothiazide, dorzolamide, and timolol maleate 0.5%.    Review of Systems:  General: No  fever, chills  Skin: No rashes  Chest:  Denies cough and sputum production  Heart: Denies chest pain  Resp: Denies dyspnea  Abdomen: Denies diarrhea, abdominal pain, hematemesis, or blood in stool.  Musculoskeletal: No joint stiffness or swelling. Denies back pain.  : see HPI  Neuro: no dizziness or weakness      PMH:   has a past medical history of Chronic constipation, Depression, Dry eyes, Endometriosis of broad ligament, Hypothyroidism, Macular degeneration, Migraine headache, Osteoarthritis of hand, PAF (paroxysmal atrial fibrillation), Primary open angle glaucoma (POAG) of both eyes, mild stage (04/24/2018), and Seasonal allergic rhinitis.    PSH:   has a past surgical history that includes Lipoma resection (2010); Total abdominal hysterectomy w/ bilateral salpingoophorectomy (03/2008); Cervical conization w/ laser; Dilation and curettage of uterus; LASIK (2007); Argon Trabeculoplasty - OU - Both Eyes (Bilateral, 06/11/2020); Colonoscopy (N/A, 08/18/2022); Adenoidectomy (1962); Eye surgery (July 2020); Tonsillectomy (1962); Tubal ligation (1980); and Hysterectomy (03/2008).    FamHx: family history includes Arthritis in her father; Dementia in her mother; Depression in her mother; Diabetes in her father; Glaucoma in her maternal uncle; Heart disease in her father and mother; Heart failure in her father; Hypertension in her father and mother; No Known Problems in her brother; Vision loss in her mother.    SocHx:  reports that she quit smoking about 41 years ago. Her smoking use included cigarettes. She started smoking about 56 years ago. She has a 15 pack-year smoking history. She has never used smokeless tobacco. She reports current alcohol use. She reports that she does not use drugs.     Physical Exam:  Vitals:   Vitals:    05/28/24 1044   BP: 138/83   Pulse: 66   Resp: 16     General: awake, alert, cooperative  Head: NC/AT  Ears: external ears normal  Eyes: sclera normal  Lungs: normal inspiration,  NAD  Heart: well-perfused  Skin: The skin is warm and dry.  Ext: No c/c/e.  Neuro: grossly intact, AOx3      Labs/Studies:   US RETROPERITONEAL COMPLETE     CLINICAL HISTORY:  Cyst of kidney, acquired     TECHNIQUE:  Ultrasound of the kidneys and urinary bladder was performed including color flow and Doppler evaluation of the kidneys.     COMPARISON:  May 19, 2023     FINDINGS:  Right kidney: The right kidney measures 10.1 cm. No cortical thinning. No loss of corticomedullary distinction. Resistive index measures 0.66.  The right lower pole complex cystic mass has increased in size by 5 mm, currently measuring a maximum of 1.4 cm in length.  No renal stone. No hydronephrosis.     Left kidney: The left kidney measures 10.2 cm. No cortical thinning. No loss of corticomedullary distinction. Resistive index measures 0.60.  Stable 9 mm cyst with rim calcification in the midpole.  No renal stone. No hydronephrosis.     The bladder is partially distended at the time of scanning and has an unremarkable appearance.     Impression:     1.  Detrimental change.  A complicated cyst in the inferior right kidney has increased in size by 5 mm, and there is some central solid component currently.  Further characterization with a three-phase kidney CT scan or kidney MRI is recommended, to determine its Bosniak classification.     2.  Stable 9 mm cyst with rim calcification in the left mid kidney.     3.  Otherwise, normal study.    Lab Results   Component Value Date    WBC 5.17 08/15/2023    HGB 13.8 08/15/2023    HCT 41.6 08/15/2023     08/15/2023     08/15/2023    K 4.1 08/15/2023     08/15/2023    CREATININE 0.8 08/15/2023    BUN 16 08/15/2023    CO2 24 08/15/2023    TSH 0.047 (L) 08/15/2023    INR 1.0 10/13/2015    HGBA1C 5.4 12/29/2022    CHOL 154 08/15/2023    TRIG 98 08/15/2023    HDL 49 08/15/2023    ALT 17 08/15/2023    AST 20 08/15/2023       Impression/Plan:   Minimally complex renal cyst - new possible  solid component, obtain CT renal mass, follow up for virtual visit to discuss    DORY - discussed bulkamid, she will consider    Panfilo Light MD

## 2024-05-29 ENCOUNTER — LAB VISIT (OUTPATIENT)
Dept: LAB | Facility: HOSPITAL | Age: 69
End: 2024-05-29
Attending: UROLOGY
Payer: MEDICARE

## 2024-05-29 DIAGNOSIS — N28.1 RENAL CYST: ICD-10-CM

## 2024-05-29 LAB
CREAT SERPL-MCNC: 0.7 MG/DL (ref 0.5–1.4)
EST. GFR  (NO RACE VARIABLE): >60 ML/MIN/1.73 M^2

## 2024-05-29 PROCEDURE — 36415 COLL VENOUS BLD VENIPUNCTURE: CPT | Performed by: UROLOGY

## 2024-05-29 PROCEDURE — 82565 ASSAY OF CREATININE: CPT | Performed by: UROLOGY

## 2024-05-30 ENCOUNTER — HOSPITAL ENCOUNTER (OUTPATIENT)
Dept: RADIOLOGY | Facility: HOSPITAL | Age: 69
Discharge: HOME | End: 2024-05-30
Attending: UROLOGY
Payer: MEDICARE

## 2024-05-30 DIAGNOSIS — N28.1 RENAL CYST: ICD-10-CM

## 2024-05-30 PROCEDURE — 25500020 PHARM REV CODE 255: Mod: PN | Performed by: UROLOGY

## 2024-05-30 PROCEDURE — 74178 CT ABD&PLV WO CNTR FLWD CNTR: CPT | Mod: 26,,, | Performed by: RADIOLOGY

## 2024-05-30 PROCEDURE — 74178 CT ABD&PLV WO CNTR FLWD CNTR: CPT | Mod: TC,PN

## 2024-05-30 RX ADMIN — IOHEXOL 75 ML: 350 INJECTION, SOLUTION INTRAVENOUS at 08:05

## 2024-06-27 ENCOUNTER — OFFICE VISIT (OUTPATIENT)
Dept: UROLOGY | Facility: CLINIC | Age: 69
End: 2024-06-27
Payer: MEDICARE

## 2024-06-27 DIAGNOSIS — N28.9 RENAL LESION: Primary | ICD-10-CM

## 2024-06-27 DIAGNOSIS — N28.89 OTHER SPECIFIED DISORDERS OF KIDNEY AND URETER: ICD-10-CM

## 2024-06-27 DIAGNOSIS — N39.3 STRESS INCONTINENCE: ICD-10-CM

## 2024-06-27 NOTE — PROGRESS NOTES
The patient location is: [place]  The chief complaint leading to consultation is: right renal lesion    Visit type: audiovisual    Face to Face time with patient: 10:30  12 minutes of total time spent on the encounter, which includes face to face time and non-face to face time preparing to see the patient (eg, review of tests), Obtaining and/or reviewing separately obtained history, Documenting clinical information in the electronic or other health record, Independently interpreting results (not separately reported) and communicating results to the patient/family/caregiver, or Care coordination (not separately reported).     Each patient to whom he or she provides medical services by telemedicine is:  (1) informed of the relationship between the physician and patient and the respective role of any other health care provider with respect to management of the patient; and (2) notified that he or she may decline to receive medical services by telemedicine and may withdraw from such care at any time.      Chief Complaint: Renal cysts    HPI:   06/27/2024 - patient returns today for follow-up and review of her CT, this shows right lower pole Bosniak two F lesion with possible soft tissue component, no other suspicious or concerning findings, has thought about the bulkamid and wants to proceed with this    05/28/2024 - returns today for follow-up and review of her ultrasound, this shows increase in size of her complex renal cyst with some increased complexity including a possible solid component, there is no Doppler flow, still has some stress incontinence, wears three panty liners per day, not overly bothersome to her, no gross hematuria or dysuria    05/26/2023 - patient returns today for follow-up, no new issues in the interim, renal ultrasound shows stable cysts, having some stress incontinence and wears three thin panty liners per day, previously saw pelvic floor physical therapy and found it helpful, she would like  to go back and see them, denies any gross hematuria or UTIs    05/25/2022 - patient presents today for follow-up, no issues in the interim, renal ultrasound several days ago showed stable renal cysts, no voiding issues, no gross hematuria    5/21/20: Reviewed history in detail. Feeling fine.  DORY about the same.   Indep assessment of imaging agree with report:  Renal cyst same right kidney.  No time for PFMT her  is having severe complications of multiple maladies.  5/20/19: Reviewed history in detail. Feeling fine, no problems.  Cyst same as before smaller if anything.  Some DORY not needing a pad.  5/17/18: No problems in the interval. Bilateral cysts same.  5/11/17: 60 yo woman followed for 4 years for renal cysts.  Had a pyelonephritis on the way.  Has a 2.5 cm right Adal II cyst that looks stable over many years.    Allergies:  Climara [estradiol]    Medications: has a current medication list which includes the following prescription(s): aspirin, butalbital-acetaminophen-caffeine -40 mg, cevimeline, cyclosporine, dorzolamide, dorzolamide-timolol (pf), finasteride, latanoprost, metoprolol tartrate, multivitamin with minerals, pantoprazole, thyroid (pork), timolol maleate 0.5%, and valsartan-hydrochlorothiazide.    Review of Systems:  General: No fever, chills  Skin: No rashes  Chest:  Denies cough and sputum production  Heart: Denies chest pain  Resp: Denies dyspnea  Abdomen: Denies diarrhea, abdominal pain, hematemesis, or blood in stool.  Musculoskeletal: No joint stiffness or swelling. Denies back pain.  : see HPI  Neuro: no dizziness or weakness      PMH:   has a past medical history of Chronic constipation, Depression, Dry eyes, Endometriosis of broad ligament, Hypothyroidism, Macular degeneration, Migraine headache, Osteoarthritis of hand, PAF (paroxysmal atrial fibrillation), Primary open angle glaucoma (POAG) of both eyes, mild stage (04/24/2018), and Seasonal allergic rhinitis.    PSH:    has a past surgical history that includes Lipoma resection (2010); Total abdominal hysterectomy w/ bilateral salpingoophorectomy (03/2008); Cervical conization w/ laser; Dilation and curettage of uterus; LASIK (2007); Argon Trabeculoplasty - OU - Both Eyes (Bilateral, 06/11/2020); Colonoscopy (N/A, 08/18/2022); Adenoidectomy (1962); Eye surgery (July 2020); Tonsillectomy (1962); Tubal ligation (1980); and Hysterectomy (03/2008).    FamHx: family history includes Arthritis in her father; Dementia in her mother; Depression in her mother; Diabetes in her father; Glaucoma in her maternal uncle; Heart disease in her father and mother; Heart failure in her father; Hypertension in her father and mother; No Known Problems in her brother; Vision loss in her mother.    SocHx:  reports that she quit smoking about 41 years ago. Her smoking use included cigarettes. She started smoking about 56 years ago. She has a 15 pack-year smoking history. She has never used smokeless tobacco. She reports current alcohol use. She reports that she does not use drugs.     Physical Exam:  Vitals:   There were no vitals filed for this visit.  General: awake, alert, cooperative  Head: NC/AT  Ears: external ears normal  Eyes: sclera normal  Lungs: normal inspiration, NAD  Heart: well-perfused  Skin: The skin is warm and dry.  Ext: No c/c/e.  Neuro: grossly intact, AOx3      Labs/Studies:   CT ABDOMEN PELVIS W WO CONTRAST     CLINICAL HISTORY:  right complex renal cyst;Cyst of kidney, acquired     TECHNIQUE:  Low dose axial, sagittal and coronal reformations were obtained from the lung bases to the pubic symphysis before and following the IV administration of 100 mL of Omnipaque 350.  Timing was optimized for nephrogram and excretory renal phases.     COMPARISON:  05/24/2024     FINDINGS:  Lung bases are clear.  Heart sizes normal.  No pleural effusion or pericardial effusion.     Liver is normal in size with no focal abnormalities.  Gallbladder is  unremarkable.  Spleen is normal.  Adrenals and pancreas are unremarkable.     Kidneys are normal in size without mass.  Cyst with single septation within the lower pole the right kidney thought to be a Bosniak 2F lesion which requires follow-up.  There is an adjacent area with slightly increased attenuation than the primary cyst.  Cannot entirely exclude a soft tissue component.  Recommend 3 month interval follow-up CT or MRI.  Findings do correlate with ultrasound findings.  Small cyst within the midpole of the left kidney.  No evidence of hydronephrosis or ureteral calculus.  Bladder is nondistended which limits evaluation.     Shotty nodes are seen within the retroperitoneum.  Aorta demonstrates no significant atherosclerotic disease.     Small and large bowel demonstrate no evidence of obstruction or focal abnormality.  Appendix is normal.     No evidence of free-fluid or free-air within the abdomen or pelvis.     Bones demonstrate mild degenerative changes.     Impression:     Cyst with single septation within the lower pole the right kidney thought to be a Bosniak 2F lesion which requires follow-up.  There is an adjacent area with slightly increased attenuation than the primary cyst. Cannot entirely exclude a soft tissue component as the size is below threshold 4 full characterization..  Recommend 3 month interval follow-up CT or MRI.    Lab Results   Component Value Date    WBC 5.17 08/15/2023    HGB 13.8 08/15/2023    HCT 41.6 08/15/2023     08/15/2023     08/15/2023    K 4.1 08/15/2023     08/15/2023    CREATININE 0.7 05/29/2024    BUN 16 08/15/2023    CO2 24 08/15/2023    TSH 0.047 (L) 08/15/2023    INR 1.0 10/13/2015    HGBA1C 5.4 12/29/2022    CHOL 154 08/15/2023    TRIG 98 08/15/2023    HDL 49 08/15/2023    ALT 17 08/15/2023    AST 20 08/15/2023       Impression/Plan:   Minimally complex renal cyst - Bosniak 2 F on CT, reviewed options including active surveillance versus treatment,  would be hesitant to perform a partial nephrectomy due to the lesion being very small and completely endophytic, would recommend cryoablation if she wants to proceed with treatment, patient notes that she would prefer active surveillance at this time, we will plan for MRI in three months    DORY - patient wants to proceed with bulkamid, will have her follow-up for cystoscopy and pelvic exam prior to scheduling    Panfilo Light MD

## 2024-07-09 ENCOUNTER — HOSPITAL ENCOUNTER (OUTPATIENT)
Dept: RADIOLOGY | Facility: HOSPITAL | Age: 69
Discharge: HOME | End: 2024-07-09
Attending: UROLOGY
Payer: MEDICARE

## 2024-07-09 ENCOUNTER — HOSPITAL ENCOUNTER (OUTPATIENT)
Dept: CARDIOLOGY | Facility: HOSPITAL | Age: 69
Discharge: HOME | End: 2024-07-09
Attending: UROLOGY
Payer: MEDICARE

## 2024-07-09 ENCOUNTER — PROCEDURE VISIT (OUTPATIENT)
Dept: UROLOGY | Facility: CLINIC | Age: 69
End: 2024-07-09
Payer: MEDICARE

## 2024-07-09 DIAGNOSIS — N39.3 STRESS INCONTINENCE: Primary | ICD-10-CM

## 2024-07-09 DIAGNOSIS — Z01.818 PRE-OP TESTING: ICD-10-CM

## 2024-07-09 LAB
OHS QRS DURATION: 90 MS
OHS QTC CALCULATION: 376 MS

## 2024-07-09 PROCEDURE — 52000 CYSTOURETHROSCOPY: CPT | Mod: S$GLB,,, | Performed by: UROLOGY

## 2024-07-09 PROCEDURE — 93010 ELECTROCARDIOGRAM REPORT: CPT | Mod: ,,, | Performed by: INTERNAL MEDICINE

## 2024-07-09 PROCEDURE — 71046 X-RAY EXAM CHEST 2 VIEWS: CPT | Mod: TC

## 2024-07-09 PROCEDURE — 99214 OFFICE O/P EST MOD 30 MIN: CPT | Mod: 25,S$GLB,, | Performed by: UROLOGY

## 2024-07-09 PROCEDURE — 93005 ELECTROCARDIOGRAM TRACING: CPT

## 2024-07-09 PROCEDURE — 71046 X-RAY EXAM CHEST 2 VIEWS: CPT | Mod: 26,,, | Performed by: RADIOLOGY

## 2024-07-09 RX ORDER — CEFAZOLIN SODIUM 2 G/50ML
2 SOLUTION INTRAVENOUS
OUTPATIENT
Start: 2024-07-09

## 2024-07-09 NOTE — PROGRESS NOTES
Chief Complaint: Renal cysts    HPI:   07/09/2024 - patient returns today for cystoscopy and pelvic exam, notes issues with stress incontinence, still wearing 2-3 panty liners per day, more bothered than she used to be and would like to proceed with a procedure    06/27/2024 - patient returns today for follow-up and review of her CT, this shows right lower pole Bosniak two F lesion with possible soft tissue component, no other suspicious or concerning findings, has thought about the bulkamid and wants to proceed with this    05/28/2024 - returns today for follow-up and review of her ultrasound, this shows increase in size of her complex renal cyst with some increased complexity including a possible solid component, there is no Doppler flow, still has some stress incontinence, wears three panty liners per day, not overly bothersome to her, no gross hematuria or dysuria    05/26/2023 - patient returns today for follow-up, no new issues in the interim, renal ultrasound shows stable cysts, having some stress incontinence and wears three thin panty liners per day, previously saw pelvic floor physical therapy and found it helpful, she would like to go back and see them, denies any gross hematuria or UTIs    05/25/2022 - patient presents today for follow-up, no issues in the interim, renal ultrasound several days ago showed stable renal cysts, no voiding issues, no gross hematuria    5/21/20: Reviewed history in detail. Feeling fine.  DORY about the same.   Indep assessment of imaging agree with report:  Renal cyst same right kidney.  No time for PFMT her  is having severe complications of multiple maladies.  5/20/19: Reviewed history in detail. Feeling fine, no problems.  Cyst same as before smaller if anything.  Some DORY not needing a pad.  5/17/18: No problems in the interval. Bilateral cysts same.  5/11/17: 62 yo woman followed for 4 years for renal cysts.  Had a pyelonephritis on the way.  Has a 2.5 cm right  Adal II cyst that looks stable over many years.    Allergies:  Climara [estradiol]    Medications: has a current medication list which includes the following prescription(s): aspirin, butalbital-acetaminophen-caffeine -40 mg, cevimeline, cyclosporine, dorzolamide, dorzolamide-timolol (pf), finasteride, latanoprost, metoprolol tartrate, multivitamin with minerals, pantoprazole, thyroid (pork), timolol maleate 0.5%, and valsartan-hydrochlorothiazide.    Review of Systems:  General: No fever, chills  Skin: No rashes  Chest:  Denies cough and sputum production  Heart: Denies chest pain  Resp: Denies dyspnea  Abdomen: Denies diarrhea, abdominal pain, hematemesis, or blood in stool.  Musculoskeletal: No joint stiffness or swelling. Denies back pain.  : see HPI  Neuro: no dizziness or weakness      PMH:   has a past medical history of Chronic constipation, Depression, Dry eyes, Endometriosis of broad ligament, Hypothyroidism, Macular degeneration, Migraine headache, Osteoarthritis of hand, PAF (paroxysmal atrial fibrillation), Primary open angle glaucoma (POAG) of both eyes, mild stage (04/24/2018), and Seasonal allergic rhinitis.    PSH:   has a past surgical history that includes Lipoma resection (2010); Total abdominal hysterectomy w/ bilateral salpingoophorectomy (03/2008); Cervical conization w/ laser; Dilation and curettage of uterus; LASIK (2007); Argon Trabeculoplasty - OU - Both Eyes (Bilateral, 06/11/2020); Colonoscopy (N/A, 08/18/2022); Adenoidectomy (1962); Eye surgery (July 2020); Tonsillectomy (1962); Tubal ligation (1980); and Hysterectomy (03/2008).    FamHx: family history includes Arthritis in her father; Dementia in her mother; Depression in her mother; Diabetes in her father; Glaucoma in her maternal uncle; Heart disease in her father and mother; Heart failure in her father; Hypertension in her father and mother; No Known Problems in her brother; Vision loss in her mother.    SocHx:  reports that  she quit smoking about 41 years ago. Her smoking use included cigarettes. She started smoking about 56 years ago. She has a 15 pack-year smoking history. She has never used smokeless tobacco. She reports current alcohol use. She reports that she does not use drugs.     Physical Exam:  General: awake, alert, cooperative  Head: NC/AT  Ears: external ears normal  Eyes: sclera normal  Lungs: normal inspiration, NAD  Heart: well-perfused  Abdomen: Soft, NT, ND  : External genitalia normal. No lesions. Meatus normal size and location. Urethra normal. + DORY with cough and valsalva, No masses. Bladder normal. No fullness or masses. Vagina normal with scant discharge and no lesions. Anus/perineum normal. Uterus and adnexa without palpable abnormalities.  Skin: The skin is warm and dry.  Ext: No c/c/e.  Neuro: grossly intact, AOx3        Labs/Studies:   CT ABDOMEN PELVIS W WO CONTRAST     CLINICAL HISTORY:  right complex renal cyst;Cyst of kidney, acquired     TECHNIQUE:  Low dose axial, sagittal and coronal reformations were obtained from the lung bases to the pubic symphysis before and following the IV administration of 100 mL of Omnipaque 350.  Timing was optimized for nephrogram and excretory renal phases.     COMPARISON:  05/24/2024     FINDINGS:  Lung bases are clear.  Heart sizes normal.  No pleural effusion or pericardial effusion.     Liver is normal in size with no focal abnormalities.  Gallbladder is unremarkable.  Spleen is normal.  Adrenals and pancreas are unremarkable.     Kidneys are normal in size without mass.  Cyst with single septation within the lower pole the right kidney thought to be a Bosniak 2F lesion which requires follow-up.  There is an adjacent area with slightly increased attenuation than the primary cyst.  Cannot entirely exclude a soft tissue component.  Recommend 3 month interval follow-up CT or MRI.  Findings do correlate with ultrasound findings.  Small cyst within the midpole of the left  kidney.  No evidence of hydronephrosis or ureteral calculus.  Bladder is nondistended which limits evaluation.     Shotty nodes are seen within the retroperitoneum.  Aorta demonstrates no significant atherosclerotic disease.     Small and large bowel demonstrate no evidence of obstruction or focal abnormality.  Appendix is normal.     No evidence of free-fluid or free-air within the abdomen or pelvis.     Bones demonstrate mild degenerative changes.     Impression:     Cyst with single septation within the lower pole the right kidney thought to be a Bosniak 2F lesion which requires follow-up.  There is an adjacent area with slightly increased attenuation than the primary cyst. Cannot entirely exclude a soft tissue component as the size is below threshold 4 full characterization..  Recommend 3 month interval follow-up CT or MRI.    Lab Results   Component Value Date    WBC 5.17 08/15/2023    HGB 13.8 08/15/2023    HCT 41.6 08/15/2023     08/15/2023     08/15/2023    K 4.1 08/15/2023     08/15/2023    CREATININE 0.7 05/29/2024    BUN 16 08/15/2023    CO2 24 08/15/2023    TSH 0.047 (L) 08/15/2023    INR 1.0 10/13/2015    HGBA1C 5.4 12/29/2022    CHOL 154 08/15/2023    TRIG 98 08/15/2023    HDL 49 08/15/2023    ALT 17 08/15/2023    AST 20 08/15/2023     Procedure:  Diagnostic Cystoscopy    Indications:  LUTS    UA: normal, see lab results for values    Procedure in Detail: After proper consents were obtained, the patient was prepped and draped in normal sterile fashion for diagnostic cystoscopy. 5 ml of lidocaine jelly was instilled in the urethra. The flexible cystoscope was then introduced into the urethra, and advanced into the bladder under direct vision. The urethral mucosa appeared normal, and no strictures were noted. The sphincter was normal. The bladder neck was normal. Inspection of the interior of the bladder was then carried out. The trigone was unremarkable, with no mucosal lesions. The  ureteral orifices were normal in position and configuration. Systematic inspection of the mucosa of the bladder it was then carried out, rotating the cystoscope so that all areas of the left and right lateral walls, the dome of the bladder, and the posterior wall were all visualized. The cystoscope was then advanced further into the bladder, and maximum deflection of the scope was performed so that the bladder neck could be inspected. No mucosal lesions were noted there. The cystoscope was then removed, and the procedure terminated. Patient tolerated the procedure well. No complications.     Findings: normal bladder, no tumors    Impression/Plan:   DORY - +DORY with cough and valsalva, reviewed options including bulkamid, PFPT, and sling placement, reviewed risks/benefits/alternatives, patient would like to proceed with bulkamid    Minimally complex renal cyst - Bosniak 2 F on CT, reviewed options including active surveillance versus treatment, would be hesitant to perform a partial nephrectomy due to the lesion being very small and completely endophytic, would recommend cryoablation if she wants to proceed with treatment, patient notes that she would prefer active surveillance at this time, f/u as scheduled with MRI    Panfilo Light MD

## 2024-07-17 RX ORDER — CYCLOSPORINE 0.5 MG/ML
1 EMULSION OPHTHALMIC 2 TIMES DAILY
Qty: 60 EACH | Refills: 12 | Status: SHIPPED | OUTPATIENT
Start: 2024-07-17 | End: 2025-07-17

## 2024-07-26 ENCOUNTER — TELEPHONE (OUTPATIENT)
Dept: PREADMISSION TESTING | Facility: HOSPITAL | Age: 69
End: 2024-07-26
Payer: MEDICARE

## 2024-07-26 NOTE — TELEPHONE ENCOUNTER
Pre op instructions reviewed with pt over telephone, verbalized understanding.    To confirm, Surgery is scheduled on 7/31/24. We will call you after 2pm the day before Surgery with your arrival time.    *Please report to the [facility] Lobby (1st Floor) located off of [facility] (2nd Entrance/Building on the left, in front of the flag pole).  Address: [address] [place] [address] [place] [address]      INSTRUCTIONS IMPORTANT!!!  DO NOT Eat, Drink, or Smoke after 12 midnight unless instructed otherwise by your Surgeon. OK to brush teeth, no gum, candy or mints!    >>>MEDICATION INSTRUCTIONS<<<: Morning of Surgery, take small sip of water with ONLY these medications:  Eye drops  Metoprolol  Protonix   [place] Thyroid       *Diabetic/ Prediabetic Patients: !!!If you take diabetic or weight loss medication, Do NOT take morning of surgery unless instructed by Doctor!!!  Metformin to be stopped 24 hrs prior to surgery.   Long Acting Insulin Instructions: HOLD the night before surgery unless instructed differently by Provider!  Ozempic/ Mounjaro/ Wegovy/ Trulicity/ Semaglutide injections or weight loss medication to be stopped 7 days prior to surgery.    !!!STOP ALL Aspirins, NSAIDS, WEIGHT LOSS INJECTIONS/PILLS, Herbal supplements, & Vitamins 7 DAYS BEFORE SURGERY!!!    ____  Avoid Alcoholic beverages 3 days prior to surgery, as it can thin the blood.  ____  NO Acrylic/fake nails or nail polish worn day of surgery (specifically hand/arm & foot surgeries).  ____  NO powder, lotions, deodorants, oils or cream on body.  ____  Remove all jewelry & piercings & foreign objects before arrival & leave at home.  ____  Remove Dentures, Hearing Aids & Contact Lens prior to surgery.  ____  Bring photo ID and insurance information to hospital (Leave Valuables at Home).  ____  If going home the same day, arrange for a ride home. You will not be able to drive for 24 hrs if Anesthesia was used.   ____  Females (ages 11-60): may  need to give a urine sample the morning of surgery; please see Pre op Nurse prior to using the restroom.  ____  Males: Stop ED medications (Viagra, Cialis) 24 hrs prior to surgery.  ____  Wear clean, loose fitting clothing to allow for dressings/ bandages.      Bathing Instructions:    -Shower with anti-bacterial Soap (Hibiclens or Dial) the night before surgery and the morning of.   -Do not use Hibiclens on your face or genitals.   -Apply clean clothes after shower.  -Do not shave your face or body 2 days prior to surgery unless instructed otherwise by your Surgeon.  -Do not shave pubic hair 7 days prior to surgery (gyn pt's).    [facility] Visitor/Ride Policy:  Only 2 adults allowed in pre op/recovery area during your procedure. You MUST HAVE A RIDE HOME from a responsible adult that you know and trust. Medical Transport, Uber or Lyft can ONLY be used if patient has a responsible adult to accompany them during ride home.       *Signs and symptoms of Infection Before or After Surgery:               !!!If you experience any fever, chills, nausea/ vomiting, foul odor/ excessive drainage from surgical site, flu-like symptoms, new wounds or cuts, PLEASE CALL THE SURGEON OFFICE at [phone] or SEND MESSAGE THROUGH [facility]!!!     *If you are running late the morning of surgery, please call the [facility] Surgery Dept @ [phone].     *Billing questions:  [phone]     Thank you,  -[facility] Pre [facility]mit Dept.  [phone] or [phone]  M-F 7:30 am-4:00 pm (Closed Major Holidays)

## 2024-07-29 DIAGNOSIS — H40.1131 PRIMARY OPEN ANGLE GLAUCOMA (POAG) OF BOTH EYES, MILD STAGE: ICD-10-CM

## 2024-07-29 DIAGNOSIS — M35.01 KERATITIS SICCA, BILATERAL: ICD-10-CM

## 2024-07-30 ENCOUNTER — ANESTHESIA EVENT (OUTPATIENT)
Dept: SURGERY | Facility: HOSPITAL | Age: 69
End: 2024-07-30
Payer: MEDICARE

## 2024-07-30 ENCOUNTER — OFFICE VISIT (OUTPATIENT)
Dept: OPHTHALMOLOGY | Facility: CLINIC | Age: 69
End: 2024-07-30
Payer: MEDICARE

## 2024-07-30 ENCOUNTER — TELEPHONE (OUTPATIENT)
Dept: PREADMISSION TESTING | Facility: HOSPITAL | Age: 69
End: 2024-07-30
Payer: MEDICARE

## 2024-07-30 DIAGNOSIS — M35.01 KERATITIS SICCA, BILATERAL: ICD-10-CM

## 2024-07-30 DIAGNOSIS — H40.1131 PRIMARY OPEN ANGLE GLAUCOMA (POAG) OF BOTH EYES, MILD STAGE: Primary | ICD-10-CM

## 2024-07-30 PROCEDURE — 92133 CPTRZD OPH DX IMG PST SGM ON: CPT | Mod: S$GLB,,, | Performed by: OPHTHALMOLOGY

## 2024-07-30 PROCEDURE — 99999 PR PBB SHADOW E&M-EST. PATIENT-LVL II: CPT | Mod: PBBFAC,,, | Performed by: OPHTHALMOLOGY

## 2024-07-30 PROCEDURE — 99214 OFFICE O/P EST MOD 30 MIN: CPT | Mod: S$GLB,,, | Performed by: OPHTHALMOLOGY

## 2024-07-30 PROCEDURE — 1160F RVW MEDS BY RX/DR IN RCRD: CPT | Mod: CPTII,S$GLB,, | Performed by: OPHTHALMOLOGY

## 2024-07-30 PROCEDURE — 1159F MED LIST DOCD IN RCRD: CPT | Mod: CPTII,S$GLB,, | Performed by: OPHTHALMOLOGY

## 2024-07-30 RX ORDER — DORZOLAMIDE HYDROCHLORIDE AND TIMOLOL MALEATE PRESERVATIVE FREE 20; 5 MG/ML; MG/ML
1 SOLUTION/ DROPS OPHTHALMIC 2 TIMES DAILY
Qty: 60 EACH | Refills: 3 | Status: SHIPPED | OUTPATIENT
Start: 2024-07-30

## 2024-07-30 NOTE — PROGRESS NOTES
HPI     Glaucoma            Comments: Pt here for lost to fu IOP check ( April 2024) and overall   glaucoma checkup.     Pt states va OU is stable  Pt taking Evoxac BID-TID PO  Pt using systane and refresh prn OU   Pt co slight irritation OS on and off but fine           Comments    1. Anatomic variant mac degen  2. Cobblestone OD  3. Dry eyes  K sensitivity test NO OU - 7/7/2023   Punctal plugs OU  4. H/O Lasik  5. H/O Iritis OS  6. COAG  7. RCE OD  8. SLT OU 6-11-20      **Simbrinza - dizzy, weak      GCL 24/31 - 07/07/23      PF Cosopt BID OU  Latanoprost QHS OU  Restasis PRN OU  Systane Hydration BID OU   Refresh Yan tears BID             Last edited by Eduardo Patel on 7/30/2024  2:41 PM.            Assessment /Plan     For exam results, see Encounter Report.      ICD-10-CM ICD-9-CM    1. Primary open angle glaucoma (POAG) of both eyes, mild stage  H40.1131 365.11 Posterior Segment OCT Optic Nerve- Both eyes    Doing well - intraocular pressure is within acceptable range relative to target pressure with no evidence of progression.   Continue current treatment.  Reviewed importance of continued compliance with treatment and follow up.      365.71       2. Keratitis sicca, bilateral  M35.01 370.8 Continue Restasis, Systane PRN          Return to clinic 6 months for IOP        PF Cosopt BID OU  Latanoprost QHS OU  Restasis PRN OU  Systane Hydration BID OU   Refresh Yan tears BID

## 2024-07-30 NOTE — TELEPHONE ENCOUNTER
Called and spoke with patient about the following:     Please arrive to [facility] at 5:30AM on 7/31/2024 for your scheduled procedure.  Address: [address] [place] [address] [place] [address] (2nd Building on left, 1st Floor Lobby)  >>>NO eating or drinking after midnight unless instructed otherwise by your Surgeon<<<    Thank you,  -[facility] Dept.  Mon-Fri 7:30 am - 4 pm [phone]

## 2024-07-31 ENCOUNTER — HOSPITAL ENCOUNTER (OUTPATIENT)
Facility: HOSPITAL | Age: 69
Discharge: HOME | End: 2024-07-31
Attending: UROLOGY | Admitting: UROLOGY
Payer: MEDICARE

## 2024-07-31 ENCOUNTER — ANESTHESIA (OUTPATIENT)
Dept: SURGERY | Facility: HOSPITAL | Age: 69
End: 2024-07-31
Payer: MEDICARE

## 2024-07-31 DIAGNOSIS — N39.3 STRESS INCONTINENCE: Primary | ICD-10-CM

## 2024-07-31 PROCEDURE — 25000003 PHARM REV CODE 250: Performed by: NURSE ANESTHETIST, CERTIFIED REGISTERED

## 2024-07-31 PROCEDURE — 71000015 HC POSTOP RECOV 1ST HR: Performed by: UROLOGY

## 2024-07-31 PROCEDURE — L8606 SYNTHETIC IMPLNT URINARY 1ML: HCPCS | Performed by: UROLOGY

## 2024-07-31 PROCEDURE — 36000707: Performed by: UROLOGY

## 2024-07-31 PROCEDURE — 37000009 HC ANESTHESIA EA ADD 15 MINS: Performed by: UROLOGY

## 2024-07-31 PROCEDURE — 36000706: Performed by: UROLOGY

## 2024-07-31 PROCEDURE — 63600175 PHARM REV CODE 636 W HCPCS: Performed by: NURSE ANESTHETIST, CERTIFIED REGISTERED

## 2024-07-31 PROCEDURE — 37000008 HC ANESTHESIA 1ST 15 MINUTES: Performed by: UROLOGY

## 2024-07-31 PROCEDURE — 71000033 HC RECOVERY, INTIAL HOUR: Performed by: UROLOGY

## 2024-07-31 PROCEDURE — 51715 ENDOSCOPIC INJECTION/IMPLANT: CPT | Mod: ,,, | Performed by: UROLOGY

## 2024-07-31 DEVICE — SYS BULKAMID URETH BULKING 1ML: Type: IMPLANTABLE DEVICE | Site: BLADDER | Status: FUNCTIONAL

## 2024-07-31 RX ORDER — OXYCODONE AND ACETAMINOPHEN 5; 325 MG/1; MG/1
1 TABLET ORAL
Status: DISCONTINUED | OUTPATIENT
Start: 2024-07-31 | End: 2024-07-31 | Stop reason: HOSPADM

## 2024-07-31 RX ORDER — ONDANSETRON HYDROCHLORIDE 2 MG/ML
4 INJECTION, SOLUTION INTRAVENOUS DAILY PRN
Status: DISCONTINUED | OUTPATIENT
Start: 2024-07-31 | End: 2024-07-31 | Stop reason: HOSPADM

## 2024-07-31 RX ORDER — GLUCAGON 1 MG
1 KIT INJECTION
OUTPATIENT
Start: 2024-07-31

## 2024-07-31 RX ORDER — ONDANSETRON HYDROCHLORIDE 2 MG/ML
4 INJECTION, SOLUTION INTRAVENOUS ONCE AS NEEDED
Status: DISCONTINUED | OUTPATIENT
Start: 2024-07-31 | End: 2024-07-31 | Stop reason: HOSPADM

## 2024-07-31 RX ORDER — PROPOFOL 10 MG/ML
VIAL (ML) INTRAVENOUS
Status: DISCONTINUED | OUTPATIENT
Start: 2024-07-31 | End: 2024-07-31

## 2024-07-31 RX ORDER — KETOROLAC TROMETHAMINE 30 MG/ML
15 INJECTION, SOLUTION INTRAMUSCULAR; INTRAVENOUS EVERY 8 HOURS PRN
OUTPATIENT
Start: 2024-07-31 | End: 2024-08-02

## 2024-07-31 RX ORDER — ONDANSETRON HYDROCHLORIDE 2 MG/ML
INJECTION, SOLUTION INTRAVENOUS
Status: DISCONTINUED | OUTPATIENT
Start: 2024-07-31 | End: 2024-07-31

## 2024-07-31 RX ORDER — CEFAZOLIN SODIUM 1 G/3ML
INJECTION, POWDER, FOR SOLUTION INTRAMUSCULAR; INTRAVENOUS
Status: DISCONTINUED | OUTPATIENT
Start: 2024-07-31 | End: 2024-07-31

## 2024-07-31 RX ORDER — DIPHENHYDRAMINE HYDROCHLORIDE 50 MG/ML
12.5 INJECTION INTRAMUSCULAR; INTRAVENOUS
OUTPATIENT
Start: 2024-07-31

## 2024-07-31 RX ORDER — OXYCODONE AND ACETAMINOPHEN 5; 325 MG/1; MG/1
1 TABLET ORAL
OUTPATIENT
Start: 2024-07-31

## 2024-07-31 RX ORDER — HYDROMORPHONE HYDROCHLORIDE 2 MG/ML
0.2 INJECTION, SOLUTION INTRAMUSCULAR; INTRAVENOUS; SUBCUTANEOUS EVERY 5 MIN PRN
OUTPATIENT
Start: 2024-07-31

## 2024-07-31 RX ORDER — MEPERIDINE HYDROCHLORIDE 25 MG/ML
12.5 INJECTION INTRAMUSCULAR; INTRAVENOUS; SUBCUTANEOUS ONCE AS NEEDED
Status: DISCONTINUED | OUTPATIENT
Start: 2024-07-31 | End: 2024-07-31 | Stop reason: HOSPADM

## 2024-07-31 RX ORDER — LIDOCAINE HYDROCHLORIDE 20 MG/ML
INJECTION INTRAVENOUS
Status: DISCONTINUED | OUTPATIENT
Start: 2024-07-31 | End: 2024-07-31

## 2024-07-31 RX ORDER — FENTANYL CITRATE 50 UG/ML
25 INJECTION, SOLUTION INTRAMUSCULAR; INTRAVENOUS EVERY 5 MIN PRN
Status: DISCONTINUED | OUTPATIENT
Start: 2024-07-31 | End: 2024-07-31 | Stop reason: HOSPADM

## 2024-07-31 RX ORDER — HYDROMORPHONE HYDROCHLORIDE 2 MG/ML
0.2 INJECTION, SOLUTION INTRAMUSCULAR; INTRAVENOUS; SUBCUTANEOUS EVERY 5 MIN PRN
Status: DISCONTINUED | OUTPATIENT
Start: 2024-07-31 | End: 2024-07-31 | Stop reason: HOSPADM

## 2024-07-31 RX ORDER — TRAMADOL HYDROCHLORIDE 50 MG/1
50 TABLET ORAL EVERY 6 HOURS PRN
Qty: 5 TABLET | Refills: 0 | Status: SHIPPED | OUTPATIENT
Start: 2024-07-31

## 2024-07-31 RX ORDER — ONDANSETRON HYDROCHLORIDE 2 MG/ML
4 INJECTION, SOLUTION INTRAVENOUS DAILY PRN
OUTPATIENT
Start: 2024-07-31

## 2024-07-31 RX ADMIN — PROPOFOL 150 MG: 10 INJECTION, EMULSION INTRAVENOUS at 07:07

## 2024-07-31 RX ADMIN — LIDOCAINE HYDROCHLORIDE 100 MG: 20 INJECTION INTRAVENOUS at 07:07

## 2024-07-31 RX ADMIN — CEFAZOLIN 2 G: 330 INJECTION, POWDER, FOR SOLUTION INTRAMUSCULAR; INTRAVENOUS at 07:07

## 2024-07-31 RX ADMIN — SODIUM CHLORIDE, POTASSIUM CHLORIDE, SODIUM LACTATE AND CALCIUM CHLORIDE: 600; 310; 30; 20 INJECTION, SOLUTION INTRAVENOUS at 06:07

## 2024-07-31 RX ADMIN — ONDANSETRON 4 MG: 2 INJECTION INTRAMUSCULAR; INTRAVENOUS at 07:07

## 2024-07-31 NOTE — DISCHARGE SUMMARY
[facility] - Surgery (Hospital)  Discharge Note  Short Stay    Procedure(s) (LRB):  CYSTOSCOPY, WITH PERIURETHRAL BULKING AGENT INJECTION (N/A)      OUTCOME: Patient tolerated treatment/procedure well without complication and is now ready for discharge.    DISPOSITION: Home or Self Care    FINAL DIAGNOSIS:  Stress incontinence    FOLLOWUP: In clinic    DISCHARGE INSTRUCTIONS:    Discharge Procedure Orders   Diet Adult Regular     Notify your health care provider if you experience any of the following:  temperature >100.4     Notify your health care provider if you experience any of the following:  persistent nausea and vomiting or diarrhea     Notify your health care provider if you experience any of the following:  severe uncontrolled pain     Notify your health care provider if you experience any of the following:  difficulty breathing or increased cough     Notify your health care provider if you experience any of the following:  severe persistent headache     Notify your health care provider if you experience any of the following:  worsening rash     Notify your health care provider if you experience any of the following:  persistent dizziness, light-headedness, or visual disturbances     Notify your health care provider if you experience any of the following:  increased confusion or weakness     Activity as tolerated

## 2024-07-31 NOTE — TRANSFER OF CARE
"Anesthesia Transfer of Care Note    Patient: Yolis Bourgeois    Procedure(s) Performed: Procedure(s) (LRB):  CYSTOSCOPY, WITH PERIURETHRAL BULKING AGENT INJECTION (N/A)    Patient location: PACU    Anesthesia Type: MAC    Transport from OR: Transported from OR on room air with adequate spontaneous ventilation    Post pain: adequate analgesia    Post assessment: no apparent anesthetic complications and tolerated procedure well    Post vital signs: stable    Level of consciousness: sedated    Nausea/Vomiting: no nausea/vomiting    Complications: none    Transfer of care protocol was followed      Last vitals: Visit Vitals  BP (!) 121/56 (BP Location: Right arm, Patient Position: Sitting)   Pulse 62   Temp 36.5 °C (97.7 °F) (Temporal)   Resp 18   Ht 5' 2.5" (1.588 m)   Wt 77.2 kg (170 lb 3.1 oz)   SpO2 99%   Breastfeeding No   BMI 30.63 kg/m²     "

## 2024-07-31 NOTE — OP NOTE
Date: 07/31/2024    Procedure: Cystoscopy w urethral bulking injections    Surgeon: Panfilo Light MD    Pre-op Diagnosis  Stress urinary incontinence    Post-op Diagnosis  Same      Estimated Blood Loss: 2cc    Drains: none    Complications: None    Specimens: No specimens collected during this procedure.    Implants: * No implants in log *    Disposition: PACU - hemodynamically stable. D/c home, f/u 2 weeks for symptom check.    Condition: stable    Indication for procedure:    68 y.o. female with h/o  stress urinary incontinence presents today for treatment with urethral bulking injections.     Procedure in detail:    Informed consent was obtained. The patient was premedicated and brought back to the operative suite. They were placed on the cystoscopy table in the supine position. Sequential compression devices were placed on her lower extremities bilaterally. The patient underwent anesthesia. They were then placed in the dorsal lithotomy position and all pressure points were padded. The genitalia was prepped and draped in the usual sterile fashion. A formal timeout was performed. I began the procedure by advancing the Bulkamid 0 degree scope with the sheath into the urethra. Systematic review was performed of the bladder revealing no stone, foreign body or tumor. Bilateral ureteral orifices were visualized and noted to be effluxing clear urine. The Bulkamid needle was then advanced into the rotatable sheath until the tip of the sheath was adjacent to the bladder neck. The sheath was then rotated to the 7 o'clock position. The needle was then extended into the bladder until the 2cm pino on the needle was visible. The Bulkamid system was then retracted until the tip of the needle was resting on the bladder neck. The needle was then retracted into the sheath and approximately 1.5cm from the bladder neck the Bulkamid system was pressed parallel against the urethral wall and the needle was advanced into the  submucosal tissue, ensuring that the bevel of the needle was facing towards the lumen. The needle was advanced approximately 0.5cm, and the Bulkamid hydrogel was then injected until the Bulkamid cushion was visible and reached the midline of the urethral lumen. The needle was then retracted back into the rotatable sheath and rotated to the next injection site. Subsequent injections were performed at 5 o'clock, 2 o'clock and 10 o'clock all along the same plane as the original injection until all 4 cushions met at the midline of the urethral lumen. 2mLs total of Bulkamid Hydrogel was used. The bladder was left full at the conclusion of the procedure. The patient tolerated the procedure well and was brought to PACU in good condition.    Panfilo Light MD

## 2024-07-31 NOTE — ANESTHESIA PREPROCEDURE EVALUATION
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    Primary open angle glaucoma (POAG) of both eyes, mild stage    Renal cyst    Class 1 obesity with body mass index (BMI) of 33.0 to 33.9 in adult    Essential hypertension     Past Medical History:   Diagnosis Date    Chronic constipation     Depression     Dry eyes     Endometriosis of broad ligament     Hypothyroidism     Macular degeneration     Migraine headache     Osteoarthritis of hand     PAF (paroxysmal atrial fibrillation)     Primary open angle glaucoma (POAG) of both eyes, mild stage 04/24/2018    Seasonal allergic rhinitis      Past Surgical History:   Procedure Laterality Date    ADENOIDECTOMY  1962    CERVICAL CONIZATION   W/ LASER      COLONOSCOPY N/A 08/18/2022    Procedure: COLONOSCOPY;  Surgeon: Seamus South MD;  Location: [facility];  Service: General;  Laterality: N/A;    DILATION AND CURETTAGE OF UTERUS      EYE SURGERY  July 2020    Glaucoma surgery    HYSTERECTOMY  03/2008    LASIK  2007    LIPOMA RESECTION  2010    right thigh    SLT - OU - BOTH EYES Bilateral 06/11/2020    TONSILLECTOMY  1962    TOTAL ABDOMINAL HYSTERECTOMY W/ BILATERAL SALPINGOOPHORECTOMY  03/2008    TUBAL LIGATION  1980       Chemistry        Component Value Date/Time     07/09/2024 0843    K 4.0 07/09/2024 0843     07/09/2024 0843    CO2 27 07/09/2024 0843    BUN 10 07/09/2024 0843    CREATININE 0.7 07/09/2024 0843    GLU 89 07/09/2024 0843        Component Value Date/Time    CALCIUM 9.3 07/09/2024 0843    ALKPHOS 67 07/09/2024 0843    AST 21  07/09/2024 0843    ALT 22 07/09/2024 0843    BILITOT 0.5 07/09/2024 0843    ESTGFRAFRICA >60.0 03/01/2022 0832    EGFRNONAA >60.0 03/01/2022 0832        Lab Results   Component Value Date    WBC 5.98 07/09/2024    HGB 13.7 07/09/2024    HCT 41.8 07/09/2024    MCV 96 07/09/2024     07/09/2024           Pre-op Assessment    I have reviewed the Patient Summary Reports.     I have reviewed the Nursing Notes. I have reviewed the NPO Status.   I have reviewed the Medications.     Review of Systems  Anesthesia Hx:  No problems with previous Anesthesia   History of prior surgery of interest to airway management or planning:  Previous anesthesia: General, MAC           Social:  Non-Smoker, Former Smoker, Social Alcohol Use       Hematology/Oncology:  Hematology Normal   Oncology Normal                                   Cardiovascular:     Hypertension              ECG has been reviewed. Sinus bradycardia (59)  Nonspecific T wave abnormality   Abnormal ECG   When compared with ECG of 13-OCT-2015 14:56,   Vent. rate has decreased BY  51 BPM   Nonspecific T wave abnormality now evident in Lateral leads   Confirmed by TY ONEAL MD ([id number]) on 7/9/2024 10:54:23 AM                            Pulmonary:  Pulmonary Normal                       Renal/:  Chronic Renal Disease   Renal cyst              Hepatic/GI:  Hepatic/GI Normal                 Musculoskeletal:  Arthritis               Neurological:      Headaches                                 Endocrine:   Hypothyroidism (acquired)  Bmi 30        Dermatological:  Skin Normal    Psych:   anxiety depression                Physical Exam  General: Well nourished, Cooperative, Alert and Oriented    Airway:  Mallampati: III / III  Mouth Opening: Small, but > 3cm  TM Distance: 4 - 6 cm  Tongue: Large  Neck ROM: Flexion Decreased, Extension Decreased    Dental:  Intact        Anesthesia Plan  Type of Anesthesia, risks & benefits discussed:    Anesthesia Type: Gen ETT, Gen  Supraglottic Airway, MAC  Intra-op Monitoring Plan: Standard ASA Monitors  Post Op Pain Control Plan: multimodal analgesia and IV/PO Opioids PRN  Induction:  IV  Airway Plan: Direct  Informed Consent: Informed consent signed with the Patient and all parties understand the risks and agree with anesthesia plan.  All questions answered.   ASA Score: 2  Day of Surgery Review of History & Physical: H&P Update referred to the surgeon/provider.I have interviewed and examined the patient. I have reviewed the patient's H&P dated: There are no significant changes. H&P completed by Anesthesiologist.    Ready For Surgery From Anesthesia Perspective.     .

## 2024-08-01 VITALS
RESPIRATION RATE: 18 BRPM | HEART RATE: 69 BPM | DIASTOLIC BLOOD PRESSURE: 59 MMHG | WEIGHT: 170.19 LBS | SYSTOLIC BLOOD PRESSURE: 117 MMHG | OXYGEN SATURATION: 98 % | HEIGHT: 63 IN | TEMPERATURE: 98 F | BODY MASS INDEX: 30.16 KG/M2

## 2024-08-02 ENCOUNTER — PATIENT MESSAGE (OUTPATIENT)
Dept: UROLOGY | Facility: CLINIC | Age: 69
End: 2024-08-02
Payer: MEDICARE

## 2024-08-02 NOTE — ANESTHESIA POSTPROCEDURE EVALUATION
Anesthesia Post Evaluation    Patient: Yolis Bourgeois    Procedure(s) Performed: Procedure(s) (LRB):  CYSTOSCOPY, WITH PERIURETHRAL BULKING AGENT INJECTION (N/A)    Final Anesthesia Type: general      Patient location during evaluation: PACU  Patient participation: Yes- Able to Participate  Level of consciousness: awake and alert, oriented and awake  Post-procedure vital signs: reviewed and stable  Pain management: adequate  Airway patency: patent    PONV status at discharge: No PONV  Anesthetic complications: no      Cardiovascular status: blood pressure returned to baseline  Respiratory status: unassisted  Hydration status: euvolemic  Follow-up not needed.              Vitals Value Taken Time   /59 07/31/24 0810   Temp 36.7 °C (98 °F) 07/31/24 0810   Pulse 69 07/31/24 0810   Resp 18 07/31/24 0810   SpO2 98 % 07/31/24 0810         Event Time   Out of Recovery 08:10:09         Pain/Kathy Score: No data recorded

## 2024-08-07 ENCOUNTER — HOSPITAL ENCOUNTER (OUTPATIENT)
Dept: RADIOLOGY | Facility: HOSPITAL | Age: 69
Discharge: HOME | End: 2024-08-07
Attending: UROLOGY
Payer: MEDICARE

## 2024-08-07 DIAGNOSIS — N28.89 OTHER SPECIFIED DISORDERS OF KIDNEY AND URETER: ICD-10-CM

## 2024-08-07 PROCEDURE — 74183 MRI ABD W/O CNTR FLWD CNTR: CPT | Mod: 26,,, | Performed by: RADIOLOGY

## 2024-08-07 PROCEDURE — 74183 MRI ABD W/O CNTR FLWD CNTR: CPT | Mod: TC,PN

## 2024-08-07 PROCEDURE — A9585 GADOBUTROL INJECTION: HCPCS | Mod: PN | Performed by: UROLOGY

## 2024-08-07 PROCEDURE — 25500020 PHARM REV CODE 255: Mod: PN | Performed by: UROLOGY

## 2024-08-07 RX ORDER — GADOBUTROL 604.72 MG/ML
7.5 INJECTION INTRAVENOUS
Status: COMPLETED | OUTPATIENT
Start: 2024-08-07 | End: 2024-08-07

## 2024-08-07 RX ADMIN — GADOBUTROL 7.5 ML: 604.72 INJECTION INTRAVENOUS at 08:08

## 2024-08-10 DIAGNOSIS — I10 ESSENTIAL HYPERTENSION: ICD-10-CM

## 2024-08-10 NOTE — TELEPHONE ENCOUNTER
No care due was identified.  [facility] Embedded Care Due Messages. Reference number: [id number].   8/10/2024 9:01:12 AM CDT

## 2024-08-10 NOTE — TELEPHONE ENCOUNTER
Care Due:                  Date            Visit Type   Department     Provider  --------------------------------------------------------------------------------                                MYCHART                              FOLLOWUP/OF  [facility]  Last Visit: 08-      [facility] VISIT   MEDICINE       Dana Johnson  Next Visit: None Scheduled  None         None Found                                                            Last  Test          Frequency    Reason                     Performed    Due Date  --------------------------------------------------------------------------------    Office Visit  15 months..  pantoprazole,              08- 11-                             valsartan-hydrochlorothia                             tyrell.....................    [employer] Embedded Care Due Messages. Reference number: [id number].   8/10/2024 8:56:30 AM CDT

## 2024-08-11 RX ORDER — PANTOPRAZOLE SODIUM 40 MG/1
TABLET, DELAYED RELEASE ORAL
Qty: 90 TABLET | Refills: 2 | OUTPATIENT
Start: 2024-08-11

## 2024-08-11 RX ORDER — VALSARTAN AND HYDROCHLOROTHIAZIDE 160; 12.5 MG/1; MG/1
1 TABLET, FILM COATED ORAL
Qty: 90 TABLET | Refills: 2 | OUTPATIENT
Start: 2024-08-11

## 2024-08-11 NOTE — TELEPHONE ENCOUNTER
Refill Decision Note   Yolis Bourgeois  is requesting a refill authorization.  Brief Assessment and Rationale for Refill:  Quick Discontinue     Medication Therapy Plan:  Duplicate      Comments:     Note composed:2:15 AM 08/11/2024

## 2024-08-12 RX ORDER — PANTOPRAZOLE SODIUM 40 MG/1
40 TABLET, DELAYED RELEASE ORAL DAILY
Qty: 90 TABLET | Refills: 0 | Status: SHIPPED | OUTPATIENT
Start: 2024-08-12

## 2024-08-12 RX ORDER — VALSARTAN AND HYDROCHLOROTHIAZIDE 160; 12.5 MG/1; MG/1
1 TABLET, FILM COATED ORAL DAILY
Qty: 90 TABLET | Refills: 0 | Status: SHIPPED | OUTPATIENT
Start: 2024-08-12

## 2024-08-19 PROBLEM — D68.69 OTHER THROMBOPHILIA: Status: ACTIVE | Noted: 2024-08-19

## 2024-08-19 NOTE — PROGRESS NOTES
"Yolis Bourgeois  MRN:  [id number]  68 y.o. female    SUBJECTIVE:     CHIEF COMPLAINT:  - Symptoms of anxiety  - Stress related to caregiver responsibilities    HPI:  Ms. Bourgeois reports persistent fatigue, extreme stress, muscle tension (particularly in neck), constant abdominal discomfort, headaches, migraines, and nausea, which she attributes to anxiety and mental stress. She also reports anticipatory anxiety about the following day and chronic back pain. The stress appears primarily related to caregiver responsibilities for her , who has health issues including atrial fibrillation and neuropathy. She finds it difficult to have uninterrupted time for herself, waking up at 5:00 AM to get 30 minutes of uninterrupted walking time. The heat has been depleting her energy lately. She typically sleeps for about 6 hours at night and takes a 45-minute nap every afternoon, rarely having trouble falling asleep. Regarding mental health, she has been on depression medication in the past (Paxil), which was not significantly effective. She emphasizes that she is not currently depressed but is having significant anxiety. To cope with stress, she plays word games on her phone to maintain cognitive activity. She finds it difficult to engage in self-care activities due to frequent interruptions. She reports feeling socially isolated as she has not been able to engage in normal activities and what she enjoys doing such as Anab[employer]ist, socializing with friends and sewing. They do have a vacation to [place] coming up next month which she is looking forward to. They are going with another couple and looking forward to interacting with friends and taking some time to relax.      Review of Systems   Constitutional:  Positive for malaise/fatigue.   Respiratory: Negative.     Cardiovascular: Negative.    Gastrointestinal:  Positive for abdominal pain ("in knots").   Musculoskeletal:  Positive for back pain, joint pain, myalgias and neck pain. " Negative for falls.   Neurological:  Positive for headaches. Negative for dizziness, tingling, tremors, seizures and loss of consciousness.   Psychiatric/Behavioral:  Negative for depression, hallucinations, memory loss, substance abuse and suicidal ideas. The patient is nervous/anxious and has insomnia (variable).        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    Primary open angle glaucoma (POAG) of both eyes, mild stage    Renal cyst    Class 1 obesity with body mass index (BMI) of 33.0 to 33.9 in adult    Essential hypertension    Stress incontinence    Other thrombophilia       Current Outpatient Medications   Medication Instructions    aspirin (ECOTRIN) 81 mg, Oral, Daily    butalbital-acetaminophen-caffeine -40 mg (FIORICET, ESGIC) -40 mg per tablet 1 tablet, Oral, Every 4-6 hours PRN    cevimeline (EVOXAC) 30 mg, Oral, 3 times daily    cycloSPORINE (RESTASIS) 0.05 % ophthalmic emulsion 1 drop, Both Eyes, 2 times daily    dorzolamide-timolol, PF, (COSOPT, PF,) 2-0.5 % Dpet ophthalmic solution 1 drop, Both Eyes, 2 times daily    finasteride (PROPECIA) 1 mg, Oral, Every other day    latanoprost 0.005 % ophthalmic solution 1 drop, Both Eyes, Nightly    metoprolol tartrate (LOPRESSOR) 12.5 mg, Oral, 2 times daily    pantoprazole (PROTONIX) 40 mg, Oral, Daily    thyroid, pork, (ARMOUR THYROID) 90 mg Tab 1 tablet, Oral, Before breakfast    traMADoL (ULTRAM) 50 mg, Oral, Every 6 hours PRN    valsartan-hydrochlorothiazide (DIOVAN-HCT) 160-12.5 mg per tablet 1 tablet, Oral, Daily         Past medical, surgical, family and  "social histories have been reviewed today.      OBJECTIVE:     Vitals:    08/20/24 0849   BP: 122/78   Pulse: 75   Resp: 18   Temp: 97 °F (36.1 °C)   TempSrc: Tympanic   SpO2: 96%   Weight: 76.6 kg (168 lb 14 oz)   Height: 5' 2" (1.575 m)       Physical Exam  Vitals reviewed.   Constitutional:       General: She is not in acute distress.  Neurological:      Mental Status: She is alert.   Psychiatric:         Attention and Perception: Attention and perception normal. She is attentive. She does not perceive auditory or visual hallucinations.         Mood and Affect: Affect is not blunt, flat, tearful or inappropriate.         Speech: Speech normal.         Behavior: Behavior is not slowed, withdrawn or combative. Behavior is cooperative.         Thought Content: Thought content normal. Thought content is not paranoid or delusional. Thought content does not include homicidal or suicidal ideation.         Cognition and Memory: Cognition is not impaired. Memory is not impaired.         Judgment: Judgment normal. Judgment is not impulsive or inappropriate.         ASSESSMENT:     1. Adjustment disorder with anxious mood ------ chronic issue, uncontrolled.  Trial of SSRI, discussed.  CBT if needed.  Coping skills, relaxation, regular exercise, meditation, me-time.  -     sertraline (ZOLOFT) 25 MG tablet; Take 1 tablet (25 mg total) by mouth once daily.  Dispense: 30 tablet; Refill: 6    2. Paroxysmal atrial fibrillation ----- chronic issue, followed by Cardiology with appt once a year.  On ASA and beta blocker as ordered.      PLAN:     Recheck in 1 month.  RTC as directed and/or prn.        FAHAD Blackburn  [facility]       30 minutes of total time spent on the encounter, which includes face to face time and non-face to face time preparing to see the patient.  This includes obtaining and/or reviewing separately obtained history, performing a medically appropriate examination and/or " evaluation, and counseling and educating the patient/family/caregiver.  Includes documenting clinical information in the electronic or other health record, independently interpreting results (not separately reported) and communicating results to the patient/family/caregiver, with care coordination (not separately reported).  Medications, tests and/or procedures ordered as necessary along with referring and communicating with other health professionals (when not separately reported).    This note was generated with the assistance of ambient listening technology. Verbal consent was obtained by the patient and accompanying visitor(s) for the recording of patient appointment to facilitate this note. I attest to having reviewed and edited the generated note for accuracy, though some syntax or spelling errors may persist. Please contact the author of this note for any clarification.

## 2024-08-20 ENCOUNTER — OFFICE VISIT (OUTPATIENT)
Dept: FAMILY MEDICINE | Facility: CLINIC | Age: 69
End: 2024-08-20
Payer: MEDICARE

## 2024-08-20 VITALS
HEART RATE: 75 BPM | BODY MASS INDEX: 31.08 KG/M2 | OXYGEN SATURATION: 96 % | WEIGHT: 168.88 LBS | SYSTOLIC BLOOD PRESSURE: 122 MMHG | HEIGHT: 62 IN | DIASTOLIC BLOOD PRESSURE: 78 MMHG | TEMPERATURE: 97 F | RESPIRATION RATE: 18 BRPM

## 2024-08-20 DIAGNOSIS — F43.22 ADJUSTMENT DISORDER WITH ANXIOUS MOOD: Primary | ICD-10-CM

## 2024-08-20 DIAGNOSIS — I48.0 PAROXYSMAL ATRIAL FIBRILLATION: ICD-10-CM

## 2024-08-20 PROBLEM — D68.69 OTHER THROMBOPHILIA: Status: RESOLVED | Noted: 2024-08-19 | Resolved: 2024-08-20

## 2024-08-20 PROCEDURE — 3008F BODY MASS INDEX DOCD: CPT | Mod: CPTII,S$GLB,, | Performed by: REGISTERED NURSE

## 2024-08-20 PROCEDURE — 3288F FALL RISK ASSESSMENT DOCD: CPT | Mod: CPTII,S$GLB,, | Performed by: REGISTERED NURSE

## 2024-08-20 PROCEDURE — G2211 COMPLEX E/M VISIT ADD ON: HCPCS | Mod: S$GLB,,, | Performed by: REGISTERED NURSE

## 2024-08-20 PROCEDURE — 99999 PR PBB SHADOW E&M-EST. PATIENT-LVL III: CPT | Mod: PBBFAC,,, | Performed by: REGISTERED NURSE

## 2024-08-20 PROCEDURE — 99214 OFFICE O/P EST MOD 30 MIN: CPT | Mod: S$GLB,,, | Performed by: REGISTERED NURSE

## 2024-08-20 PROCEDURE — 3074F SYST BP LT 130 MM HG: CPT | Mod: CPTII,S$GLB,, | Performed by: REGISTERED NURSE

## 2024-08-20 PROCEDURE — 3078F DIAST BP <80 MM HG: CPT | Mod: CPTII,S$GLB,, | Performed by: REGISTERED NURSE

## 2024-08-20 PROCEDURE — 1101F PT FALLS ASSESS-DOCD LE1/YR: CPT | Mod: CPTII,S$GLB,, | Performed by: REGISTERED NURSE

## 2024-08-20 PROCEDURE — 1125F AMNT PAIN NOTED PAIN PRSNT: CPT | Mod: CPTII,S$GLB,, | Performed by: REGISTERED NURSE

## 2024-08-20 RX ORDER — SERTRALINE HYDROCHLORIDE 25 MG/1
25 TABLET, FILM COATED ORAL DAILY
Qty: 30 TABLET | Refills: 6 | Status: SHIPPED | OUTPATIENT
Start: 2024-08-20 | End: 2025-08-20

## 2024-09-05 ENCOUNTER — OFFICE VISIT (OUTPATIENT)
Dept: UROLOGY | Facility: CLINIC | Age: 69
End: 2024-09-05
Payer: MEDICARE

## 2024-09-05 DIAGNOSIS — N28.1 RENAL CYST: Primary | ICD-10-CM

## 2024-09-05 NOTE — PROGRESS NOTES
The patient location is: [place]  The chief complaint leading to consultation is: renal cysts, DORY    Visit type: audiovisual    Face to Face time with patient: 3  6 minutes of total time spent on the encounter, which includes face to face time and non-face to face time preparing to see the patient (eg, review of tests), Obtaining and/or reviewing separately obtained history, Documenting clinical information in the electronic or other health record, Independently interpreting results (not separately reported) and communicating results to the patient/family/caregiver, or Care coordination (not separately reported).     Each patient to whom he or she provides medical services by telemedicine is:  (1) informed of the relationship between the physician and patient and the respective role of any other health care provider with respect to management of the patient; and (2) notified that he or she may decline to receive medical services by telemedicine and may withdraw from such care at any time.      Chief Complaint: Renal cysts    HPI:   09/05/2024 - patient returns today for follow-up and review of her MRI, this shows confirmed Bosniak two F cyst that is hemorrhagic, she also notes no issues since her bulking mid procedure, has been very pleased, no longer having to wear any pads as she is dry the vast majority of the time, no gross hematuria or dysuria    07/09/2024 - patient returns today for cystoscopy and pelvic exam, notes issues with stress incontinence, still wearing 2-3 panty liners per day, more bothered than she used to be and would like to proceed with a procedure    06/27/2024 - patient returns today for follow-up and review of her CT, this shows right lower pole Bosniak two F lesion with possible soft tissue component, no other suspicious or concerning findings, has thought about the bulkamid and wants to proceed with this    05/28/2024 - returns today for follow-up and review of her ultrasound, this shows  increase in size of her complex renal cyst with some increased complexity including a possible solid component, there is no Doppler flow, still has some stress incontinence, wears three panty liners per day, not overly bothersome to her, no gross hematuria or dysuria    05/26/2023 - patient returns today for follow-up, no new issues in the interim, renal ultrasound shows stable cysts, having some stress incontinence and wears three thin panty liners per day, previously saw pelvic floor physical therapy and found it helpful, she would like to go back and see them, denies any gross hematuria or UTIs    05/25/2022 - patient presents today for follow-up, no issues in the interim, renal ultrasound several days ago showed stable renal cysts, no voiding issues, no gross hematuria    5/21/20: Reviewed history in detail. Feeling fine.  DORY about the same.   Indep assessment of imaging agree with report:  Renal cyst same right kidney.  No time for PFMT her  is having severe complications of multiple maladies.  5/20/19: Reviewed history in detail. Feeling fine, no problems.  Cyst same as before smaller if anything.  Some DORY not needing a pad.  5/17/18: No problems in the interval. Bilateral cysts same.  5/11/17: 62 yo woman followed for 4 years for renal cysts.  Had a pyelonephritis on the way.  Has a 2.5 cm right Adal II cyst that looks stable over many years.    Allergies:  Climara [estradiol]    Medications: has a current medication list which includes the following prescription(s): aspirin, butalbital-acetaminophen-caffeine -40 mg, cevimeline, cyclosporine, dorzolamide-timolol (pf), finasteride, latanoprost, metoprolol tartrate, pantoprazole, sertraline, thyroid (pork), tramadol, and valsartan-hydrochlorothiazide.    Review of Systems:  General: No fever, chills  Skin: No rashes  Chest:  Denies cough and sputum production  Heart: Denies chest pain  Resp: Denies dyspnea  Abdomen: Denies diarrhea, abdominal  pain, hematemesis, or blood in stool.  Musculoskeletal: No joint stiffness or swelling. Denies back pain.  : see HPI  Neuro: no dizziness or weakness      PMH:   has a past medical history of Chronic constipation, Depression, Dry eyes, Endometriosis of broad ligament, Hypothyroidism, Macular degeneration, Migraine headache, Osteoarthritis of hand, PAF (paroxysmal atrial fibrillation), Primary open angle glaucoma (POAG) of both eyes, mild stage (04/24/2018), and Seasonal allergic rhinitis.    PSH:   has a past surgical history that includes Lipoma resection (2010); Total abdominal hysterectomy w/ bilateral salpingoophorectomy (03/2008); Cervical conization w/ laser; Dilation and curettage of uterus; LASIK (2007); Argon Trabeculoplasty - OU - Both Eyes (Bilateral, 06/11/2020); Colonoscopy (N/A, 08/18/2022); Adenoidectomy (1962); Eye surgery (July 2020); Tonsillectomy (1962); Tubal ligation (1980); Hysterectomy (03/2008); and Cystoscopy with injection of periurethral bulking agent (N/A, 7/31/2024).    FamHx: family history includes Arthritis in her father; Dementia in her mother; Depression in her mother; Diabetes in her father; Glaucoma in her maternal uncle; Heart disease in her father and mother; Heart failure in her father; Hypertension in her father and mother; No Known Problems in her brother; Vision loss in her mother.    SocHx:  reports that she quit smoking about 41 years ago. Her smoking use included cigarettes. She started smoking about 56 years ago. She has a 15 pack-year smoking history. She has never used smokeless tobacco. She reports current alcohol use. She reports that she does not use drugs.     Physical Exam:  General: awake, alert, cooperative  Head: NC/AT  Ears: external ears normal  Eyes: sclera normal  Lungs: normal inspiration, NAD  Heart: well-perfused  Abdomen: Soft, NT, ND  : External genitalia normal. No lesions. Meatus normal size and location. Urethra normal. + DORY with cough and  valsalva, No masses. Bladder normal. No fullness or masses. Vagina normal with scant discharge and no lesions. Anus/perineum normal. Uterus and adnexa without palpable abnormalities.  Skin: The skin is warm and dry.  Ext: No c/c/e.  Neuro: grossly intact, AOx3        Labs/Studies:   MRI ABDOMEN W WO CONTRAST     CLINICAL HISTORY:  Right renal mass.  Other specified disorders of kidney and ureter     TECHNIQUE:  Multiplanar multisequence imaging of the abdomen is performed with and without the intravenous administration of 7.5 cc of Gadavist.     COMPARISON:  CT abdomen, 05/30/2024.     FINDINGS:  In the mid to lower pole of the right lobe there is a T1 hyperintense, T2 hypointense, hemorrhagic cyst with a thin internal enhancing septation measuring 17 x 12 mm.  No solid enhancing renal mass in either kidney.  Benign Bosniak class 1 simple cyst measuring less than a cm in the posterior aspect of the midportion of the left kidney.     No hydronephrosis.  No retroperitoneal lymphadenopathy.  Normal adrenal glands.  Patent renal veins.     No pancreatic mass.  No biliary ductal dilatation.  No suspicious hepatic lesion.  No ascites.  No splenomegaly.     Impression:     1. Bosniak class 2 F hemorrhagic cyst in the mid to lower aspect of the right kidney.  Recommend six-month follow-up renal mass protocol MRI.  2. No retroperitoneal lymphadenopathy.    Lab Results   Component Value Date    WBC 5.98 07/09/2024    HGB 13.7 07/09/2024    HCT 41.8 07/09/2024     07/09/2024     07/09/2024    K 4.0 07/09/2024     07/09/2024    CREATININE 0.7 07/09/2024    BUN 10 07/09/2024    CO2 27 07/09/2024    TSH 0.047 (L) 08/15/2023    INR 1.0 10/13/2015    HGBA1C 5.4 12/29/2022    CHOL 154 08/15/2023    TRIG 98 08/15/2023    HDL 49 08/15/2023    ALT 22 07/09/2024    AST 21 07/09/2024     Procedure:  Diagnostic Cystoscopy    Indications:  LUTS    UA: normal, see lab results for values    Procedure in Detail: After  proper consents were obtained, the patient was prepped and draped in normal sterile fashion for diagnostic cystoscopy. 5 ml of lidocaine jelly was instilled in the urethra. The flexible cystoscope was then introduced into the urethra, and advanced into the bladder under direct vision. The urethral mucosa appeared normal, and no strictures were noted. The sphincter was normal. The bladder neck was normal. Inspection of the interior of the bladder was then carried out. The trigone was unremarkable, with no mucosal lesions. The ureteral orifices were normal in position and configuration. Systematic inspection of the mucosa of the bladder it was then carried out, rotating the cystoscope so that all areas of the left and right lateral walls, the dome of the bladder, and the posterior wall were all visualized. The cystoscope was then advanced further into the bladder, and maximum deflection of the scope was performed so that the bladder neck could be inspected. No mucosal lesions were noted there. The cystoscope was then removed, and the procedure terminated. Patient tolerated the procedure well. No complications.     Findings: normal bladder, no tumors    Impression/Plan:   DORY - s/p bulkamid, has been very pleased with her result, now just 1 ppd rarely, continue to monitor    Minimally complex renal cyst - Bosniak 2 F confrimed on MRI, f/u 6 months with CT RMP, if this is stable, then 12 months with GLORIA    Panfilo Light MD

## 2024-09-21 DIAGNOSIS — G43.909 MIGRAINE WITHOUT STATUS MIGRAINOSUS, NOT INTRACTABLE, UNSPECIFIED MIGRAINE TYPE: ICD-10-CM

## 2024-09-21 NOTE — TELEPHONE ENCOUNTER
No care due was identified.  [facility] Embedded Care Due Messages. Reference number: [id number].   9/21/2024 9:19:15 AM CDT

## 2024-09-23 RX ORDER — BUTALBITAL, ACETAMINOPHEN AND CAFFEINE 50; 325; 40 MG/1; MG/1; MG/1
TABLET ORAL
Qty: 30 TABLET | Refills: 0 | Status: SHIPPED | OUTPATIENT
Start: 2024-09-23

## 2024-10-16 DIAGNOSIS — H40.1131 PRIMARY OPEN ANGLE GLAUCOMA (POAG) OF BOTH EYES, MILD STAGE: ICD-10-CM

## 2024-10-16 RX ORDER — LATANOPROST 50 UG/ML
1 SOLUTION/ DROPS OPHTHALMIC NIGHTLY
Qty: 2.5 ML | Refills: 11 | Status: SHIPPED | OUTPATIENT
Start: 2024-10-16

## 2024-11-07 NOTE — TELEPHONE ENCOUNTER
Assessment/Plan:     The patient's clinical examination today significant for brittle, thickened and discolored pedal nail plates consistent with onychomycosis x 10.  There is lysis of 75% of the left hallucal nail plate with subungual debris.  There are no open lesions no signs of infection noted to either lower extremity.  Pedal pulses are palpable bilaterally.  Interdigital spaces are clear without maceration.  Vibratory and epicritic sensations are intact bilaterally.  Skin is dry without pruritus.    The pedal nail plates are sharply debrided with a sterile nail clipper x 10 without complication.  The nails then mechanically reduced in thickness and girth denies a rotary bur without incident.  His most recent hemoglobin A1c from September 2024 was 6.8, prior to that it was 6.2 in March 2023.    A diabetic foot examination was performed and the patient is deemed to be in a low risk category.  Recommend follow-up in 3 months.     Diagnoses and all orders for this visit:    Onychomycosis    Type 2 diabetes mellitus without complication, without long-term current use of insulin (HCC)  -     Ambulatory Referral to Podiatry    Encounter for diabetic foot exam (HCC)          Subjective:     Patient ID: Heath Schuster is a 69 y.o. male.    The patient presents today for his initial consultation with [facility] with a chief complaint of thickened and elongated pedal nail plates.  He does note a history of diabetes, having been diagnosed back in 2017.  He is currently on oral hypoglycemics.  He states that his blood sugars under good control.  He denies any numbness or tingling sensations to either foot.      PAST MEDICAL HISTORY:  Past Medical History:   Diagnosis Date    Appendicitis     Atrial fibrillation (HCC)     Colon polyp     Diabetes mellitus (HCC)     Hyperlipidemia     Hypertension     Shoulder pain, right 03/31/2023       PAST SURGICAL HISTORY:  Past Surgical History:   Procedure Laterality  Refill. I think this was sent to me by accident. Date    APPENDECTOMY      CHOLECYSTECTOMY      COLONOSCOPY          ALLERGIES:  Other    MEDICATIONS:  Current Outpatient Medications   Medication Sig Dispense Refill    apixaban (Eliquis) 5 mg Take 1 tablet (5 mg total) by mouth 2 (two) times a day 60 tablet 2    atorvastatin (LIPITOR) 20 mg tablet Take 20 mg by mouth daily      doxycycline monohydrate (MONODOX) 100 mg capsule Take 1 capsule (100 mg total) by mouth 2 (two) times a day for 10 days 20 capsule 0    lisinopril (ZESTRIL) 5 mg tablet TAKE ONE TABLET BY MOUTH EVERY DAY FOR BLOOD PRESSURE/HEART      metFORMIN (GLUCOPHAGE) 500 mg tablet Take 1,000 mg by mouth      metoprolol tartrate (LOPRESSOR) 25 mg tablet Take 0.5 tablets by mouth 2 (two) times a day      Multiple Vitamins-Minerals (CENTRUM ADULTS PO) Take 50 mg by mouth      omeprazole (PriLOSEC) 20 mg delayed release capsule        No current facility-administered medications for this visit.       SOCIAL HISTORY:  Social History     Socioeconomic History    Marital status:      Spouse name: None    Number of children: None    Years of education: None    Highest education level: None   Occupational History    None   Tobacco Use    Smoking status: Never     Passive exposure: Never    Smokeless tobacco: Never   Vaping Use    Vaping status: Never Used   Substance and Sexual Activity    Alcohol use: Not Currently    Drug use: Never    Sexual activity: Not Currently     Partners: Female   Other Topics Concern    None   Social History Narrative    None     Social Determinants of Health     Financial Resource Strain: Low Risk  (1/13/2023)    Overall Financial Resource Strain (CARDIA)     Difficulty of Paying Living Expenses: Not hard at all   Food Insecurity: No Food Insecurity (5/7/2024)    Nursing - Inadequate Food Risk Classification     Worried About Running Out of Food in the Last Year: Never true     Ran Out of Food in the Last Year: Never true     Ran Out of Food in the Last Year: Not on file    Transportation Needs: No Transportation Needs (5/7/2024)    PRAPARE - Transportation     Lack of Transportation (Medical): No     Lack of Transportation (Non-Medical): No   Physical Activity: Not on file   Stress: Not on file   Social Connections: Not on file   Intimate Partner Violence: Not on file   Housing Stability: Low Risk  (5/7/2024)    Housing Stability Vital Sign     Unable to Pay for Housing in the Last Year: No     Number of Times Moved in the Last Year: 1     Homeless in the Last Year: No        Review of Systems   Constitutional: Negative.    HENT: Negative.     Eyes: Negative.    Respiratory: Negative.     Cardiovascular: Negative.    Endocrine: Negative.    Musculoskeletal: Negative.    Neurological: Negative.    Hematological: Negative.    Psychiatric/Behavioral: Negative.           Objective:     Physical Exam  Vitals reviewed.   Constitutional:       Appearance: Normal appearance.   HENT:      Head: Normocephalic and atraumatic.      Nose: Nose normal.   Eyes:      Conjunctiva/sclera: Conjunctivae normal.      Pupils: Pupils are equal, round, and reactive to light.   Cardiovascular:      Pulses: no weak pulses.           Dorsalis pedis pulses are 2+ on the right side and 2+ on the left side.        Posterior tibial pulses are 2+ on the right side and 2+ on the left side.   Pulmonary:      Effort: Pulmonary effort is normal.   Feet:      Right foot:      Skin integrity: No ulcer, skin breakdown, erythema, warmth, callus or dry skin.      Left foot:      Skin integrity: No ulcer, skin breakdown, erythema, warmth, callus or dry skin.   Skin:     General: Skin is warm.      Capillary Refill: Capillary refill takes less than 2 seconds.   Neurological:      General: No focal deficit present.      Mental Status: He is alert and oriented to person, place, and time.   Psychiatric:         Mood and Affect: Mood normal.         Behavior: Behavior normal.         Thought Content: Thought content normal.          Diabetic Foot Exam    Patient's shoes and socks removed.    Right Foot/Ankle   Right Foot Inspection  Skin Exam: skin normal and skin intact. No dry skin, no warmth, no callus, no erythema, no maceration, no abnormal color, no pre-ulcer, no ulcer and no callus.     Toe Exam: ROM and strength within normal limits.     Sensory   Vibration: intact  Monofilament testing: intact    Vascular  Capillary refills: < 3 seconds  The right DP pulse is 2+. The right PT pulse is 2+.     Left Foot/Ankle  Left Foot Inspection  Skin Exam: skin normal and skin intact. No dry skin, no warmth, no erythema, no maceration, normal color, no pre-ulcer, no ulcer and no callus.     Toe Exam: ROM and strength within normal limits.     Sensory   Vibration: intact  Monofilament testing: intact    Vascular  Capillary refills: < 3 seconds  The left DP pulse is 2+. The left PT pulse is 2+.     Assign Risk Category  No deformity present  No loss of protective sensation  No weak pulses  Risk: 0

## 2024-11-12 ENCOUNTER — OFFICE VISIT (OUTPATIENT)
Dept: FAMILY MEDICINE | Facility: CLINIC | Age: 69
End: 2024-11-12
Payer: MEDICARE

## 2024-11-12 VITALS
WEIGHT: 175.81 LBS | BODY MASS INDEX: 32.35 KG/M2 | SYSTOLIC BLOOD PRESSURE: 130 MMHG | HEIGHT: 62 IN | HEART RATE: 67 BPM | DIASTOLIC BLOOD PRESSURE: 80 MMHG | TEMPERATURE: 97 F | OXYGEN SATURATION: 97 %

## 2024-11-12 DIAGNOSIS — I48.0 PAROXYSMAL ATRIAL FIBRILLATION: Chronic | ICD-10-CM

## 2024-11-12 DIAGNOSIS — E66.811 OBESITY (BMI 30.0-34.9): ICD-10-CM

## 2024-11-12 DIAGNOSIS — Z23 NEED FOR VACCINATION: ICD-10-CM

## 2024-11-12 DIAGNOSIS — R00.2 PALPITATIONS: ICD-10-CM

## 2024-11-12 DIAGNOSIS — Z00.00 PREVENTATIVE HEALTH CARE: Primary | ICD-10-CM

## 2024-11-12 DIAGNOSIS — F43.22 ADJUSTMENT DISORDER WITH ANXIOUS MOOD: ICD-10-CM

## 2024-11-12 DIAGNOSIS — E03.9 ACQUIRED HYPOTHYROIDISM: Chronic | ICD-10-CM

## 2024-11-12 DIAGNOSIS — I10 ESSENTIAL HYPERTENSION: Chronic | ICD-10-CM

## 2024-11-12 PROCEDURE — 1160F RVW MEDS BY RX/DR IN RCRD: CPT | Mod: CPTII,S$GLB,, | Performed by: FAMILY MEDICINE

## 2024-11-12 PROCEDURE — 99999 PR PBB SHADOW E&M-EST. PATIENT-LVL IV: CPT | Mod: PBBFAC,,, | Performed by: FAMILY MEDICINE

## 2024-11-12 PROCEDURE — G0008 ADMIN INFLUENZA VIRUS VAC: HCPCS | Mod: S$GLB,,, | Performed by: FAMILY MEDICINE

## 2024-11-12 PROCEDURE — 90653 IIV ADJUVANT VACCINE IM: CPT | Mod: S$GLB,,, | Performed by: FAMILY MEDICINE

## 2024-11-12 PROCEDURE — 99397 PER PM REEVAL EST PAT 65+ YR: CPT | Mod: 25,S$GLB,, | Performed by: FAMILY MEDICINE

## 2024-11-12 PROCEDURE — 3075F SYST BP GE 130 - 139MM HG: CPT | Mod: CPTII,S$GLB,, | Performed by: FAMILY MEDICINE

## 2024-11-12 PROCEDURE — 3008F BODY MASS INDEX DOCD: CPT | Mod: CPTII,S$GLB,, | Performed by: FAMILY MEDICINE

## 2024-11-12 PROCEDURE — 1159F MED LIST DOCD IN RCRD: CPT | Mod: CPTII,S$GLB,, | Performed by: FAMILY MEDICINE

## 2024-11-12 PROCEDURE — 3079F DIAST BP 80-89 MM HG: CPT | Mod: CPTII,S$GLB,, | Performed by: FAMILY MEDICINE

## 2024-11-12 PROCEDURE — 1126F AMNT PAIN NOTED NONE PRSNT: CPT | Mod: CPTII,S$GLB,, | Performed by: FAMILY MEDICINE

## 2024-11-12 RX ORDER — VALSARTAN AND HYDROCHLOROTHIAZIDE 160; 12.5 MG/1; MG/1
1 TABLET, FILM COATED ORAL DAILY
Qty: 90 TABLET | Refills: 3 | Status: SHIPPED | OUTPATIENT
Start: 2024-11-12

## 2024-11-12 RX ORDER — SERTRALINE HYDROCHLORIDE 50 MG/1
50 TABLET, FILM COATED ORAL DAILY
Qty: 90 TABLET | Refills: 3 | Status: SHIPPED | OUTPATIENT
Start: 2024-11-12 | End: 2025-11-12

## 2024-11-12 NOTE — PROGRESS NOTES
CHIEF COMPLAINT: This is a 69-year-old female here for preventive health exam.    SUBJECTIVE:  Patient is doing well without complaints. She recently had bulking agent injected into periurethral area with improvement in incontinence. She has situational stress related to her 's chronic medical conditions. She is taking sertraline 25 mg daily for adjustment with anxiety.  She takes valsartan -12.5 mg daily for essential hypertension. Her BP today is 130/80. She is followed by Cardiology for palpitations for which she takes metoprolol 12.5 mg twice daily. She is taking pork thyroid for hypothyroidism per OB-GYN and finasteride for hair loss.  She takes pantoprazole for GERD as needed. The patient has migraine headaches and takes Fioricet as needed.      Eye exam July 2024.  Mammogram April 2024.  Colonoscopy 2022 due again in 2032.  Tdap June 2013.  Flu vaccine September 2020.     ROS:  GENERAL: Patient denies fever, chills, night sweats.  Patient denies weight loss. Patient denies anorexia, fatigue, weakness or swollen glands.  SKIN: Patient denies rash or hair loss.  HEENT: Patient denies sore throat, ear pain, hearing loss, nasal congestion, or runny nose. Patient denies visual disturbance, eye irritation or discharge.  LUNGS: Patient denies cough, wheeze or hemoptysis.  CARDIOVASCULAR: Patient denies chest pain, shortness of breath, palpitations, syncope or lower extremity edema.  GI: Patient denies abdominal pain, nausea, vomiting, diarrhea, constipation, blood in stool or melena.  GENITOURINARY: Patient denies pelvic pain, vaginal discharge, itch or odor. Patient denies irregular vaginal bleeding.  Patient denies dysuria, frequency, hematuria, nocturia, urgency or incontinence.  BREASTS: Patient denies breast pain, mass or nipple discharge.  MUSCULOSKELETAL: Patient denies joint swelling, redness or warmth.  NEUROLOGIC: Patient denies headache, vertigo, paresthesias, weakness in limb, dysarthria,  dysphagia or abnormality of gait.  PSYCHIATRIC: Patient denies memory loss. Positive for anxiety and dysphoric mood.      OBJECTIVE:   GENERAL: Well-developed well-nourished overweight white female alert and oriented x3, in no acute distress.  Memory, judgment and cognition without deficit.   SKIN: Clear without rash.  Normal color and tone.  HEENT: Eyes: Clear conjunctivae. No scleral icterus.  Pupils equal reactive to light and accommodation.  Ears: Clear canals. Clear TMs.  Nose: Without congestion.  Pharynx: Without injection or exudates.  NECK: Supple, normal range of motion.  No masses, lymphadenopathy or enlarged thyroid.  No JVD.  Carotids 2+ and equal.  No bruits.  LUNGS: Clear to auscultation.  Normal respiratory effort.  CARDIOVASCULAR:  Regular rhythm, normal S1, S2 without murmur, gallop or rub.  BACK:  No CVA or spinal tenderness.  ABDOMEN: Normal appearance.  Active bowel sounds.  Soft, nontender without mass or organomegaly.  No rebound or guarding.  EXTREMITIES: Without cyanosis, clubbing or edema.  Distal pulses 2+ and equal.  Normal range of motion in all extremities.  No joint effusion, erythema or warmth.  NEUROLOGIC:   Cranial nerves II through XII without deficit.  Motor strength equal bilaterally.  Sensation normal to touch.  Deep tendon reflexes 2+ and equal.  Gait without abnormality.  No tremor.  Negative cerebellar signs.  PELVIC: Deferred.    ASSESSMENT:  1. Preventative health care    2. Essential hypertension    3. Paroxysmal atrial fibrillation    4. Acquired hypothyroidism    5. Adjustment disorder with anxious mood    6. Obesity (BMI 30.0-34.9)      PLAN:  1. Weight reduction. Exercise regularly.  2. Age-appropriate counseling.  3. Fasting lab.  4. Influenza vaccine.    5. Refill valsartan HCT.    6. Increase sertraline to 50 mg daily.  7. Follow-up in 6-12 months.    30 minutes of total time spent on the encounter, which includes face to face time and non-face to face time  preparing to see the patient (eg, review of tests), Obtaining and/or reviewing separately obtained history, Documenting clinical information in the electronic or other health record, Independently interpreting results (not separately reported) and communicating results to the patient/family/caregiver, or Care coordination (not separately reported).     This note is generated with speech recognition software and is subject to transcription error and sound alike phrases that may be missed by proofreading.

## 2024-12-10 NOTE — PROGRESS NOTES
"Yolis Bourgeois  MRN:  [id number]  69 y.o. female      SUBJECTIVE:     CHIEF COMPLAINT:  - Sinus infection    HPI:  Ms. Bourgeois reports a sinus infection that began the day after Thanksgiving. She had a low-grade fever, with her normal temperature of about 97 increasing to 98.5, and once reaching 99. The fever was accompanied by sweating and chills. Ms. Bourgeois describes a raw throat, congestion, runny nose, post-nasal drip, and ear congestion. She has a cough that feels productive but is not expectorating mucus, followed by nasal congestion. Ms. Bourgeois denies any bloody discharge.    Ms. Bourgeois has tried several treatments, including Singulair from a previous prescription (4-5 pills) and cough syrup. These medications initially provided relief for a few days before becoming ineffective, a pattern consistent throughout her illness.      Review of Systems   Constitutional:  Positive for chills, diaphoresis and fever (low grade). Negative for weight loss.   HENT:  Positive for congestion (w/ RN and PND), ear pain (congestion), sinus pain and sore throat (describes as "raw throat").    Eyes: Negative.    Respiratory:  Positive for cough and sputum production. Negative for hemoptysis, shortness of breath and wheezing.    Cardiovascular: Negative.  Negative for chest pain, palpitations and leg swelling.   Musculoskeletal:  Negative for myalgias.   Skin:  Negative for rash.   Neurological:  Positive for headaches. Negative for dizziness, tingling and tremors.   Endo/Heme/Allergies:  Negative for environmental allergies.         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    Primary " "open angle glaucoma (POAG) of both eyes, mild stage    Renal cyst    Essential hypertension    Stress incontinence    Obesity (BMI 30.0-34.9)    Palpitations       Current Outpatient Medications   Medication Instructions    aspirin (ECOTRIN) 81 mg, Daily    butalbital-acetaminophen-caffeine -40 mg (FIORICET, ESGIC) -40 mg per tablet TAKE 1 TABLET BY MOUTH EVERY 4-6 HOURS AS NEEDED FOR HEADACHES    cevimeline (EVOXAC) 30 mg, Oral, 3 times daily    cycloSPORINE (RESTASIS) 0.05 % ophthalmic emulsion 1 drop, Both Eyes, 2 times daily    dorzolamide-timolol, PF, (COSOPT, PF,) 2-0.5 % Dpet ophthalmic solution 1 drop, Both Eyes, 2 times daily    finasteride (PROPECIA) 1 mg, Every other day    latanoprost 0.005 % ophthalmic solution 1 drop, Both Eyes, Nightly    metoprolol tartrate (LOPRESSOR) 12.5 mg, 2 times daily    pantoprazole (PROTONIX) 40 mg, Oral, Daily    sertraline (ZOLOFT) 50 mg, Oral, Daily    thyroid, pork, (ARMOUR THYROID) 90 mg Tab 1 tablet, Before breakfast    valsartan-hydrochlorothiazide (DIOVAN-HCT) 160-12.5 mg per tablet 1 tablet, Oral, Daily         Past medical, surgical, family and social histories have been reviewed today.      OBJECTIVE:     Vitals:    12/11/24 1024   BP: 104/62   Pulse: 83   Resp: 18   Temp: 97 °F (36.1 °C)   TempSrc: Tympanic   SpO2: 97%   Weight: 83.3 kg (183 lb 10.3 oz)   Height: 5' 2" (1.575 m)     Vitals:    12/11/24 1024   PainSc: 0-No pain       Physical Exam  Vitals reviewed.   HENT:      Head: Normocephalic and atraumatic.      Right Ear: No middle ear effusion. Tympanic membrane is bulging. Tympanic membrane is not injected, perforated, erythematous or retracted.      Left Ear:  No middle ear effusion. Tympanic membrane is bulging. Tympanic membrane is not injected, perforated, erythematous or retracted.      Nose: Mucosal edema and congestion present. No rhinorrhea.      Right Sinus: Frontal sinus tenderness present. No maxillary sinus tenderness.      Left " Sinus: Frontal sinus tenderness present. No maxillary sinus tenderness.      Mouth/Throat:      Mouth: Mucous membranes are moist.      Pharynx: Postnasal drip present. No pharyngeal swelling or posterior oropharyngeal erythema.   Eyes:      Conjunctiva/sclera: Conjunctivae normal.      Pupils: Pupils are equal, round, and reactive to light.   Cardiovascular:      Rate and Rhythm: Normal rate and regular rhythm.   Pulmonary:      Effort: Pulmonary effort is normal.      Breath sounds: Normal breath sounds.   Lymphadenopathy:      Cervical: No cervical adenopathy.   Skin:     Capillary Refill: Capillary refill takes less than 2 seconds.   Neurological:      Mental Status: She is alert and oriented to person, place, and time.      Motor: No weakness.      Gait: Gait normal.   Psychiatric:         Mood and Affect: Mood normal.         Behavior: Behavior normal.         Thought Content: Thought content normal.         Judgment: Judgment normal.         ASSESSMENT:     1. Sinusitis, unspecified chronicity, unspecified location  -     amoxicillin-clavulanate 875-125mg (AUGMENTIN) 875-125 mg per tablet; Take 1 tablet by mouth 2 (two) times daily. for 7 days  Dispense: 14 tablet; Refill: 0    2. Cough, unspecified type  -     promethazine-dextromethorphan (PROMETHAZINE-DM) 6.25-15 mg/5 mL Syrp; Take 5 mLs by mouth every 4 to 6 hours as needed (cough).  Dispense: 118 mL; Refill: 0      PLAN:     Symptomatic care, rest, hydration, rx as ordered.  Cautioned on sedating effects of cough med.  Monitor.  Contact office back if s/s worsen or linger.  RTC as directed and/or prn.      FAHAD Blackburn  [facility]       25 minutes of total time spent on the encounter, which includes face to face time and non-face to face time preparing to see the patient.  This includes obtaining and/or reviewing separately obtained history, performing a medically appropriate examination and/or evaluation, and  counseling and educating the patient/family/caregiver.  Includes documenting clinical information in the electronic or other health record, independently interpreting results (not separately reported) and communicating results to the patient/family/caregiver, with care coordination (not separately reported).  Medications, tests and/or procedures ordered as necessary along with referring and communicating with other health professionals (when not separately reported).

## 2024-12-11 ENCOUNTER — OFFICE VISIT (OUTPATIENT)
Dept: FAMILY MEDICINE | Facility: CLINIC | Age: 69
End: 2024-12-11
Payer: MEDICARE

## 2024-12-11 VITALS
DIASTOLIC BLOOD PRESSURE: 62 MMHG | WEIGHT: 183.63 LBS | BODY MASS INDEX: 33.79 KG/M2 | OXYGEN SATURATION: 97 % | TEMPERATURE: 97 F | HEART RATE: 83 BPM | HEIGHT: 62 IN | RESPIRATION RATE: 18 BRPM | SYSTOLIC BLOOD PRESSURE: 104 MMHG

## 2024-12-11 DIAGNOSIS — R05.9 COUGH, UNSPECIFIED TYPE: ICD-10-CM

## 2024-12-11 DIAGNOSIS — J32.9 SINUSITIS, UNSPECIFIED CHRONICITY, UNSPECIFIED LOCATION: Primary | ICD-10-CM

## 2024-12-11 PROCEDURE — 1101F PT FALLS ASSESS-DOCD LE1/YR: CPT | Mod: CPTII,S$GLB,, | Performed by: REGISTERED NURSE

## 2024-12-11 PROCEDURE — 1126F AMNT PAIN NOTED NONE PRSNT: CPT | Mod: CPTII,S$GLB,, | Performed by: REGISTERED NURSE

## 2024-12-11 PROCEDURE — 99213 OFFICE O/P EST LOW 20 MIN: CPT | Mod: S$GLB,,, | Performed by: REGISTERED NURSE

## 2024-12-11 PROCEDURE — 3074F SYST BP LT 130 MM HG: CPT | Mod: CPTII,S$GLB,, | Performed by: REGISTERED NURSE

## 2024-12-11 PROCEDURE — 99999 PR PBB SHADOW E&M-EST. PATIENT-LVL III: CPT | Mod: PBBFAC,,, | Performed by: REGISTERED NURSE

## 2024-12-11 PROCEDURE — 3288F FALL RISK ASSESSMENT DOCD: CPT | Mod: CPTII,S$GLB,, | Performed by: REGISTERED NURSE

## 2024-12-11 PROCEDURE — 3078F DIAST BP <80 MM HG: CPT | Mod: CPTII,S$GLB,, | Performed by: REGISTERED NURSE

## 2024-12-11 PROCEDURE — 3008F BODY MASS INDEX DOCD: CPT | Mod: CPTII,S$GLB,, | Performed by: REGISTERED NURSE

## 2024-12-11 RX ORDER — PROMETHAZINE HYDROCHLORIDE AND DEXTROMETHORPHAN HYDROBROMIDE 6.25; 15 MG/5ML; MG/5ML
5 SYRUP ORAL
Qty: 118 ML | Refills: 0 | Status: SHIPPED | OUTPATIENT
Start: 2024-12-11

## 2024-12-11 RX ORDER — AMOXICILLIN AND CLAVULANATE POTASSIUM 875; 125 MG/1; MG/1
1 TABLET, FILM COATED ORAL 2 TIMES DAILY
Qty: 14 TABLET | Refills: 0 | Status: SHIPPED | OUTPATIENT
Start: 2024-12-11 | End: 2024-12-18

## 2024-12-12 ENCOUNTER — LAB VISIT (OUTPATIENT)
Dept: LAB | Facility: HOSPITAL | Age: 69
End: 2024-12-12
Attending: FAMILY MEDICINE
Payer: MEDICARE

## 2024-12-12 DIAGNOSIS — I10 ESSENTIAL HYPERTENSION: Chronic | ICD-10-CM

## 2024-12-12 DIAGNOSIS — E03.9 ACQUIRED HYPOTHYROIDISM: Chronic | ICD-10-CM

## 2024-12-12 LAB
CHOLEST SERPL-MCNC: 212 MG/DL (ref 120–199)
CHOLEST/HDLC SERPL: 4 {RATIO} (ref 2–5)
HDLC SERPL-MCNC: 53 MG/DL (ref 40–75)
HDLC SERPL: 25 % (ref 20–50)
LDLC SERPL CALC-MCNC: 130 MG/DL (ref 63–159)
NONHDLC SERPL-MCNC: 159 MG/DL
TRIGL SERPL-MCNC: 145 MG/DL (ref 30–150)
TSH SERPL DL<=0.005 MIU/L-ACNC: 0.46 UIU/ML (ref 0.4–4)

## 2024-12-12 PROCEDURE — 84443 ASSAY THYROID STIM HORMONE: CPT | Performed by: FAMILY MEDICINE

## 2024-12-12 PROCEDURE — 80061 LIPID PANEL: CPT | Performed by: FAMILY MEDICINE

## 2024-12-12 PROCEDURE — 36415 COLL VENOUS BLD VENIPUNCTURE: CPT | Mod: PO | Performed by: FAMILY MEDICINE

## 2024-12-26 DIAGNOSIS — H40.1131 PRIMARY OPEN ANGLE GLAUCOMA (POAG) OF BOTH EYES, MILD STAGE: ICD-10-CM

## 2024-12-26 DIAGNOSIS — H16.223 KERATITIS SICCA, BILATERAL: ICD-10-CM

## 2024-12-26 RX ORDER — DORZOLAMIDE HYDROCHLORIDE AND TIMOLOL MALEATE PRESERVATIVE FREE 20; 5 MG/ML; MG/ML
1 SOLUTION/ DROPS OPHTHALMIC 2 TIMES DAILY
Qty: 60 EACH | Refills: 3 | Status: SHIPPED | OUTPATIENT
Start: 2024-12-26

## 2025-01-07 ENCOUNTER — PATIENT MESSAGE (OUTPATIENT)
Dept: OPHTHALMOLOGY | Facility: CLINIC | Age: 70
End: 2025-01-07
Payer: MEDICARE

## 2025-02-04 ENCOUNTER — OFFICE VISIT (OUTPATIENT)
Dept: OPHTHALMOLOGY | Facility: CLINIC | Age: 70
End: 2025-02-04
Payer: MEDICARE

## 2025-02-04 DIAGNOSIS — H40.1131 PRIMARY OPEN ANGLE GLAUCOMA (POAG) OF BOTH EYES, MILD STAGE: Primary | ICD-10-CM

## 2025-02-04 DIAGNOSIS — Z98.890 HISTORY OF REFRACTIVE SURGERY: ICD-10-CM

## 2025-02-04 DIAGNOSIS — H16.223 KERATITIS SICCA, BILATERAL: ICD-10-CM

## 2025-02-04 DIAGNOSIS — H18.832 RECURRENT CORNEAL EROSION, LEFT: ICD-10-CM

## 2025-02-04 PROCEDURE — G2211 COMPLEX E/M VISIT ADD ON: HCPCS | Mod: S$GLB,,, | Performed by: OPHTHALMOLOGY

## 2025-02-04 PROCEDURE — 1159F MED LIST DOCD IN RCRD: CPT | Mod: CPTII,S$GLB,, | Performed by: OPHTHALMOLOGY

## 2025-02-04 PROCEDURE — 1160F RVW MEDS BY RX/DR IN RCRD: CPT | Mod: CPTII,S$GLB,, | Performed by: OPHTHALMOLOGY

## 2025-02-04 PROCEDURE — 99214 OFFICE O/P EST MOD 30 MIN: CPT | Mod: S$GLB,,, | Performed by: OPHTHALMOLOGY

## 2025-02-04 PROCEDURE — 99999 PR PBB SHADOW E&M-EST. PATIENT-LVL III: CPT | Mod: PBBFAC,,, | Performed by: OPHTHALMOLOGY

## 2025-02-04 NOTE — PROGRESS NOTES
HPI     Glaucoma            Comments: Pt here for 6 mo     Pt using:  Moisture gtts prn OU   Evoxac BID PO     PF Cosopt bid ou   Latanoprost qhs OU           Comments    1. Anatomic variant mac degen  2. Cobblestone OD  3. Dry eyes  K sensitivity test NO OU - 7/7/2023   Punctal plugs OU  4. H/O Lasik  5. H/O Iritis OS  6. COAG  7. RCE OD  8. SLT OU 6-11-20      **Simbrinza - dizzy, weak      GCL 24/31 - 07/07/23      PF Cosopt BID OU  Latanoprost QHS OU  Restasis PRN OU  Systane Hydration BID OU   Refresh Yan tears BID             Last edited by Eduardo Patel on 2/4/2025  9:35 AM.            Assessment /Plan     For exam results, see Encounter Report.      ICD-10-CM ICD-9-CM    1. Primary open angle glaucoma (POAG) of both eyes, mild stage  H40.1131 365.11 Doing well - intraocular pressure is within acceptable range relative to target pressure with no evidence of progression.   Continue current treatment.  Reviewed importance of continued compliance with treatment and follow up.     Visit today included increased complexity associated with the care and management of glaucoma and dry eye . Patient aware that management of medical condition requires long term follow up.  Written instructions were placed in the portal for the patient upon closure of the encounter regarding specific use of the required medications.         365.71       2. Keratitis sicca, bilateral  H16.223 370.33 Stable on restasis       3. Recurrent corneal erosion, left  H18.832 371.42 H/o - follow       4. History of refractive surgery  Z98.890 V45.69 H/o lasik             PF Cosopt BID OU  Latanoprost QHS OU  Restasis PRN OU  Systane Hydration BID OU   Refresh Yan tears BID     Return to clinic 4 months DOA, goct and hvf

## 2025-02-28 ENCOUNTER — LAB VISIT (OUTPATIENT)
Dept: LAB | Facility: HOSPITAL | Age: 70
End: 2025-02-28
Attending: UROLOGY
Payer: MEDICARE

## 2025-02-28 DIAGNOSIS — N28.1 RENAL CYST: ICD-10-CM

## 2025-02-28 LAB
CREAT SERPL-MCNC: 0.9 MG/DL (ref 0.5–1.4)
EST. GFR  (NO RACE VARIABLE): >60 ML/MIN/1.73 M^2

## 2025-02-28 PROCEDURE — 82565 ASSAY OF CREATININE: CPT | Performed by: UROLOGY

## 2025-02-28 PROCEDURE — 36415 COLL VENOUS BLD VENIPUNCTURE: CPT | Mod: PN | Performed by: UROLOGY

## 2025-03-03 ENCOUNTER — HOSPITAL ENCOUNTER (OUTPATIENT)
Dept: RADIOLOGY | Facility: HOSPITAL | Age: 70
Discharge: HOME | End: 2025-03-03
Attending: UROLOGY
Payer: MEDICARE

## 2025-03-03 DIAGNOSIS — N28.1 RENAL CYST: ICD-10-CM

## 2025-03-03 PROCEDURE — 74178 CT ABD&PLV WO CNTR FLWD CNTR: CPT | Mod: 26,,, | Performed by: RADIOLOGY

## 2025-03-03 PROCEDURE — 74178 CT ABD&PLV WO CNTR FLWD CNTR: CPT | Mod: TC,PN

## 2025-03-03 PROCEDURE — 25500020 PHARM REV CODE 255: Mod: PN | Performed by: UROLOGY

## 2025-03-03 RX ADMIN — IOHEXOL 75 ML: 350 INJECTION, SOLUTION INTRAVENOUS at 08:03

## 2025-03-18 ENCOUNTER — TELEPHONE (OUTPATIENT)
Dept: UROLOGY | Facility: CLINIC | Age: 70
End: 2025-03-18

## 2025-03-18 ENCOUNTER — OFFICE VISIT (OUTPATIENT)
Dept: UROLOGY | Facility: CLINIC | Age: 70
End: 2025-03-18
Payer: MEDICARE

## 2025-03-18 DIAGNOSIS — N28.1 RENAL CYST: Primary | ICD-10-CM

## 2025-03-18 NOTE — PROGRESS NOTES
The patient location is: [place]  The chief complaint leading to consultation is: renal cysts, DORY    Visit type: audiovisual    Face to Face time with patient: 2mins  10 minutes of total time spent on the encounter, which includes face to face time and non-face to face time preparing to see the patient (eg, review of tests), Obtaining and/or reviewing separately obtained history, Documenting clinical information in the electronic or other health record, Independently interpreting results (not separately reported) and communicating results to the patient/family/caregiver, or Care coordination (not separately reported).     Each patient to whom he or she provides medical services by telemedicine is:  (1) informed of the relationship between the physician and patient and the respective role of any other health care provider with respect to management of the patient; and (2) notified that he or she may decline to receive medical services by telemedicine and may withdraw from such care at any time.      Chief Complaint: Renal cysts    HPI:   03/18/2025 - patient returns today for follow-up, no new issues in the interim, CT confirms stable Bosniak 2F cyst, stress incontinence still doing very maybe two pads per day, denies gross hematuria, dysuria urgency or frequency    09/05/2024 - patient returns today for follow-up and review of her MRI, this shows confirmed Bosniak two F cyst that is hemorrhagic, she also notes no issues since her bulking mid procedure, has been very pleased, no longer having to wear any pads as she is dry the vast majority of the time, no gross hematuria or dysuria    07/09/2024 - patient returns today for cystoscopy and pelvic exam, notes issues with stress incontinence, still wearing 2-3 panty liners per day, more bothered than she used to be and would like to proceed with a procedure    06/27/2024 - patient returns today for follow-up and review of her CT, this shows right lower pole Bosniak two F  lesion with possible soft tissue component, no other suspicious or concerning findings, has thought about the bulkamid and wants to proceed with this    05/28/2024 - returns today for follow-up and review of her ultrasound, this shows increase in size of her complex renal cyst with some increased complexity including a possible solid component, there is no Doppler flow, still has some stress incontinence, wears three panty liners per day, not overly bothersome to her, no gross hematuria or dysuria    05/26/2023 - patient returns today for follow-up, no new issues in the interim, renal ultrasound shows stable cysts, having some stress incontinence and wears three thin panty liners per day, previously saw pelvic floor physical therapy and found it helpful, she would like to go back and see them, denies any gross hematuria or UTIs    05/25/2022 - patient presents today for follow-up, no issues in the interim, renal ultrasound several days ago showed stable renal cysts, no voiding issues, no gross hematuria    5/21/20: Reviewed history in detail. Feeling fine.  DORY about the same.   Indep assessment of imaging agree with report:  Renal cyst same right kidney.  No time for PFMT her  is having severe complications of multiple maladies.  5/20/19: Reviewed history in detail. Feeling fine, no problems.  Cyst same as before smaller if anything.  Some DORY not needing a pad.  5/17/18: No problems in the interval. Bilateral cysts same.  5/11/17: 62 yo woman followed for 4 years for renal cysts.  Had a pyelonephritis on the way.  Has a 2.5 cm right Adal II cyst that looks stable over many years.    Allergies:  Climara [estradiol]    Medications: has a current medication list which includes the following prescription(s): aspirin, butalbital-acetaminophen-caffeine -40 mg, cevimeline, cyclosporine, dorzolamide-timolol (pf), finasteride, latanoprost, metoprolol tartrate, sertraline, thyroid (pork), and  valsartan-hydrochlorothiazide.    Review of Systems:  General: No fever, chills  Skin: No rashes  Chest:  Denies cough and sputum production  Heart: Denies chest pain  Resp: Denies dyspnea  Abdomen: Denies diarrhea, abdominal pain, hematemesis, or blood in stool.  Musculoskeletal: No joint stiffness or swelling. Denies back pain.  : see HPI  Neuro: no dizziness or weakness      PMH:   has a past medical history of Chronic constipation, Depression, Dry eyes, Endometriosis of broad ligament, Hypothyroidism, Macular degeneration, Migraine headache, Osteoarthritis of hand, PAF (paroxysmal atrial fibrillation), Primary open angle glaucoma (POAG) of both eyes, mild stage (04/24/2018), and Seasonal allergic rhinitis.    PSH:   has a past surgical history that includes Lipoma resection (2010); Total abdominal hysterectomy w/ bilateral salpingoophorectomy (03/2008); Cervical conization w/ laser; Dilation and curettage of uterus; LASIK (2007); Argon Trabeculoplasty - OU - Both Eyes (Bilateral, 06/11/2020); Colonoscopy (N/A, 08/18/2022); Adenoidectomy (1962); Eye surgery (July 2020); Tonsillectomy (1962); Tubal ligation (1980); Hysterectomy (03/2008); and Cystoscopy with injection of periurethral bulking agent (N/A, 7/31/2024).    FamHx: family history includes Arthritis in her father; Dementia in her mother; Depression in her mother; Diabetes in her father; Glaucoma in her maternal uncle; Heart disease in her father and mother; Heart failure in her father; Hypertension in her father and mother; No Known Problems in her brother; Vision loss in her mother.    SocHx:  reports that she quit smoking about 42 years ago. Her smoking use included cigarettes. She started smoking about 57 years ago. She has a 15 pack-year smoking history. She has never used smokeless tobacco. She reports current alcohol use. She reports that she does not use drugs.     Physical Exam:  General: awake, alert, cooperative  Head: NC/AT  Ears: external  ears normal  Eyes: sclera normal  Lungs: normal inspiration, NAD  Heart: well-perfused  Skin: The skin is warm and dry.  Ext: No c/c/e.  Neuro: grossly intact, AOx3      Labs/Studies:   CT ABDOMEN PELVIS W WO CONTRAST     CLINICAL HISTORY:  2F cyst; Cyst of kidney, acquired     TECHNIQUE:  Low dose axial, sagittal and coronal reformations were obtained from the lung bases to the pubic symphysis before and following the IV administration of 100 mL of Omnipaque 350.  Timing was optimized for nephrogram and excretory renal phases.     COMPARISON:  05/30/2024, 08/07/2024     FINDINGS:  Heart: No cardiomegaly or pericardial effusion.     Lung Bases: Symmetrically expanded and clear.     Liver: Normal in size and attenuation without focal hepatic abnormality.     Gallbladder: Normal appearance without evidence for cholecystitis.     Bile Ducts: No intra or extrahepatic biliary ductal dilation.     Pancreas: No pancreatic mass lesion or peripancreatic inflammatory change.     Spleen: Normal.     Adrenals: Normal.     Genitourinary: Normal in size and location.  Stable appearing 17 x 11 mm cyst with septation lower pole right kidney.  No solid mass.  Subcentimeter cyst within the midpole left kidney.  No nephrolithiasis, or hydroureteronephrosis.  Mild bladder distension.     GI tract/Mesentery: Small hiatal hernia.  Stomach is normal appearance.  Visualized loops of small and large bowel are normal in caliber without evidence for inflammation or obstruction.  Normal appendix.     Peritoneal Space: No abdominopelvic ascites or intraperitoneal free air.     Retroperitoneum: No significant adenopathy.     Abdominal wall: Small fat containing left inguinal hernia.  Small fat containing umbilical hernia.     Vasculature: Abdominal aorta is normal in caliber without significant calcific atherosclerosis.     Bones: Normal appearance without acute fracture or bony destructive process.  Mild degenerative changes lower lumbar spine  and bilateral SI joints.     Impression:     Stable appearing 17 x 11 mm cyst with septation lower pole right kidney.  Findings again consistent with a Bosniak 2 Fahrenheit lesion as was seen on multiple prior exams.  Given stability from prior, recommend 1 year follow-up.    Lab Results   Component Value Date    WBC 5.98 07/09/2024    HGB 13.7 07/09/2024    HCT 41.8 07/09/2024     07/09/2024     07/09/2024    K 4.0 07/09/2024     07/09/2024    CREATININE 0.9 02/28/2025    BUN 10 07/09/2024    CO2 27 07/09/2024    TSH 0.464 12/12/2024    INR 1.0 10/13/2015    HGBA1C 5.4 12/29/2022    CHOL 212 (H) 12/12/2024    TRIG 145 12/12/2024    HDL 53 12/12/2024    ALT 22 07/09/2024    AST 21 07/09/2024     Procedure:  Diagnostic Cystoscopy    Indications:  LUTS    UA: normal, see lab results for values    Procedure in Detail: After proper consents were obtained, the patient was prepped and draped in normal sterile fashion for diagnostic cystoscopy. 5 ml of lidocaine jelly was instilled in the urethra. The flexible cystoscope was then introduced into the urethra, and advanced into the bladder under direct vision. The urethral mucosa appeared normal, and no strictures were noted. The sphincter was normal. The bladder neck was normal. Inspection of the interior of the bladder was then carried out. The trigone was unremarkable, with no mucosal lesions. The ureteral orifices were normal in position and configuration. Systematic inspection of the mucosa of the bladder it was then carried out, rotating the cystoscope so that all areas of the left and right lateral walls, the dome of the bladder, and the posterior wall were all visualized. The cystoscope was then advanced further into the bladder, and maximum deflection of the scope was performed so that the bladder neck could be inspected. No mucosal lesions were noted there. The cystoscope was then removed, and the procedure terminated. Patient tolerated the  procedure well. No complications.     Findings: normal bladder, no tumors    Impression/Plan:   DORY - s/p bulkamid, has been very pleased with her result, now just 1 ppd rarely, continue to monitor    Minimally complex renal cyst - David 2 F confrimed on MRI, f/u 1yr with renal ultrasound    Panfilo Light MD

## 2025-03-20 ENCOUNTER — PATIENT MESSAGE (OUTPATIENT)
Dept: ADMINISTRATIVE | Facility: HOSPITAL | Age: 70
End: 2025-03-20
Payer: MEDICARE

## 2025-03-24 ENCOUNTER — PATIENT MESSAGE (OUTPATIENT)
Dept: UROLOGY | Facility: CLINIC | Age: 70
End: 2025-03-24
Payer: MEDICARE

## 2025-04-08 ENCOUNTER — HOSPITAL ENCOUNTER (OUTPATIENT)
Dept: RADIOLOGY | Facility: HOSPITAL | Age: 70
Discharge: HOME | End: 2025-04-08
Attending: FAMILY MEDICINE
Payer: MEDICARE

## 2025-04-08 DIAGNOSIS — Z12.31 ENCOUNTER FOR SCREENING MAMMOGRAM FOR BREAST CANCER: ICD-10-CM

## 2025-04-08 PROCEDURE — 77063 BREAST TOMOSYNTHESIS BI: CPT | Mod: 26,,, | Performed by: RADIOLOGY

## 2025-04-08 PROCEDURE — 77067 SCR MAMMO BI INCL CAD: CPT | Mod: 26,,, | Performed by: RADIOLOGY

## 2025-04-08 PROCEDURE — 77067 SCR MAMMO BI INCL CAD: CPT | Mod: TC,PN

## 2025-04-10 NOTE — PROGRESS NOTES
Yolis Bourgeois  MRN:  [id number]  69 y.o. female      Patient Care Team:  Dana Johnson MD as PCP - General (Family Medicine)  Rufino Abbasi MD as Consulting Physician (Cardiology)  Panfilo Light MD as Consulting Physician (Urology)  Leonel Bosch MD as Consulting Physician (Ophthalmology)  James Tejada, NP as Nurse Practitioner (Family Medicine)      SUBJECTIVE:     CHIEF COMPLAINT:  - Medication refill    HPI:  Ms. Bourgeois is here for a refill on Fioricet. She reports recurrent headaches, history of migraines. No pain currently, feeling well. Most recent episode occurred 2 days ago, lasting several hours. The headaches are typically localized in the temple area, with shooting pains and significant sound sensitivity. Light sensitivity is also present but to a lesser degree than sound sensitivity. Even quiet sounds can exacerbate the pain during headache episodes.    She takes Fioricet for symptom management, which effectively alleviates the headaches. Takes sparingly, as she reports her last refill was last September. She emphasizes infrequent use of the medication, only when necessary for severe headaches. She reports that other treatments are ineffective compared to butalbital when headaches occur.    Regarding potential headache triggers, she confirms stable blood pressure and slightly improved stress levels. She has discontinued Zoloft, feeling it was not particularly effective, and prefers managing stress through morning walks.        Review of Systems   Constitutional: Negative.    Eyes:  Positive for photophobia (and phono). Negative for blurred vision, double vision, pain, discharge and redness.   Respiratory: Negative.     Cardiovascular: Negative.    Gastrointestinal:  Negative for nausea and vomiting.   Skin: Negative.    Neurological:  Positive for headaches. Negative for dizziness, tingling, tremors, seizures, loss of consciousness and weakness.       Review of patient's  "allergies indicates:   Allergen Reactions    Climara [estradiol]      Glue on climara patch         Problem List[1]        Current Outpatient Medications   Medication Instructions    aspirin (ECOTRIN) 81 mg, Daily    butalbital-acetaminophen-caffeine -40 mg (FIORICET, ESGIC) -40 mg per tablet TAKE 1 TABLET BY MOUTH EVERY 4-6 HOURS AS NEEDED FOR HEADACHES    cevimeline (EVOXAC) 30 mg, Oral, 3 times daily    cycloSPORINE (RESTASIS) 0.05 % ophthalmic emulsion 1 drop, Both Eyes, 2 times daily    dorzolamide-timolol, PF, (COSOPT, PF,) 2-0.5 % Dpet ophthalmic solution 1 drop, Both Eyes, 2 times daily    finasteride (PROPECIA) 1 mg, Every other day    latanoprost 0.005 % ophthalmic solution 1 drop, Both Eyes, Nightly    metoprolol tartrate (LOPRESSOR) 12.5 mg, 2 times daily    sertraline (ZOLOFT) 50 mg, Oral, Daily    thyroid, pork, (ARMOUR THYROID) 90 mg Tab 1 tablet, Before breakfast    valsartan-hydrochlorothiazide (DIOVAN-HCT) 160-12.5 mg per tablet 1 tablet, Oral, Daily         Past medical, surgical, family and social histories have been reviewed today.      OBJECTIVE:     Vitals:    04/11/25 0914   BP: 134/72   Pulse: 65   Resp: 18   Temp: 97.1 °F (36.2 °C)   TempSrc: Tympanic   SpO2: 97%   Weight: 82.3 kg (181 lb 8.8 oz)   Height: 5' 2" (1.575 m)     Vitals:    04/11/25 0914   PainSc: 0-No pain       Physical Exam  Vitals reviewed.   Constitutional:       General: She is not in acute distress.  Cardiovascular:      Rate and Rhythm: Normal rate and regular rhythm.   Pulmonary:      Effort: Pulmonary effort is normal.      Breath sounds: Normal breath sounds.   Skin:     Capillary Refill: Capillary refill takes less than 2 seconds.   Neurological:      Mental Status: She is alert and oriented to person, place, and time.      Sensory: No sensory deficit.      Motor: No weakness.      Coordination: Coordination normal.      Gait: Gait normal.   Psychiatric:         Mood and Affect: Mood normal.         " Behavior: Behavior normal.         Thought Content: Thought content normal.         Judgment: Judgment normal.         ASSESSMENT:     1. Migraine without status migrainosus, not intractable, unspecified migraine type ---- chronic issue, Fioricet as directed.  Last refilled 9/2024, does not take excessively.  -     butalbital-acetaminophen-caffeine -40 mg (FIORICET, ESGIC) -40 mg per tablet; Take 1 tablet by mouth every 4 to 6 hours as needed for Headaches.  Dispense: 30 tablet; Refill: 3    2. Essential hypertension ----- chronic issue, on med as ordered.  Stable, well-controlled, doing well.  BP today 134/72.  Per Card.    3. Adjustment disorder with anxious mood ---- chronic issue with slight improvement.  She was on sertraline for a period of time but did not feel it helped.  She has discontinued medication and using healthy coping mechanisms and natural remedies to relieve stress.  Feels doing better.  To monitor.    4. Seasonal allergic rhinitis, unspecified trigger ----- chronic issue, takes allergy med prn.  No current issues reported.      PLAN:     Med refilled.  Annual last completed with PCP 11/12/2024.  RTC as directed and/or prn.        FAHAD Blackburn  [facility]       Future Appointments   Date Time Provider Department Center   6/12/2025  9:30 AM FIELDS, VISUAL-ONE [facility] OPHTHAL [facility]   6/12/2025 10:00 AM Leonel Bosch MD [facility] OPHTHAL [facility]   3/16/2026  8:40 AM [facility] US1 [facility]   3/18/2026 11:15 AM Panfilo Light MD [facility] UROLOGY [facility]            [1]   Patient Active Problem List  Diagnosis    Acquired hypothyroidism    Migraine headache    Osteoarthritis of hand    Seasonal allergic rhinitis    Dryness, eye    Adjustment disorder with anxious mood    Chronic constipation    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

## 2025-04-11 ENCOUNTER — OFFICE VISIT (OUTPATIENT)
Dept: FAMILY MEDICINE | Facility: CLINIC | Age: 70
End: 2025-04-11
Payer: MEDICARE

## 2025-04-11 VITALS
BODY MASS INDEX: 33.41 KG/M2 | SYSTOLIC BLOOD PRESSURE: 134 MMHG | WEIGHT: 181.56 LBS | OXYGEN SATURATION: 97 % | HEART RATE: 65 BPM | DIASTOLIC BLOOD PRESSURE: 72 MMHG | TEMPERATURE: 97 F | HEIGHT: 62 IN | RESPIRATION RATE: 18 BRPM

## 2025-04-11 DIAGNOSIS — I10 ESSENTIAL HYPERTENSION: Chronic | ICD-10-CM

## 2025-04-11 DIAGNOSIS — J30.2 SEASONAL ALLERGIC RHINITIS, UNSPECIFIED TRIGGER: ICD-10-CM

## 2025-04-11 DIAGNOSIS — G43.909 MIGRAINE WITHOUT STATUS MIGRAINOSUS, NOT INTRACTABLE, UNSPECIFIED MIGRAINE TYPE: Primary | ICD-10-CM

## 2025-04-11 DIAGNOSIS — F43.22 ADJUSTMENT DISORDER WITH ANXIOUS MOOD: ICD-10-CM

## 2025-04-11 PROCEDURE — 99999 PR PBB SHADOW E&M-EST. PATIENT-LVL IV: CPT | Mod: PBBFAC,,, | Performed by: REGISTERED NURSE

## 2025-04-11 RX ORDER — BUTALBITAL, ACETAMINOPHEN AND CAFFEINE 50; 325; 40 MG/1; MG/1; MG/1
1 TABLET ORAL
Qty: 30 TABLET | Refills: 3 | Status: SHIPPED | OUTPATIENT
Start: 2025-04-11

## 2025-04-11 RX ORDER — BUTALBITAL, ACETAMINOPHEN AND CAFFEINE 50; 325; 40 MG/1; MG/1; MG/1
1 TABLET ORAL
Qty: 30 TABLET | Refills: 3 | Status: SHIPPED | OUTPATIENT
Start: 2025-04-11 | End: 2025-04-11 | Stop reason: SDUPTHER

## 2025-04-14 ENCOUNTER — RESULTS FOLLOW-UP (OUTPATIENT)
Dept: FAMILY MEDICINE | Facility: CLINIC | Age: 70
End: 2025-04-14

## 2025-04-15 DIAGNOSIS — H16.223 KERATITIS SICCA, BILATERAL: ICD-10-CM

## 2025-04-15 RX ORDER — CEVIMELINE HYDROCHLORIDE 30 MG/1
30 CAPSULE ORAL 3 TIMES DAILY
Qty: 90 CAPSULE | Refills: 11 | Status: SHIPPED | OUTPATIENT
Start: 2025-04-15 | End: 2026-04-15

## 2025-06-12 ENCOUNTER — OFFICE VISIT (OUTPATIENT)
Dept: OPHTHALMOLOGY | Facility: CLINIC | Age: 70
End: 2025-06-12
Payer: MEDICARE

## 2025-06-12 DIAGNOSIS — H40.1131 PRIMARY OPEN ANGLE GLAUCOMA (POAG) OF BOTH EYES, MILD STAGE: Primary | ICD-10-CM

## 2025-06-12 DIAGNOSIS — Z98.890 HISTORY OF REFRACTIVE SURGERY: ICD-10-CM

## 2025-06-12 DIAGNOSIS — H18.832 RECURRENT CORNEAL EROSION, LEFT: ICD-10-CM

## 2025-06-12 DIAGNOSIS — H16.223 KERATITIS SICCA, BILATERAL: ICD-10-CM

## 2025-06-12 PROCEDURE — 92083 EXTENDED VISUAL FIELD XM: CPT | Mod: S$GLB,,, | Performed by: OPHTHALMOLOGY

## 2025-06-12 PROCEDURE — G2211 COMPLEX E/M VISIT ADD ON: HCPCS | Mod: S$GLB,,, | Performed by: OPHTHALMOLOGY

## 2025-06-12 PROCEDURE — 1159F MED LIST DOCD IN RCRD: CPT | Mod: CPTII,S$GLB,, | Performed by: OPHTHALMOLOGY

## 2025-06-12 PROCEDURE — 99214 OFFICE O/P EST MOD 30 MIN: CPT | Mod: S$GLB,,, | Performed by: OPHTHALMOLOGY

## 2025-06-12 PROCEDURE — 99999 PR PBB SHADOW E&M-EST. PATIENT-LVL III: CPT | Mod: PBBFAC,,, | Performed by: OPHTHALMOLOGY

## 2025-06-12 PROCEDURE — 1160F RVW MEDS BY RX/DR IN RCRD: CPT | Mod: CPTII,S$GLB,, | Performed by: OPHTHALMOLOGY

## 2025-06-12 PROCEDURE — 92133 CPTRZD OPH DX IMG PST SGM ON: CPT | Mod: S$GLB,,, | Performed by: OPHTHALMOLOGY

## 2025-07-22 RX ORDER — CYCLOSPORINE 0.5 MG/ML
1 EMULSION OPHTHALMIC 2 TIMES DAILY
Qty: 60 EACH | Refills: 12 | Status: SHIPPED | OUTPATIENT
Start: 2025-07-22 | End: 2026-07-22

## 2025-08-11 DIAGNOSIS — H16.223 KERATITIS SICCA, BILATERAL: ICD-10-CM

## 2025-08-11 DIAGNOSIS — H40.1131 PRIMARY OPEN ANGLE GLAUCOMA (POAG) OF BOTH EYES, MILD STAGE: ICD-10-CM

## 2025-08-11 RX ORDER — DORZOLAMIDE HYDROCHLORIDE AND TIMOLOL MALEATE PRESERVATIVE FREE 20; 5 MG/ML; MG/ML
1 SOLUTION/ DROPS OPHTHALMIC 2 TIMES DAILY
Qty: 60 EACH | Refills: 3 | Status: SHIPPED | OUTPATIENT
Start: 2025-08-11

## 2025-08-13 ENCOUNTER — OFFICE VISIT (OUTPATIENT)
Dept: OPHTHALMOLOGY | Facility: CLINIC | Age: 70
End: 2025-08-13
Payer: MEDICARE

## 2025-08-13 DIAGNOSIS — H16.223 KERATITIS SICCA, BILATERAL: Primary | ICD-10-CM

## 2025-08-13 DIAGNOSIS — H40.1131 PRIMARY OPEN ANGLE GLAUCOMA (POAG) OF BOTH EYES, MILD STAGE: ICD-10-CM

## 2025-08-13 PROCEDURE — 99214 OFFICE O/P EST MOD 30 MIN: CPT | Mod: S$GLB,,, | Performed by: OPTOMETRIST

## 2025-08-13 PROCEDURE — 99999 PR PBB SHADOW E&M-EST. PATIENT-LVL III: CPT | Mod: PBBFAC,,, | Performed by: OPTOMETRIST

## 2025-08-13 PROCEDURE — 1160F RVW MEDS BY RX/DR IN RCRD: CPT | Mod: CPTII,S$GLB,, | Performed by: OPTOMETRIST

## 2025-08-13 PROCEDURE — 1159F MED LIST DOCD IN RCRD: CPT | Mod: CPTII,S$GLB,, | Performed by: OPTOMETRIST

## (undated) DEVICE — MARKER SKIN RULER STERILE

## (undated) DEVICE — SYR ONLY LUER LOCK 20CC

## (undated) DEVICE — CONTAINER SPECIMEN OR STER 4OZ

## (undated) DEVICE — GLOVE SIGNATURE ESSNTL LTX 7.5

## (undated) DEVICE — BOWL STERILE LARGE 32OZ

## (undated) DEVICE — IRRIGATION SET Y-TYPE TUR/BLAD

## (undated) DEVICE — DRAPE T CYSTOSCOPY STERILE

## (undated) DEVICE — SOL NACL IRR 3000ML

## (undated) DEVICE — SOL IRRI STRL WATER 1000ML

## (undated) DEVICE — UROVIEW 2600/2800

## (undated) DEVICE — CANISTER SUCTION JUMBO 12L

## (undated) DEVICE — TOWEL OR DISP STRL BLUE 4/PK

## (undated) DEVICE — TRAY SKIN SCRUB WET 4 COMPART

## (undated) DEVICE — ADAPTER TUOHY BORST 6FR

## (undated) DEVICE — SPONGE COTTON TRAY 4X4IN

## (undated) DEVICE — GOWN POLY REINF X-LONG XL

## (undated) DEVICE — GOWN POLY REINF BRTH SLV XL